# Patient Record
Sex: MALE | Race: WHITE | NOT HISPANIC OR LATINO | Employment: OTHER | URBAN - METROPOLITAN AREA
[De-identification: names, ages, dates, MRNs, and addresses within clinical notes are randomized per-mention and may not be internally consistent; named-entity substitution may affect disease eponyms.]

---

## 2017-01-17 ENCOUNTER — OFFICE VISIT (OUTPATIENT)
Dept: FAMILY MEDICINE | Facility: CLINIC | Age: 82
End: 2017-01-17
Payer: MEDICARE

## 2017-01-17 VITALS
HEART RATE: 59 BPM | SYSTOLIC BLOOD PRESSURE: 122 MMHG | BODY MASS INDEX: 27.38 KG/M2 | WEIGHT: 148.81 LBS | HEIGHT: 62 IN | DIASTOLIC BLOOD PRESSURE: 54 MMHG

## 2017-01-17 DIAGNOSIS — F03.90 DEMENTIA WITHOUT BEHAVIORAL DISTURBANCE, UNSPECIFIED DEMENTIA TYPE: Chronic | ICD-10-CM

## 2017-01-17 DIAGNOSIS — E78.5 HYPERLIPIDEMIA, UNSPECIFIED HYPERLIPIDEMIA TYPE: Chronic | ICD-10-CM

## 2017-01-17 DIAGNOSIS — R32 URINARY INCONTINENCE, UNSPECIFIED TYPE: ICD-10-CM

## 2017-01-17 DIAGNOSIS — H52.203 MYOPIA WITH ASTIGMATISM AND PRESBYOPIA, BILATERAL: ICD-10-CM

## 2017-01-17 DIAGNOSIS — I10 ESSENTIAL HYPERTENSION: Chronic | ICD-10-CM

## 2017-01-17 DIAGNOSIS — H52.13 MYOPIA WITH ASTIGMATISM AND PRESBYOPIA, BILATERAL: ICD-10-CM

## 2017-01-17 DIAGNOSIS — H52.4 MYOPIA WITH ASTIGMATISM AND PRESBYOPIA, BILATERAL: ICD-10-CM

## 2017-01-17 DIAGNOSIS — Z00.00 ENCOUNTER FOR PREVENTIVE HEALTH EXAMINATION: Primary | ICD-10-CM

## 2017-01-17 DIAGNOSIS — I10 DIABETES MELLITUS WITH COINCIDENT HYPERTENSION: Chronic | ICD-10-CM

## 2017-01-17 DIAGNOSIS — Z86.010 HISTORY OF COLON POLYPS: ICD-10-CM

## 2017-01-17 DIAGNOSIS — E11.9 DIABETES MELLITUS WITH COINCIDENT HYPERTENSION: Chronic | ICD-10-CM

## 2017-01-17 DIAGNOSIS — Z96.1 PSEUDOPHAKIA: ICD-10-CM

## 2017-01-17 DIAGNOSIS — Z85.46 HISTORY OF PROSTATE CANCER: Chronic | ICD-10-CM

## 2017-01-17 PROCEDURE — 3074F SYST BP LT 130 MM HG: CPT | Mod: S$GLB,,, | Performed by: NURSE PRACTITIONER

## 2017-01-17 PROCEDURE — G0439 PPPS, SUBSEQ VISIT: HCPCS | Mod: S$GLB,,, | Performed by: NURSE PRACTITIONER

## 2017-01-17 PROCEDURE — 99999 PR PBB SHADOW E&M-EST. PATIENT-LVL IV: CPT | Mod: PBBFAC,,, | Performed by: NURSE PRACTITIONER

## 2017-01-17 PROCEDURE — 99499 UNLISTED E&M SERVICE: CPT | Mod: S$GLB,,, | Performed by: NURSE PRACTITIONER

## 2017-01-17 PROCEDURE — 3078F DIAST BP <80 MM HG: CPT | Mod: S$GLB,,, | Performed by: NURSE PRACTITIONER

## 2017-01-17 NOTE — PROGRESS NOTES
"Darnell Knott presented for a  Medicare AWV and comprehensive Health Risk Assessment today. The following components were reviewed and updated:    · Medical history  · Family History  · Social history  · Allergies and Current Medications  · Health Risk Assessment  · Health Maintenance  · Care Team     ** See Completed Assessments for Annual Wellness Visit within the encounter summary.**  Patient offers no complaints. He is accompanied by his wife. He is regularly seen by his primary care physician, Dr. Caldera. He also has regular optometry exams with Dr. Roper, and, sees his dentist regularly.  He lives at home with his wife who is his primary caregiver. He has dementia by is able to perform all ADL's. He is active around the house and claims to eat a healthy diet.  He declines a tetanus vaccine today.     The following assessments were completed:  · Living Situation  · CAGE  · Depression Screening  · Timed Get Up and Go  · Whisper Test  · Cognitive Function Screening  · Nutrition Screening  · ADL Screening  · PAQ Screening    Vitals:    01/17/17 1005 01/17/17 1020   BP: (!) 149/71 (!) 122/54   BP Location: Left arm Left arm   Patient Position: Sitting Sitting   BP Method: Automatic Manual   Pulse: (!) 59    Weight: 67.5 kg (148 lb 13 oz)    Height: 5' 2" (1.575 m)      Body mass index is 27.22 kg/(m^2).  Physical Exam   Constitutional: He is oriented to person, place, and time. He appears well-developed and well-nourished. No distress.   HENT:   Head: Normocephalic and atraumatic.   Right Ear: External ear normal.   Left Ear: External ear normal.   Nose: Nose normal.   Mouth/Throat: Oropharynx is clear and moist. No oropharyngeal exudate.   Eyes: Conjunctivae and EOM are normal. Pupils are equal, round, and reactive to light. No scleral icterus.   Neck: Normal range of motion. Neck supple. No thyromegaly present.   Cardiovascular: Normal rate, regular rhythm, normal heart sounds and intact distal pulses.  "   Pulmonary/Chest: Effort normal and breath sounds normal. No respiratory distress. He has no wheezes. He exhibits no tenderness.   Abdominal: Soft. Bowel sounds are normal. He exhibits no distension and no mass. There is no tenderness. There is no guarding.   Musculoskeletal: Normal range of motion. He exhibits no edema or deformity.   Lymphadenopathy:     He has no cervical adenopathy.   Neurological: He is alert and oriented to person, place, and time.   Skin: Skin is warm and dry. He is not diaphoretic. No erythema.   Psychiatric: He has a normal mood and affect. His behavior is normal. Judgment and thought content normal.   Nursing note and vitals reviewed.        Diagnoses and health risks identified today and associated recommendations/orders:    1. Encounter for preventive health examination    2. Urinary complication  Stable and controlled. Continue current treatment plan as previously prescribed with your PCP.     3. Urinary incontinence, unspecified type  Stable and controlled. Continue current treatment plan as previously prescribed with your PCP.     4. History of prostate cancer  Stable and controlled. Continue current treatment plan as previously prescribed with your PCP.     5. History of colon polyps  Stable and controlled. Continue current treatment plan as previously prescribed with your PCP.     6. Diabetes mellitus with coincident hypertension  This problem is currently not controlled. Please follow up with your PCP as planned to discuss adjustments to your treatment plan.    7. Essential hypertension  Stable and controlled. Continue current treatment plan as previously prescribed with your PCP.     8. Hyperlipidemia, unspecified hyperlipidemia type  Stable and controlled. Continue current treatment plan as previously prescribed with your PCP.     9. Dementia without behavioral disturbance, unspecified dementia type  Stable and controlled. Continue current treatment plan as previously prescribed  with your PCP.     10. Myopia with astigmatism and presbyopia, bilateral  Stable and controlled. Continue current treatment plan as previously prescribed with Dr. Roper.     11. Pseudophakia  Stable and controlled. Continue current treatment plan as previously prescribed with Dr. Roper.       Provided Relious with a 5-10 year written screening schedule and personal prevention plan. Recommendations were developed using the USPSTF age appropriate recommendations. Education, counseling, and referrals were provided as needed. After Visit Summary printed and given to patient which includes a list of additional screenings\tests needed.    Return for Regular visit with Dr. Caldera.    LOYD Nicholson

## 2017-01-17 NOTE — PATIENT INSTRUCTIONS
Counseling and Referral of Other Preventative  (Italic type indicates deductible and co-insurance are waived)    Patient Name: Darnell Knott  Today's Date: 1/17/2017      SERVICE LIMITATIONS RECOMMENDATION    Vaccines    · Pneumococcal (once after 65)    · Influenza (annually)    · Hepatitis B (if medium/high risk)    · Prevnar 13      Hepatitis B medium/high risk factors:       - End-stage renal disease       - Hemophiliacs who received Factor VII or         IX concentrates       - Clients of institutions for the mentally             retarded       - Persons who live in the same house as          a HepB carrier       - Homosexual men       - Illicit injectable drug abusers     Pneumococcal: Done     Influenza:Done   Hepatitis B:N/A     Prevnar 13:Done    Prostate cancer screening (annually to age 75)     Prostate specific antigen (PSA) Shared decision making with Provider. Sometimes a co-pay may be required if the patient decides to have this test. The USPSTF no longer recommends prostate cancer screening routinely in medicine    Colorectal cancer screening (to age 75)    · Fecal occult blood test (annual)  · Flexible sigmoidoscopy (5y)  · Screening colonoscopy (10y)  · Barium enema  N/A    Diabetes self-management training (no USPSTF recommendations)  Requires referral by treating physician for patient with diabetes or renal disease. 10 hours of initial DSMT sessions of no less than 30 minutes each in a continuous 12-month period. 2 hours of follow-up DSMT in subsequent years. Done    Glaucoma screening (no USPSTF recommendation)  Diabetes mellitus, family history   , age 50 or over    American, age 65 or over Done    Medical nutrition therapy for diabetes or renal disease (no recommended schedule)  Requires referral by treating physician for patient with diabetes or renal disease or kidney transplant within the past 3 years.  Can be provided in same year as diabetes self-management  training (DSMT), and CMS recommends medical nutrition therapy take place after DSMT. Up to 3 hours for initial year and 2 hours in subsequent years. Done    Cardiovascular screening blood tests (every 5 years)  · Fasting lipid panel  Order as a panel if possible Done    Diabetes screening tests (at least every 3 years, Medicare covers annually or at 6-month intervals for prediabetic patients)  · Fasting blood sugar (FBS) or glucose tolerance test (GTT)  Patient must be diagnosed with one of the following:       - Hypertension       - Dyslipidemia       - Obesity (BMI 30kg/m2)       - Previous elevated impaired FBS or GTT       ... or any two of the following:       - Overweight (BMI 25 but <30)       - Family history of diabetes       - Age 65 or older       - History of gestational diabetes or birth of baby weighing more than 9 pounds Done    Abdominal aortic aneurysm screening (once)  · Sonogram   Limited to patients who meet one of the following criteria:       - Men who are 65-75 years old and have smoked more than 100 cigarette in their lifetime       - Anyone with a family history of abdominal aortic aneurysm       - Anyone recommended for screening by the USPSTF N/A    HIV screening (annually for increased risk patients)  · HIV-1 and HIV-2 by EIA, or PRADIP, rapid antibody test or oral mucosa transudate  Patients must be at increased risk for HIV infection per USPSTF guidelines or pregnant. Tests covered annually for patient at increased risk or as requested by the patient. Pregnant patients may receive up to 3 tests during pregnancy.  Risks discussed, screening is not recommended    Smoking cessation counseling (up to 8 sessions per year)  Patients must be asymptomatic of tobacco-related conditions to receive as a preventative service. N/A    Subsequent annual wellness visit  At least 12 months since last AWV  Return in one year     The following information is provided to all patients.  This information is  to help you find resources for any of the problems found today that may be affecting your health:                Living healthy guide: www.[facility].[place].[facility]      Understanding Diabetes: www.diabetes.org      Eating healthy: www.cdc.gov/healthyweight      CDC home safety checklist: www.cdc.gov/steadi/patient.html      Agency on Aging: www.goea.[place].[facility]      Alcoholics anonymous (AA): www.aa.org      Physical Activity: www.aj.nih.gov/no9axmb      Tobacco use: www.quitwithusla.org

## 2017-01-26 DIAGNOSIS — E11.9 TYPE 2 DIABETES MELLITUS WITHOUT COMPLICATION, WITHOUT LONG-TERM CURRENT USE OF INSULIN: ICD-10-CM

## 2017-01-26 RX ORDER — METFORMIN HYDROCHLORIDE 500 MG/1
500 TABLET ORAL 2 TIMES DAILY WITH MEALS
Qty: 90 TABLET | Refills: 1 | Status: SHIPPED | OUTPATIENT
Start: 2017-01-26 | End: 2017-03-27 | Stop reason: SDUPTHER

## 2017-01-26 NOTE — TELEPHONE ENCOUNTER
----- Message from Brandie Vasquez sent at 1/26/2017 10:09 AM CST -----  Contact: wife, nicole   metformin (GLUCOPHAGE) 500 MG tablet  .  [facility] - [place], [place] - [address]  [address]  [facility] [address]  Phone: [phone] Fax: [phone]      Call back  [phone]

## 2017-01-30 NOTE — TELEPHONE ENCOUNTER
i spoke with his wife   He is taking metformin 500mg BID and when i mentioned increasing his medication. She is afraid if we increase it she will have more GI problems. I mentioned 1000 mg BID. She requested that i mention this to you. You might want to change the medication before you increase it. hgb a1c was 9.1 last time,

## 2017-01-31 NOTE — TELEPHONE ENCOUNTER
I spoke with Mrs. Knott.  She is reluctant to change medications.  While the metformin may be contributing to diarrhea, she would like to try a higher dose before he move onto other meds.  Therefore she'll use 1000 in the morning and 500 in the evening of metformin.  She will also add a probiotic.    Please check with her in 2-3 weeks to see what's happening to his morning sugars and to see if there's been any change in his diarrheal pattern.    MALATHI

## 2017-02-14 ENCOUNTER — TELEPHONE (OUTPATIENT)
Dept: FAMILY MEDICINE | Facility: CLINIC | Age: 82
End: 2017-02-14

## 2017-02-14 RX ORDER — FLUOCINONIDE 0.5 MG/G
OINTMENT TOPICAL 2 TIMES DAILY
Qty: 60 G | Refills: 1 | Status: SHIPPED | OUTPATIENT
Start: 2017-02-14 | End: 2019-05-21

## 2017-02-14 NOTE — TELEPHONE ENCOUNTER
This am blood sugar is 115, yesterday 105. Holding pretty good.   He is taking the probiotic. He had a flare up of diarrhea yesterday but not as bad since he is taking probiotic.     Pt also needs refill of lidex cream. For his foot. For the itching rash. [facility] to [facility].

## 2017-02-14 NOTE — TELEPHONE ENCOUNTER
----- Message from RT Dora sent at 2/14/2017 11:22 AM CST -----  Contact: Love (Wife) [phone]   Called Love chirinos (Wife) [phone], requesting a back since the line was some how disconnected earlier, thanks.

## 2017-03-27 ENCOUNTER — OFFICE VISIT (OUTPATIENT)
Dept: FAMILY MEDICINE | Facility: CLINIC | Age: 82
End: 2017-03-27
Payer: MEDICARE

## 2017-03-27 ENCOUNTER — LAB VISIT (OUTPATIENT)
Dept: LAB | Facility: HOSPITAL | Age: 82
End: 2017-03-27
Attending: EMERGENCY MEDICINE
Payer: MEDICARE

## 2017-03-27 VITALS
WEIGHT: 143.31 LBS | BODY MASS INDEX: 26.37 KG/M2 | OXYGEN SATURATION: 95 % | RESPIRATION RATE: 17 BRPM | HEART RATE: 56 BPM | SYSTOLIC BLOOD PRESSURE: 130 MMHG | DIASTOLIC BLOOD PRESSURE: 60 MMHG | HEIGHT: 62 IN

## 2017-03-27 DIAGNOSIS — E11.9 DIABETES MELLITUS WITH COINCIDENT HYPERTENSION: Chronic | ICD-10-CM

## 2017-03-27 DIAGNOSIS — I10 ESSENTIAL HYPERTENSION: Chronic | ICD-10-CM

## 2017-03-27 DIAGNOSIS — F03.90 DEMENTIA WITHOUT BEHAVIORAL DISTURBANCE, UNSPECIFIED DEMENTIA TYPE: Primary | Chronic | ICD-10-CM

## 2017-03-27 DIAGNOSIS — E11.9 TYPE 2 DIABETES MELLITUS WITHOUT COMPLICATION, WITHOUT LONG-TERM CURRENT USE OF INSULIN: ICD-10-CM

## 2017-03-27 DIAGNOSIS — R63.4 WEIGHT LOSS: ICD-10-CM

## 2017-03-27 DIAGNOSIS — Z85.46 HISTORY OF PROSTATE CANCER: Chronic | ICD-10-CM

## 2017-03-27 DIAGNOSIS — I10 DIABETES MELLITUS WITH COINCIDENT HYPERTENSION: Chronic | ICD-10-CM

## 2017-03-27 LAB
BASOPHILS # BLD AUTO: 0.03 K/UL
BASOPHILS NFR BLD: 0.2 %
DIFFERENTIAL METHOD: ABNORMAL
EOSINOPHIL # BLD AUTO: 0.1 K/UL
EOSINOPHIL NFR BLD: 0.7 %
ERYTHROCYTE [DISTWIDTH] IN BLOOD BY AUTOMATED COUNT: 14.1 %
HCT VFR BLD AUTO: 41 %
HGB BLD-MCNC: 13.4 G/DL
LYMPHOCYTES # BLD AUTO: 2 K/UL
LYMPHOCYTES NFR BLD: 16.2 %
MCH RBC QN AUTO: 26.1 PG
MCHC RBC AUTO-ENTMCNC: 32.7 %
MCV RBC AUTO: 80 FL
MONOCYTES # BLD AUTO: 0.8 K/UL
MONOCYTES NFR BLD: 6.5 %
NEUTROPHILS # BLD AUTO: 9.2 K/UL
NEUTROPHILS NFR BLD: 76.2 %
PLATELET # BLD AUTO: 357 K/UL
PMV BLD AUTO: 10.3 FL
RBC # BLD AUTO: 5.14 M/UL
WBC # BLD AUTO: 12.06 K/UL

## 2017-03-27 PROCEDURE — 3078F DIAST BP <80 MM HG: CPT | Mod: S$GLB,,, | Performed by: EMERGENCY MEDICINE

## 2017-03-27 PROCEDURE — 1126F AMNT PAIN NOTED NONE PRSNT: CPT | Mod: S$GLB,,, | Performed by: EMERGENCY MEDICINE

## 2017-03-27 PROCEDURE — 83036 HEMOGLOBIN GLYCOSYLATED A1C: CPT

## 2017-03-27 PROCEDURE — 1159F MED LIST DOCD IN RCRD: CPT | Mod: S$GLB,,, | Performed by: EMERGENCY MEDICINE

## 2017-03-27 PROCEDURE — 99214 OFFICE O/P EST MOD 30 MIN: CPT | Mod: S$GLB,,, | Performed by: EMERGENCY MEDICINE

## 2017-03-27 PROCEDURE — 99999 PR PBB SHADOW E&M-EST. PATIENT-LVL IV: CPT | Mod: PBBFAC,,, | Performed by: EMERGENCY MEDICINE

## 2017-03-27 PROCEDURE — 3075F SYST BP GE 130 - 139MM HG: CPT | Mod: S$GLB,,, | Performed by: EMERGENCY MEDICINE

## 2017-03-27 PROCEDURE — 1160F RVW MEDS BY RX/DR IN RCRD: CPT | Mod: S$GLB,,, | Performed by: EMERGENCY MEDICINE

## 2017-03-27 PROCEDURE — 1157F ADVNC CARE PLAN IN RCRD: CPT | Mod: S$GLB,,, | Performed by: EMERGENCY MEDICINE

## 2017-03-27 PROCEDURE — 85025 COMPLETE CBC W/AUTO DIFF WBC: CPT

## 2017-03-27 PROCEDURE — 36415 COLL VENOUS BLD VENIPUNCTURE: CPT | Mod: PO

## 2017-03-27 RX ORDER — METFORMIN HYDROCHLORIDE 500 MG/1
500 TABLET ORAL 2 TIMES DAILY WITH MEALS
Qty: 180 TABLET | Refills: 3 | Status: SHIPPED | OUTPATIENT
Start: 2017-03-27 | End: 2017-04-19 | Stop reason: SINTOL

## 2017-03-27 RX ORDER — PENICILLIN V POTASSIUM 500 MG/1
500 TABLET, FILM COATED ORAL 3 TIMES DAILY
COMMUNITY
Start: 2017-03-23 | End: 2018-04-05

## 2017-03-27 RX ORDER — METFORMIN HYDROCHLORIDE 500 MG/1
500 TABLET ORAL 2 TIMES DAILY WITH MEALS
Qty: 180 TABLET | Refills: 1 | Status: SHIPPED | OUTPATIENT
Start: 2017-03-27 | End: 2017-03-27 | Stop reason: SDUPTHER

## 2017-03-27 NOTE — MR AVS SNAPSHOT
[facility]  [address] [facility] [address]  [place] [address]  Phone: [phone]  Fax: [phone]                  Darnell Knott   3/27/2017 10:40 AM   Office Visit    Description:  Male : 1932   Provider:  Nixon Caldera Jr., MD   Department:  [facility]           Reason for Visit     type 2 diabetes mellitus without complications           Diagnoses this Visit        Comments    Dementia without behavioral disturbance, unspecified dementia type    -  Primary     Diabetes mellitus with coincident hypertension         Essential hypertension         History of prostate cancer         Weight loss         Type 2 diabetes mellitus without complication, without long-term current use of insulin                To Do List           Future Appointments        Provider Department Dept Phone    3/27/2017 12:30 PM LAB, [place] [facility] [phone]    2017 8:05 AM LAB, [place] [facility] [phone]      Goals (5 Years of Data)              16    HEMOGLOBIN A1C < 7.0   9.1  8.1      LDL CHOLESTEROL < 100       92.2       These Medications        Disp Refills Start End    metformin (GLUCOPHAGE) 500 MG tablet 180 tablet 3 3/27/2017 3/27/2018    Take 1 tablet (500 mg total) by mouth 2 (two) times daily with meals. - Oral    Pharmacy: [facility] - [place] [id number] [facility] Ph #: [phone]         [facility] On Call     [facility] Nurse Care Line -  Assistance  Registered nurses in the [facility] provide clinical advisement, health education, appointment booking, and other advisory services.  Call for this free service at [phone].             Medications           Message regarding Medications     Verify the changes and/or additions to your medication regime listed below are the same as discussed with your clinician today.  If any of these  "changes or additions are incorrect, please notify your healthcare provider.             Verify that the below list of medications is an accurate representation of the medications you are currently taking.  If none reported, the list may be blank. If incorrect, please contact your healthcare provider. Carry this list with you in case of emergency.           Current Medications     acetaminophen (TYLENOL) 325 MG tablet Take 325 mg by mouth every 6 (six) hours as needed for Pain.    amlodipine (NORVASC) 10 MG tablet Take 1 tablet (10 mg total) by mouth once daily.    aspirin 81 mg Tab 1 Tablet(s) Oral PRN Every day.      atorvastatin (LIPITOR) 40 MG tablet Take 1 tablet (40 mg total) by mouth once daily.    donepezil (ARICEPT) 5 MG tablet Take 1 tablet (5 mg total) by mouth every evening.    fluocinonide (LIDEX) 0.05 % ointment Apply topically 2 (two) times daily.    fluticasone (FLONASE) 50 mcg/actuation nasal spray 2 sprays by Each Nare route once daily.    lisinopril (PRINIVIL,ZESTRIL) 5 MG tablet Take 1 tablet (5 mg total) by mouth once daily.    loperamide (IMODIUM) 2 mg capsule Take 2 mg by mouth 4 (four) times daily as needed for Diarrhea.    metformin (GLUCOPHAGE) 500 MG tablet Take 1 tablet (500 mg total) by mouth 2 (two) times daily with meals.    MV, MIN #36/IRON,CARBONYL/FA (GERITOL COMPLETE ORAL) Take by mouth.    omeprazole (PRILOSEC) 20 MG capsule Take 1 capsule (20 mg total) by mouth once daily.    penicillin v potassium (VEETID) 500 MG tablet Take 500 tablets by mouth 3 (three) times daily.           Clinical Reference Information           Your Vitals Were     BP Pulse Resp Height Weight SpO2    130/60 (BP Location: Right arm, Patient Position: Sitting, BP Method: Manual) 56 17 5' 2" (1.575 m) 65 kg (143 lb 4.8 oz) 95%    BMI                26.21 kg/m2          Blood Pressure          Most Recent Value    BP  130/60      Allergies as of 3/27/2017     Morphine      Immunizations Administered on Date of " Encounter - 3/27/2017     None      Orders Placed During Today's Visit     Future Labs/Procedures Expected by Expires    CBC auto differential  3/27/2017 5/26/2018    Hemoglobin A1c  3/27/2017 5/26/2018      [facility] Sign-Up     Activating your [facility] account is as easy as 1-2-3!     1) Visit [email or web address], select Sign Up Now, enter this activation code and your date of birth, then select Next.  Activation code not generated  Current Patient Portal Status: Account disabled      2) Create a username and password to use when you visit [facility] in the future and select a security question in case you lose your password and select Next.    3) Enter your e-mail address and click Sign Up!    Additional Information  If you have questions, please e-mail [email or web address] or call [phone] to talk to our [facility] staff. Remember, [facility] is NOT to be used for urgent needs. For medical emergencies, dial 911.         Instructions    Neris,    I am concerned about Relious' weight loss.  I am doing a series of blood tests today to check your diabetes, kidneys, liver, blood count.    I would not increase your metformin beyond the 500 mg twice a day.    We will call you with these results.    [facility]       Language Assistance Services     ATTENTION: Language assistance services are available, free of charge. Please call [phone].      ATENCIÓN: Si habla español, tiene a chao disposición servicios gratuitos de asistencia lingüística. Llame al [phone].     CHÚ Ý: N?u b?n nói Ti?ng Vi?t, có các d?ch v? h? tr? ngôn ng? mi?n phí dành cho b?n. G?i s? [phone].         [facility] complies with applicable Federal civil rights laws and does not discriminate on the basis of race, color, national origin, age, disability, or sex.

## 2017-03-27 NOTE — PROGRESS NOTES
Subjective:   THIS NOTE IS DONE WITH VOICE RECOGNITION        Patient ID: Darnell Knott is a 84 y.o. male.    Chief Complaint: type 2 diabetes mellitus without complications (follow up )      HPI     His diabetes is stable.  He has been eating less, voluntarily.  He's lost 5 pounds.  His wife notes that he has significant diaphoresis while eating.  She has not noticed this in the other time.  His blood sugars have not been excessively low.    His diarrhea is better with the lower dose metformin.  He's currently using 500 mg twice daily.  It's is a reduction from 1500 mg per day.  Impact on hemoglobin A1c is unknown at this time.    Lab Results   Component Value Date    HGBA1C 9.1 (H) 11/22/2016       His dementia continues to gradually advance.  He's becoming less active.  He continues to drive with supervision, without problems.  He does have issues not knowing where to go, with actual mechanics of operating a car.  Be safe.    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  He doesn't appear to be particularly interested.    No new issues with his history of prostate cancer.      Current Outpatient Prescriptions   Medication Sig Dispense Refill    acetaminophen (TYLENOL) 325 MG tablet Take 325 mg by mouth every 6 (six) hours as needed for Pain.      amlodipine (NORVASC) 10 MG tablet Take 1 tablet (10 mg total) by mouth once daily. 90 tablet 3    aspirin 81 mg Tab 1 Tablet(s) Oral PRN Every day.        atorvastatin (LIPITOR) 40 MG tablet Take 1 tablet (40 mg total) by mouth once daily. 90 tablet 3    donepezil (ARICEPT) 5 MG tablet Take 1 tablet (5 mg total) by mouth every evening. 90 tablet 3    fluocinonide (LIDEX) 0.05 % ointment Apply topically 2 (two) times daily. 60 g 1    fluticasone (FLONASE) 50 mcg/actuation nasal spray 2 sprays by Each Nare route once daily. 16 g 11    lisinopril (PRINIVIL,ZESTRIL) 5 MG tablet Take 1 tablet (5 mg total) by mouth once daily. 90 tablet 3     loperamide (IMODIUM) 2 mg capsule Take 2 mg by mouth 4 (four) times daily as needed for Diarrhea.      metformin (GLUCOPHAGE) 500 MG tablet Take 1 tablet (500 mg total) by mouth 2 (two) times daily with meals. 90 tablet 1    MV, MIN #36/IRON,CARBONYL/FA (GERITOL COMPLETE ORAL) Take by mouth.      omeprazole (PRILOSEC) 20 MG capsule Take 1 capsule (20 mg total) by mouth once daily. 90 capsule 3    penicillin v potassium (VEETID) 500 MG tablet Take 500 tablets by mouth 3 (three) times daily.       No current facility-administered medications for this visit.          Review of Systems   Constitutional: Positive for unexpected weight change.   HENT: Negative.    Respiratory: Negative for cough and shortness of breath.    Cardiovascular: Negative for chest pain and leg swelling.   Gastrointestinal: Positive for diarrhea (improved with reduction of metformin dose.).   Genitourinary:        Some urinary incontinence.  No hematuria.  No frequency.   Neurological:        Memory issues continue.  No new focal signs.   Hematological: Does not bruise/bleed easily.   Psychiatric/Behavioral: Negative for agitation.       Objective:      Physical Exam   Constitutional: He appears well-nourished.   HENT:   Head: Normocephalic and atraumatic.   Mouth/Throat: Oropharynx is clear and moist.   Eyes: Conjunctivae are normal. Pupils are equal, round, and reactive to light.   Neck: No JVD present.   Cardiovascular: Normal rate, regular rhythm and normal heart sounds.    Pulmonary/Chest: Breath sounds normal.   Abdominal: Bowel sounds are normal. There is no tenderness.   Musculoskeletal: Normal range of motion. He exhibits no deformity.   Neurological: He is alert.   He is oriented to person, less so to place and time.  His wife does most of the talking in regards to symptoms and concerns.  He will answer straightforward questions with a yes or no.   Skin: No rash noted.   Vitals reviewed.      Assessment:       1. Dementia without  behavioral disturbance, unspecified dementia type    2. Diabetes mellitus with coincident hypertension    3. Essential hypertension    4. History of prostate cancer    5. Weight loss        Plan:       1. Dementia without behavioral disturbance, unspecified dementia type  Gradually increasing signs of dementia      2. Diabetes mellitus with coincident hypertension  Likely not controlled  Balance between complexity of treatment regimen, cost, age and comorbidities    - Hemoglobin A1c; Future    3. Essential hypertension  Controlled  No change in medications    4. History of prostate cancer  Thought to be stable  Update diagnostic PSA as it's been greater than 1 year    5. Weight loss  Check basic labs  When asked specifically about what Mrs. Kntot would do if there was a major problem, she is not inclined to move forward with any type of significant intervention if there is a major problem.    - CBC auto differential; Future    6. Type 2 diabetes mellitus without complication, without long-term current use of insulin  Not controlled as noted above  We'll decrease metformin to 1000 mg a day.  We may need to add a second medication.      - metformin (GLUCOPHAGE) 500 MG tablet; Take 1 tablet (500 mg total) by mouth 2 (two) times daily with meals.  Dispense: 180 tablet; Refill: 1

## 2017-03-27 NOTE — PATIENT INSTRUCTIONS
Neris,    I am concerned about Relious' weight loss.  I am doing a series of blood tests today to check your diabetes, kidneys, liver, blood count.    I would not increase your metformin beyond the 500 mg twice a day.    We will call you with these results.    MALATHI

## 2017-03-28 LAB
ESTIMATED AVG GLUCOSE: 151 MG/DL
HBA1C MFR BLD HPLC: 6.9 %

## 2017-04-18 ENCOUNTER — LAB VISIT (OUTPATIENT)
Dept: LAB | Facility: HOSPITAL | Age: 82
End: 2017-04-18
Attending: EMERGENCY MEDICINE
Payer: MEDICARE

## 2017-04-18 DIAGNOSIS — E11.9 TYPE 2 DIABETES MELLITUS WITHOUT COMPLICATION, WITHOUT LONG-TERM CURRENT USE OF INSULIN: Chronic | ICD-10-CM

## 2017-04-18 DIAGNOSIS — Z85.46 HISTORY OF PROSTATE CANCER: Chronic | ICD-10-CM

## 2017-04-18 DIAGNOSIS — I10 DIABETES MELLITUS WITH COINCIDENT HYPERTENSION: Chronic | ICD-10-CM

## 2017-04-18 DIAGNOSIS — E11.9 DIABETES MELLITUS WITH COINCIDENT HYPERTENSION: Chronic | ICD-10-CM

## 2017-04-18 LAB
ALBUMIN SERPL BCP-MCNC: 4.3 G/DL
ALP SERPL-CCNC: 66 U/L
ALT SERPL W/O P-5'-P-CCNC: 55 U/L
ANION GAP SERPL CALC-SCNC: 9 MMOL/L
AST SERPL-CCNC: 45 U/L
BILIRUB SERPL-MCNC: 0.8 MG/DL
BUN SERPL-MCNC: 14 MG/DL
CALCIUM SERPL-MCNC: 9.7 MG/DL
CHLORIDE SERPL-SCNC: 101 MMOL/L
CHOLEST/HDLC SERPL: 4.3 {RATIO}
CO2 SERPL-SCNC: 26 MMOL/L
COMPLEXED PSA SERPL-MCNC: <0.01 NG/ML
CREAT SERPL-MCNC: 0.7 MG/DL
EST. GFR  (AFRICAN AMERICAN): >60 ML/MIN/1.73 M^2
EST. GFR  (NON AFRICAN AMERICAN): >60 ML/MIN/1.73 M^2
GLUCOSE SERPL-MCNC: 119 MG/DL
HDL/CHOLESTEROL RATIO: 23.5 %
HDLC SERPL-MCNC: 183 MG/DL
HDLC SERPL-MCNC: 43 MG/DL
LDLC SERPL CALC-MCNC: 108 MG/DL
NONHDLC SERPL-MCNC: 140 MG/DL
POTASSIUM SERPL-SCNC: 4.2 MMOL/L
PROT SERPL-MCNC: 7.6 G/DL
SODIUM SERPL-SCNC: 136 MMOL/L
TRIGL SERPL-MCNC: 160 MG/DL

## 2017-04-18 PROCEDURE — 80061 LIPID PANEL: CPT

## 2017-04-18 PROCEDURE — 84153 ASSAY OF PSA TOTAL: CPT

## 2017-04-18 PROCEDURE — 36415 COLL VENOUS BLD VENIPUNCTURE: CPT | Mod: PO

## 2017-04-18 PROCEDURE — 80053 COMPREHEN METABOLIC PANEL: CPT

## 2017-04-19 ENCOUNTER — TELEPHONE (OUTPATIENT)
Dept: FAMILY MEDICINE | Facility: CLINIC | Age: 82
End: 2017-04-19

## 2017-04-19 DIAGNOSIS — E11.9 TYPE 2 DIABETES MELLITUS WITHOUT COMPLICATION, WITHOUT LONG-TERM CURRENT USE OF INSULIN: Primary | ICD-10-CM

## 2017-04-19 RX ORDER — GLIMEPIRIDE 2 MG/1
2 TABLET ORAL
Qty: 90 TABLET | Refills: 1 | Status: SHIPPED | OUTPATIENT
Start: 2017-04-19 | End: 2017-08-17 | Stop reason: SDUPTHER

## 2017-04-19 NOTE — TELEPHONE ENCOUNTER
Reviewed with his wife, and remind her to stop the metformin as we discussed during her visit.    We will replace the metformin with glimepiride.  A new prescription has been sent to her pharmacy.    We will update his hemoglobin A1c in 90 days.  Order in.    MALATHI

## 2017-04-19 NOTE — PROGRESS NOTES
All his diabetes is reasonably controlled, he's had significant issues with diarrhea.  This was better after we decreased the metformin, but has not totally resolved.  It's had a point that I do feel that stopping it entirely and using alternative medications would be reasonable.  We will start with low-dose glimepiride.    This was discussed with his wife during her visit on 18 April.    MALATHI

## 2017-07-19 ENCOUNTER — TELEPHONE (OUTPATIENT)
Dept: FAMILY MEDICINE | Facility: CLINIC | Age: 82
End: 2017-07-19

## 2017-07-19 NOTE — TELEPHONE ENCOUNTER
----- Message from Ingrid Baltazar sent at 7/19/2017 10:14 AM CDT -----  Contact: Wife, Love  Patient cannot control his kidney and the urine is awful strong smelling. Please call Love at [phone]. Love wants to know if Dr. Caldera thinks patient needs to see Dr. Ordoñez.

## 2017-07-19 NOTE — TELEPHONE ENCOUNTER
Pt has a hx of prostate cancer. He is having incontinence of urine this has been going on for a while. He will get an appointment with Dr. delgado he has been following him to see if there is something they can give him for this. The wife is fine with this recommendation.

## 2017-08-14 ENCOUNTER — TELEPHONE (OUTPATIENT)
Dept: FAMILY MEDICINE | Facility: CLINIC | Age: 82
End: 2017-08-14

## 2017-08-14 NOTE — TELEPHONE ENCOUNTER
Spoke with wife, she had to take Mr Knott to the emergency room, due to blood in urine. X 2 that am. It was fresh blood, went to er at [facility].   They checked for him Kidney stones. The blood cleared up while pt was at the emergency room. Diagnosed with acute cystitis. He was given sulfamthoxacole/TMP DS two times a day.   Wife is very concerned because the medication bottle states that it could cause diarrhea for up to a month. She said he just got over the diarrhea from the metformin and he is able to go places without fear of diarrhea.   Please let wife know if he needs to take this? I asked her to please keep taking medication until you hear from us.

## 2017-08-14 NOTE — TELEPHONE ENCOUNTER
----- Message from Ros Cline sent at 8/14/2017 10:00 AM CDT -----  Contact: Wife,Love  Placed call to pod, Love want to speak with a nurse regarding giving patient antibiotic from ER over the weekend please call back at [phone]

## 2017-08-15 NOTE — TELEPHONE ENCOUNTER
Records from [facility] reviewed.    CAT scan revealed bladder wall thickening.  No other acute findings on the CT of the abdomen and pelvis.    A urine culture was not available.    He will definitely need follow-up on this hematuria.    It is fine for him to use a probiotic.    I have attempted to call a couple of times.  I finally left a message for Mrs. Knott.  It's probably worthwhile following up with her tomorrow to see if she hurt her voicemail.    MALATHI

## 2017-08-17 ENCOUNTER — OFFICE VISIT (OUTPATIENT)
Dept: FAMILY MEDICINE | Facility: CLINIC | Age: 82
End: 2017-08-17
Payer: MEDICARE

## 2017-08-17 ENCOUNTER — LAB VISIT (OUTPATIENT)
Dept: LAB | Facility: HOSPITAL | Age: 82
End: 2017-08-17
Attending: EMERGENCY MEDICINE
Payer: MEDICARE

## 2017-08-17 VITALS
SYSTOLIC BLOOD PRESSURE: 120 MMHG | WEIGHT: 151.69 LBS | DIASTOLIC BLOOD PRESSURE: 60 MMHG | RESPIRATION RATE: 16 BRPM | OXYGEN SATURATION: 96 % | BODY MASS INDEX: 27.92 KG/M2 | HEART RATE: 54 BPM | HEIGHT: 62 IN

## 2017-08-17 DIAGNOSIS — F03.90 DEMENTIA WITHOUT BEHAVIORAL DISTURBANCE, UNSPECIFIED DEMENTIA TYPE: Primary | Chronic | ICD-10-CM

## 2017-08-17 DIAGNOSIS — E11.9 TYPE 2 DIABETES MELLITUS WITHOUT COMPLICATION, WITHOUT LONG-TERM CURRENT USE OF INSULIN: ICD-10-CM

## 2017-08-17 PROCEDURE — 3074F SYST BP LT 130 MM HG: CPT | Mod: S$GLB,,, | Performed by: EMERGENCY MEDICINE

## 2017-08-17 PROCEDURE — 83036 HEMOGLOBIN GLYCOSYLATED A1C: CPT

## 2017-08-17 PROCEDURE — 3078F DIAST BP <80 MM HG: CPT | Mod: S$GLB,,, | Performed by: EMERGENCY MEDICINE

## 2017-08-17 PROCEDURE — 1159F MED LIST DOCD IN RCRD: CPT | Mod: S$GLB,,, | Performed by: EMERGENCY MEDICINE

## 2017-08-17 PROCEDURE — 1126F AMNT PAIN NOTED NONE PRSNT: CPT | Mod: S$GLB,,, | Performed by: EMERGENCY MEDICINE

## 2017-08-17 PROCEDURE — 3008F BODY MASS INDEX DOCD: CPT | Mod: S$GLB,,, | Performed by: EMERGENCY MEDICINE

## 2017-08-17 PROCEDURE — 99999 PR PBB SHADOW E&M-EST. PATIENT-LVL III: CPT | Mod: PBBFAC,,, | Performed by: EMERGENCY MEDICINE

## 2017-08-17 PROCEDURE — 36415 COLL VENOUS BLD VENIPUNCTURE: CPT | Mod: PO

## 2017-08-17 PROCEDURE — 99499 UNLISTED E&M SERVICE: CPT | Mod: S$GLB,,, | Performed by: EMERGENCY MEDICINE

## 2017-08-17 PROCEDURE — 99213 OFFICE O/P EST LOW 20 MIN: CPT | Mod: S$GLB,,, | Performed by: EMERGENCY MEDICINE

## 2017-08-17 RX ORDER — GLIMEPIRIDE 1 MG/1
1 TABLET ORAL
Qty: 90 TABLET | Refills: 3 | Status: SHIPPED | OUTPATIENT
Start: 2017-08-17 | End: 2017-12-10 | Stop reason: SDUPTHER

## 2017-08-17 RX ORDER — SULFAMETHOXAZOLE AND TRIMETHOPRIM 800; 160 MG/1; MG/1
TABLET ORAL
COMMUNITY
Start: 2017-08-13 | End: 2018-04-05

## 2017-08-17 NOTE — PROGRESS NOTES
Subjective:   THIS NOTE IS DONE WITH VOICE RECOGNITION        Patient ID: Mayra Knott is a 84 y.o. male.    Chief Complaint: type 2 diabetes mellitus without complications      HPI   Mr. Knott is in to review his diabetes care.  He has type 2 diabetes, and currently has excellent hemoglobin A1c scores.  Unfortunately he is experiencing episodes of some sweating and shaking.  His blood sugars have been in the high 90s.  He is not had syncope.  He has no new numbness or tingling.  He has no chest pain of any type.  His activity levels remain stable.    Results for MAYRA KNOTT (MRN [id number]) as of 8/19/2017 09:02   Ref. Range 2/23/2016 10:18 8/23/2016 12:29 11/22/2016 10:51 3/27/2017 12:42 8/17/2017 11:43   Hemoglobin A1C Latest Ref Range: 4.0 - 5.6 % 7.1 (H) 8.1 (H) 9.1 (H) 6.9 (H) 6.8 (H)   Estimated Avg Glucose Latest Ref Range: 68 - 131 mg/dL 157 (H) 186 (H) 214 (H) 151 (H) 148 (H)     His diarrhea has improved substantially, and is essentially nonexistent since stopping his metformin.    His dementia continues to gradually progress.  There are no new issues.  I do sense that his caregiver is beginning to fatigue.    Immunization History   Administered Date(s) Administered    Influenza - High Dose 12/14/2012, 09/24/2013, 10/03/2014, 10/27/2015, 11/22/2016    Influenza A (H1N1) 2009 Monovalent - IM 02/02/2010    Influenza Split 11/19/2009, 10/26/2010, 09/17/2011    Pneumococcal Conjugate 06/24/2004    Pneumococcal Conjugate - 13 Valent 06/24/2004, 02/24/2015    Pneumococcal Polysaccharide - 23 Valent 04/27/2016         No recent hospitalizations, ER visits, or urgent care visits.    Current Outpatient Prescriptions   Medication Sig Dispense Refill    acetaminophen (TYLENOL) 325 MG tablet Take 325 mg by mouth every 6 (six) hours as needed for Pain.      amlodipine (NORVASC) 10 MG tablet Take 1 tablet (10 mg total) by mouth once daily. 90 tablet 3    aspirin 81 mg Tab 1 Tablet(s) Oral PRN Every day.         atorvastatin (LIPITOR) 40 MG tablet Take 1 tablet (40 mg total) by mouth once daily. 90 tablet 3    donepezil (ARICEPT) 5 MG tablet Take 1 tablet (5 mg total) by mouth every evening. 90 tablet 3    fluocinonide (LIDEX) 0.05 % ointment Apply topically 2 (two) times daily. 60 g 1    fluticasone (FLONASE) 50 mcg/actuation nasal spray 2 sprays by Each Nare route once daily. 16 g 11    glimepiride (AMARYL) 2 MG tablet Take 1 tablet (2 mg total) by mouth before breakfast. 90 tablet 1    lisinopril (PRINIVIL,ZESTRIL) 5 MG tablet Take 1 tablet (5 mg total) by mouth once daily. 90 tablet 3    loperamide (IMODIUM) 2 mg capsule Take 2 mg by mouth 4 (four) times daily as needed for Diarrhea.      MV, MIN #36/IRON,CARBONYL/FA (GERITOL COMPLETE ORAL) Take by mouth.      omeprazole (PRILOSEC) 20 MG capsule Take 1 capsule (20 mg total) by mouth once daily. 90 capsule 3    penicillin v potassium (VEETID) 500 MG tablet Take 500 tablets by mouth 3 (three) times daily.      sulfamethoxazole-trimethoprim 800-160mg (BACTRIM DS) 800-160 mg Tab        No current facility-administered medications for this visit.          Review of Systems   Unable to perform ROS: Dementia   Gastrointestinal: Negative for constipation and diarrhea.   Endocrine:        See above comments.       Objective:      Physical Exam   Constitutional: He is oriented to person, place, and time. He appears well-developed and well-nourished. No distress.   HENT:   Head: Normocephalic and atraumatic.   Right Ear: External ear normal.   Left Ear: External ear normal.   Nose: Nose normal.   Mouth/Throat: Oropharynx is clear and moist. No oropharyngeal exudate.   Eyes: Conjunctivae and EOM are normal. Pupils are equal, round, and reactive to light. No scleral icterus.   Neck: Normal range of motion. Neck supple. No thyromegaly present.   Cardiovascular: Normal rate, regular rhythm and normal heart sounds.    Pulses:       Dorsalis pedis pulses are 2+ on the  right side, and 2+ on the left side.        Posterior tibial pulses are 2+ on the right side, and 2+ on the left side.   Pulmonary/Chest: Effort normal and breath sounds normal. He has no wheezes. He has no rales.   Abdominal: Soft. Bowel sounds are normal. He exhibits no distension. There is no tenderness.   Musculoskeletal: Normal range of motion. He exhibits no edema or tenderness.        Right foot: There is no deformity.        Left foot: There is no deformity.   Feet:   Right Foot:   Protective Sensation: 5 sites tested. 5 sites sensed.   Skin Integrity: Negative for skin breakdown.   Left Foot:   Protective Sensation: 5 sites tested. 5 sites sensed.   Skin Integrity: Negative for skin breakdown.   Lymphadenopathy:     He has no cervical adenopathy.   Neurological: He is alert and oriented to person, place, and time. He has normal reflexes. He exhibits normal muscle tone. Coordination normal.   Skin: Skin is warm and dry. Capillary refill takes less than 2 seconds. No rash noted.   Psychiatric:   Oriented to person, less soda place, not time.    Most talking to him by his wife.    Definite short-term cognitive impairment.  Mostly manifested with memory.   Vitals reviewed.      Assessment:       1. Type 2 diabetes mellitus without complication, without long-term current use of insulin        Plan:       1. Type 2 diabetes mellitus without complication, without long-term current use of insulin  Controlled  Perhaps too much  Decreased glimepiride 1 mg a day  Repeat hemoglobin A1c in 3 months.

## 2017-08-17 NOTE — TELEPHONE ENCOUNTER
Spoke with pt wife he is doing okay. He has an appointment at 10 today. No diarrhea he finishes his meds on Sunday.

## 2017-08-18 LAB
ESTIMATED AVG GLUCOSE: 148 MG/DL
HBA1C MFR BLD HPLC: 6.8 %

## 2017-10-02 DIAGNOSIS — I10 ESSENTIAL HYPERTENSION: ICD-10-CM

## 2017-10-02 DIAGNOSIS — E78.5 HYPERLIPIDEMIA, UNSPECIFIED HYPERLIPIDEMIA TYPE: ICD-10-CM

## 2017-10-02 RX ORDER — LISINOPRIL 5 MG/1
5 TABLET ORAL DAILY
Qty: 90 TABLET | Refills: 3 | Status: SHIPPED | OUTPATIENT
Start: 2017-10-02 | End: 2018-07-31 | Stop reason: SDUPTHER

## 2017-10-02 NOTE — TELEPHONE ENCOUNTER
----- Message from Kenan Rosenberg sent at 10/2/2017  9:42 AM CDT -----  Contact: Wife/Love Aleman called in regarding the attached patient () and requested a refill on his Rx for Lisinopril 5 mg.  A new Rx has to be sent over.    [facility] - [place], [place] - [address]  [address] [place] [address] [address]  Phone: [phone] Fax: [phone]    Patient call back number is [phone]

## 2017-10-02 NOTE — TELEPHONE ENCOUNTER
----- Message from Mary Ellen Mcclellan sent at 10/2/2017 10:25 AM CDT -----  Contact: wife Love  Pt wife Love returning Ms Raman phone call..[phone] (home)

## 2017-10-12 ENCOUNTER — TELEPHONE (OUTPATIENT)
Dept: FAMILY MEDICINE | Facility: CLINIC | Age: 82
End: 2017-10-12

## 2017-10-12 NOTE — TELEPHONE ENCOUNTER
I spoke with pt wife. She said he is blood sugar is running up and down. His blood sugar is pretty much okay in am but at night goes up to 180-highest 292. She said when it is high she gives him an extra glipizide. She check his blood sugar twice a day sometime three times a day. In the am it is 134-141,   142. She said she has appointment on Thursday and she will ask you if this is okay to do.   She also needs contour test strips

## 2017-10-12 NOTE — TELEPHONE ENCOUNTER
----- Message from Sophia Wood sent at 10/12/2017  8:48 AM CDT -----  Contact: wife, Marilin Knott  Patient's wife, Marilin Knott called asking for advice about patient's blood sugar has been running high since medication change.  Please call patient's wife at [phone]. Thanks!

## 2017-10-12 NOTE — TELEPHONE ENCOUNTER
----- Message from Sophia Wood sent at 10/12/2017  2:42 PM CDT -----  Contact: wife, Love Knott  Patient's wife, Love Knott is returning call regarding blood sugar.  Please call patient's wife at [phone].  Thanks!

## 2017-10-13 NOTE — TELEPHONE ENCOUNTER
I would not encourage her to add extra glipizide.  We can deal with his blood sugars in a different fashion.  Too much glipizide may result in a significant low blood sugar.  Please ask her not to add glipizide, particularly in light of his good hemoglobin A1c in August.  We can discuss this in detail during the visit.

## 2017-10-13 NOTE — TELEPHONE ENCOUNTER
Spoke with pt wife she understands that she is not to give mr leonardo an extra glipizide. This may bring his blood sugar down too low. She verbalizes understanding and will not do this.

## 2017-12-05 ENCOUNTER — CLINICAL SUPPORT (OUTPATIENT)
Dept: FAMILY MEDICINE | Facility: CLINIC | Age: 82
End: 2017-12-05
Attending: EMERGENCY MEDICINE
Payer: MEDICARE

## 2017-12-05 ENCOUNTER — OFFICE VISIT (OUTPATIENT)
Dept: FAMILY MEDICINE | Facility: CLINIC | Age: 82
End: 2017-12-05
Payer: MEDICARE

## 2017-12-05 ENCOUNTER — LAB VISIT (OUTPATIENT)
Dept: LAB | Facility: HOSPITAL | Age: 82
End: 2017-12-05
Attending: EMERGENCY MEDICINE
Payer: MEDICARE

## 2017-12-05 VITALS
BODY MASS INDEX: 28.76 KG/M2 | HEART RATE: 56 BPM | HEIGHT: 62 IN | WEIGHT: 156.31 LBS | DIASTOLIC BLOOD PRESSURE: 60 MMHG | SYSTOLIC BLOOD PRESSURE: 122 MMHG | OXYGEN SATURATION: 95 % | RESPIRATION RATE: 17 BRPM

## 2017-12-05 DIAGNOSIS — R41.3 MEMORY CHANGES: ICD-10-CM

## 2017-12-05 DIAGNOSIS — I10 DIABETES MELLITUS WITH COINCIDENT HYPERTENSION: Chronic | ICD-10-CM

## 2017-12-05 DIAGNOSIS — E11.9 DIABETIC EYE EXAM: Primary | ICD-10-CM

## 2017-12-05 DIAGNOSIS — Z01.00 DIABETIC EYE EXAM: Primary | ICD-10-CM

## 2017-12-05 DIAGNOSIS — Z01.00 DIABETIC EYE EXAM: ICD-10-CM

## 2017-12-05 DIAGNOSIS — I10 ESSENTIAL HYPERTENSION: Chronic | ICD-10-CM

## 2017-12-05 DIAGNOSIS — E11.9 DIABETES MELLITUS WITH COINCIDENT HYPERTENSION: Chronic | ICD-10-CM

## 2017-12-05 DIAGNOSIS — E11.9 TYPE 2 DIABETES MELLITUS WITHOUT COMPLICATION, WITHOUT LONG-TERM CURRENT USE OF INSULIN: ICD-10-CM

## 2017-12-05 DIAGNOSIS — K21.9 GASTROESOPHAGEAL REFLUX DISEASE WITHOUT ESOPHAGITIS: ICD-10-CM

## 2017-12-05 DIAGNOSIS — E11.9 TYPE 2 DIABETES MELLITUS WITHOUT COMPLICATION, WITHOUT LONG-TERM CURRENT USE OF INSULIN: Chronic | ICD-10-CM

## 2017-12-05 DIAGNOSIS — E11.9 DIABETIC EYE EXAM: ICD-10-CM

## 2017-12-05 DIAGNOSIS — F03.90 DEMENTIA WITHOUT BEHAVIORAL DISTURBANCE, UNSPECIFIED DEMENTIA TYPE: Primary | Chronic | ICD-10-CM

## 2017-12-05 DIAGNOSIS — E78.5 HYPERLIPIDEMIA, UNSPECIFIED HYPERLIPIDEMIA TYPE: ICD-10-CM

## 2017-12-05 LAB
CREAT UR-MCNC: 259 MG/DL
MICROALBUMIN UR DL<=1MG/L-MCNC: 266 UG/ML
MICROALBUMIN/CREATININE RATIO: 102.7 UG/MG

## 2017-12-05 PROCEDURE — 82570 ASSAY OF URINE CREATININE: CPT

## 2017-12-05 PROCEDURE — 99999 PR PBB SHADOW E&M-EST. PATIENT-LVL I: CPT | Mod: PBBFAC,,,

## 2017-12-05 PROCEDURE — 99213 OFFICE O/P EST LOW 20 MIN: CPT | Mod: S$GLB,,, | Performed by: EMERGENCY MEDICINE

## 2017-12-05 PROCEDURE — 99999 PR PBB SHADOW E&M-EST. PATIENT-LVL IV: CPT | Mod: PBBFAC,,, | Performed by: EMERGENCY MEDICINE

## 2017-12-05 PROCEDURE — 99499 UNLISTED E&M SERVICE: CPT | Mod: S$GLB,,, | Performed by: EMERGENCY MEDICINE

## 2017-12-05 RX ORDER — AMLODIPINE BESYLATE 10 MG/1
10 TABLET ORAL DAILY
Qty: 90 TABLET | Refills: 3 | Status: SHIPPED | OUTPATIENT
Start: 2017-12-05 | End: 2018-09-14 | Stop reason: SDUPTHER

## 2017-12-05 RX ORDER — ATORVASTATIN CALCIUM 40 MG/1
40 TABLET, FILM COATED ORAL DAILY
Qty: 90 TABLET | Refills: 3 | Status: SHIPPED | OUTPATIENT
Start: 2017-12-05 | End: 2018-09-14 | Stop reason: SDUPTHER

## 2017-12-05 RX ORDER — OMEPRAZOLE 20 MG/1
20 CAPSULE, DELAYED RELEASE ORAL DAILY
Qty: 90 CAPSULE | Refills: 3 | Status: SHIPPED | OUTPATIENT
Start: 2017-12-05 | End: 2018-09-14 | Stop reason: SDUPTHER

## 2017-12-05 RX ORDER — DONEPEZIL HYDROCHLORIDE 5 MG/1
5 TABLET, FILM COATED ORAL NIGHTLY
Qty: 90 TABLET | Refills: 3 | Status: SHIPPED | OUTPATIENT
Start: 2017-12-05 | End: 2018-09-14 | Stop reason: SDUPTHER

## 2017-12-05 NOTE — PROGRESS NOTES
Subjective:   THIS NOTE IS DONE WITH VOICE RECOGNITION        Patient ID: Mayra Knott is a 85 y.o. male.    Chief Complaint: type 2 diabetes mellitus without complication, without long-      HPI     He does watch rinse today to discuss his diabetes and other medical issues.    Of more concern to his wife is his increasing dementia.  He remains very pleasant.  He has very little self-motivation.  He does not go outside anymore.  He occasionally will help around the house.  He remains capable of operating a vehicle, by his wife's account.  He does get lost, so he never goes without her.  There have been no accidents.    He does provide self care for himself.  No recognized hallucinations either visual or auditory.    The increasing concerns about loss of memory are evident.    His wife mentions that he sleeping more than 12 hours a day.  This is not his typical pattern, but over the last year to it's gradually increased to this length of sleeping.    His diabetes is been stable.  He has not had any hypoglycemia.  He does have frequent urination sometimes up to 4 times a night.  Occasional incontinence.    Results for MAYRA KNOTT (MRN [id number]) as of 12/10/2017 14:14   Ref. Range 8/23/2016 12:29 11/22/2016 10:51 3/27/2017 12:42 8/17/2017 11:43 12/5/2017 12:17   Hemoglobin A1C Latest Ref Range: 4.0 - 5.6 % 8.1 (H) 9.1 (H) 6.9 (H) 6.8 (H) 7.9 (H)   Estimated Avg Glucose Latest Ref Range: 68 - 131 mg/dL 186 (H) 214 (H) 151 (H) 148 (H) 180 (H)       Fasting sugars in the 130-150 range.    Results for MAYRA KNOTT (MRN [id number]) as of 12/5/2017 11:16   Ref. Range 4/18/2017 09:37   Cholesterol Latest Ref Range: 120 - 199 mg/dL 183   HDL Latest Ref Range: 40 - 75 mg/dL 43   LDL Cholesterol Latest Ref Range: 63.0 - 159.0 mg/dL 108.0   Total Cholesterol/HDL Ratio Latest Ref Range: 2.0 - 5.0  4.3   Triglycerides Latest Ref Range: 30 - 150 mg/dL 160 (H)       Hypertension appears controlled.  He is not taking his blood  pressure at home.  No mention of any angina symptoms, the only cardiovascular system is possibly some shortness of breath.  This is compounded by the fact that he does very little, and any type of exercise seems to cause him to come short of breath.  He does not complain of chest pain with this.    BP Readings from Last 3 Encounters:   12/05/17 122/60   08/17/17 120/60   03/27/17 130/60       Current Outpatient Prescriptions   Medication Sig Dispense Refill    acetaminophen (TYLENOL) 325 MG tablet Take 325 mg by mouth every 6 (six) hours as needed for Pain.      amlodipine (NORVASC) 10 MG tablet Take 1 tablet (10 mg total) by mouth once daily. 90 tablet 3    aspirin 81 mg Tab 1 Tablet(s) Oral PRN Every day.        atorvastatin (LIPITOR) 40 MG tablet Take 1 tablet (40 mg total) by mouth once daily. 90 tablet 3    blood sugar diagnostic Strp use contour test strips two times a day. DX: E11.9 200 strip 3    donepezil (ARICEPT) 5 MG tablet Take 1 tablet (5 mg total) by mouth every evening. 90 tablet 3    fluocinonide (LIDEX) 0.05 % ointment Apply topically 2 (two) times daily. 60 g 1    fluticasone (FLONASE) 50 mcg/actuation nasal spray 2 sprays by Each Nare route once daily. 16 g 11    glimepiride (AMARYL) 1 MG tablet Take 1 tablet (1 mg total) by mouth daily with breakfast. 90 tablet 3    lisinopril (PRINIVIL,ZESTRIL) 5 MG tablet Take 1 tablet (5 mg total) by mouth once daily. 90 tablet 3    loperamide (IMODIUM) 2 mg capsule Take 2 mg by mouth 4 (four) times daily as needed for Diarrhea.      MV, MIN #36/IRON,CARBONYL/FA (GERITOL COMPLETE ORAL) Take by mouth.      omeprazole (PRILOSEC) 20 MG capsule Take 1 capsule (20 mg total) by mouth once daily. 90 capsule 3    penicillin v potassium (VEETID) 500 MG tablet Take 500 tablets by mouth 3 (three) times daily.      sulfamethoxazole-trimethoprim 800-160mg (BACTRIM DS) 800-160 mg Tab        No current facility-administered medications for this visit.         Review of Systems   Constitutional: Negative for activity change, chills, fever and unexpected weight change.   HENT: Negative for hearing loss, sinus pressure and trouble swallowing.    Eyes: Negative for discharge and itching.   Respiratory: Negative for cough, choking, chest tightness, shortness of breath and wheezing.    Cardiovascular: Negative for chest pain, palpitations and leg swelling.   Gastrointestinal: Negative for abdominal distention and diarrhea.   Endocrine: Positive for polyuria.   Genitourinary: Positive for frequency. Negative for difficulty urinating, dysuria, flank pain, hematuria and urgency.   Musculoskeletal: Negative for arthralgias, back pain and joint swelling.   Skin: Negative for wound.   Neurological: Negative for dizziness, tremors, seizures, numbness and headaches.        Continued issues with dementia, gradually losing ground.   Psychiatric/Behavioral: Positive for confusion. Negative for dysphoric mood and hallucinations.       Objective:      Physical Exam   Constitutional: He is oriented to person, place, and time. He appears well-developed and well-nourished. No distress.   HENT:   Head: Normocephalic and atraumatic.   Right Ear: External ear normal.   Left Ear: External ear normal.   Nose: Nose normal.   Mouth/Throat: Oropharynx is clear and moist. No oropharyngeal exudate.   Eyes: Conjunctivae and EOM are normal. Pupils are equal, round, and reactive to light. No scleral icterus.   Neck: Normal range of motion. Neck supple. No thyromegaly present.   Cardiovascular: Normal rate, regular rhythm and normal heart sounds.    Pulmonary/Chest: Effort normal and breath sounds normal. He has no wheezes. He has no rales.   Abdominal: Soft. Bowel sounds are normal. He exhibits no distension. There is no tenderness.   Musculoskeletal: Normal range of motion. He exhibits no edema or tenderness.   Lymphadenopathy:     He has no cervical adenopathy.   Neurological: He is alert and  oriented to person, place, and time. He has normal strength. He displays no tremor. No cranial nerve deficit or sensory deficit. He exhibits normal muscle tone. Coordination and gait normal.   Reflex Scores:       Brachioradialis reflexes are 1+ on the right side and 1+ on the left side.       Patellar reflexes are 2+ on the right side and 2+ on the left side.       Achilles reflexes are 1+ on the right side and 1+ on the left side.  Significant compromise of short-term memory.    Motor skills appear to be intact.       Skin: Skin is warm and dry. No rash noted.   Psychiatric: He has a normal mood and affect. His behavior is normal.   Vitals reviewed.      Assessment:       1. Dementia without behavioral disturbance, unspecified dementia type    2. Diabetes mellitus with coincident hypertension    3. Essential hypertension    4. Gastroesophageal reflux disease without esophagitis    5. Hyperlipidemia, unspecified hyperlipidemia type    6. Memory changes        Plan:       1. Dementia without behavioral disturbance, unspecified dementia type  Slow progression with increasing sleeping  No hallucinations or behavior issues  He should not leave home without his wife.    - donepezil (ARICEPT) 5 MG tablet; Take 1 tablet (5 mg total) by mouth every evening.  Dispense: 90 tablet; Refill: 3    2. Diabetes mellitus with coincident hypertension  He has lost some control   We'll increase Amaryl to 2 mg per day  Recheck hemoglobin A1c in three months.    3. Essential hypertension  Controlled.  Continue current medications  Continue to avoid excessive sodium  Continue home monitoring  Target blood pressure is 139/89 or less    - amLODIPine (NORVASC) 10 MG tablet; Take 1 tablet (10 mg total) by mouth once daily.  Dispense: 90 tablet; Refill: 3    4. Gastroesophageal reflux disease without esophagitis  Controlled  Continue current medications.    - omeprazole (PRILOSEC) 20 MG capsule; Take 1 capsule (20 mg total) by mouth once  daily.  Dispense: 90 capsule; Refill: 3    5. Hyperlipidemia, unspecified hyperlipidemia type  Acceptable LDL control  No changes  Recheck lipids annually    - atorvastatin (LIPITOR) 40 MG tablet; Take 1 tablet (40 mg total) by mouth once daily.  Dispense: 90 tablet; Refill: 3    6. Memory changes  See above.

## 2017-12-05 NOTE — PROGRESS NOTES
Darnell Knott is a 85 y.o. male here for a diabetic eye screening with non-dilated fundus photos per Dr Caldera.    Patient cooperative?: Yes  Small pupils?: Yes  Last eye exam: 9/14/16    For exam results, see Encounter Report.

## 2017-12-10 RX ORDER — GLIMEPIRIDE 1 MG/1
2 TABLET ORAL
Qty: 180 TABLET | Refills: 1 | Status: SHIPPED | OUTPATIENT
Start: 2017-12-10 | End: 2018-07-31 | Stop reason: SDUPTHER

## 2018-01-03 NOTE — TELEPHONE ENCOUNTER
Glimperide was sent in 12/17/17   Please send in to [facility] for diagnostic test strips/ she does not use [facility].   william has glimperide rx.

## 2018-01-03 NOTE — TELEPHONE ENCOUNTER
----- Message from Pam Herron sent at 1/3/2018 10:00 AM CST -----  Refill on Rx Glimepiride 2 mg once daily.  Please send into [facility] pharmacy.  Please call wife Love Knott at [phone].  Also fax to [facility], diabetic test strips Contour.

## 2018-01-05 DIAGNOSIS — E11.9 TYPE 2 DIABETES MELLITUS WITHOUT COMPLICATION, WITHOUT LONG-TERM CURRENT USE OF INSULIN: Primary | ICD-10-CM

## 2018-01-05 NOTE — TELEPHONE ENCOUNTER
Needs to get new machine that is covered on [employer] managed [employer]are. I have spent 30 min on phone and still could not get name of machine that is covered by [employer].

## 2018-02-08 ENCOUNTER — OFFICE VISIT (OUTPATIENT)
Dept: FAMILY MEDICINE | Facility: CLINIC | Age: 83
End: 2018-02-08
Payer: MEDICARE

## 2018-02-08 VITALS
WEIGHT: 151 LBS | DIASTOLIC BLOOD PRESSURE: 62 MMHG | BODY MASS INDEX: 27.79 KG/M2 | SYSTOLIC BLOOD PRESSURE: 130 MMHG | HEIGHT: 62 IN | HEART RATE: 55 BPM

## 2018-02-08 DIAGNOSIS — I10 DIABETES MELLITUS WITH COINCIDENT HYPERTENSION: Chronic | ICD-10-CM

## 2018-02-08 DIAGNOSIS — E78.5 HYPERLIPIDEMIA, UNSPECIFIED HYPERLIPIDEMIA TYPE: Chronic | ICD-10-CM

## 2018-02-08 DIAGNOSIS — H52.203 MYOPIA OF BOTH EYES WITH ASTIGMATISM AND PRESBYOPIA: ICD-10-CM

## 2018-02-08 DIAGNOSIS — Z96.1 PSEUDOPHAKIA: ICD-10-CM

## 2018-02-08 DIAGNOSIS — H52.4 MYOPIA OF BOTH EYES WITH ASTIGMATISM AND PRESBYOPIA: ICD-10-CM

## 2018-02-08 DIAGNOSIS — F03.90 DEMENTIA WITHOUT BEHAVIORAL DISTURBANCE, UNSPECIFIED DEMENTIA TYPE: Chronic | ICD-10-CM

## 2018-02-08 DIAGNOSIS — E11.9 DIABETES MELLITUS WITH COINCIDENT HYPERTENSION: Chronic | ICD-10-CM

## 2018-02-08 DIAGNOSIS — H52.13 MYOPIA OF BOTH EYES WITH ASTIGMATISM AND PRESBYOPIA: ICD-10-CM

## 2018-02-08 DIAGNOSIS — I10 ESSENTIAL HYPERTENSION: Chronic | ICD-10-CM

## 2018-02-08 DIAGNOSIS — Z00.00 ENCOUNTER FOR PREVENTIVE HEALTH EXAMINATION: Primary | ICD-10-CM

## 2018-02-08 PROCEDURE — 99215 OFFICE O/P EST HI 40 MIN: CPT | Mod: PO | Performed by: NURSE PRACTITIONER

## 2018-02-08 PROCEDURE — 99999 PR PBB SHADOW E&M-EST. PATIENT-LVL V: CPT | Mod: PBBFAC,,, | Performed by: NURSE PRACTITIONER

## 2018-02-08 PROCEDURE — 99499 UNLISTED E&M SERVICE: CPT | Mod: S$GLB,,, | Performed by: NURSE PRACTITIONER

## 2018-02-08 PROCEDURE — G0439 PPPS, SUBSEQ VISIT: HCPCS | Mod: S$GLB,,, | Performed by: NURSE PRACTITIONER

## 2018-02-08 NOTE — PATIENT INSTRUCTIONS
Counseling and Referral of Other Preventative  (Italic type indicates deductible and co-insurance are waived)    Patient Name: Darnell Knott  Today's Date: 2/8/2018    Health Maintenance       Date Due Completion Date    TETANUS VACCINE 09/23/1950 ---    Eye Exam 09/14/2017 9/14/2016    Lipid Panel 04/18/2018 4/18/2017    Hemoglobin A1c 06/05/2018 12/5/2017    Foot Exam 08/17/2018 8/17/2017        No orders of the defined types were placed in this encounter.    The following information is provided to all patients.  This information is to help you find resources for any of the problems found today that may be affecting your health:                Living healthy guide: www.[facility].[place].gov      Understanding Diabetes: www.diabetes.org      Eating healthy: www.cdc.gov/healthyweight      CDC home safety checklist: www.cdc.gov/steadi/patient.html      Agency on Aging: www.goea.[place].[facility]      Alcoholics anonymous (AA): www.aa.org      Physical Activity: www.aj.nih.gov/xy2srxc      Tobacco use: www.quitwithusla.org

## 2018-02-08 NOTE — PROGRESS NOTES
"Darnell Knott presented for a  Medicare AWV and comprehensive Health Risk Assessment today. The following components were reviewed and updated:    · Medical history  · Family History  · Social history  · Allergies and Current Medications  · Health Risk Assessment  · Health Maintenance  · Care Team     Review of Systems   Constitutional: Negative for chills, fever, malaise/fatigue and weight loss.   Respiratory: Negative for cough, shortness of breath and wheezing.    Cardiovascular: Negative for chest pain.   Gastrointestinal: Negative for abdominal pain, blood in stool, constipation, diarrhea, nausea and vomiting.   Skin: Negative for rash.   Neurological: Negative for dizziness, weakness and headaches.   Psychiatric/Behavioral: Positive for memory loss.     ** See Completed Assessments for Annual Wellness Visit within the encounter summary.**     The following assessments were completed:  · Living Situation  · CAGE  · Depression Screening  · Timed Get Up and Go  · Whisper Test  · Cognitive Function Screening      · Nutrition Screening  · ADL Screening  · PAQ Screening    Vitals:    02/08/18 0938   BP: 130/62   BP Location: Left arm   Patient Position: Sitting   BP Method: Medium (Automatic)   Pulse: (!) 55   Weight: 68.5 kg (151 lb 0.2 oz)   Height: 5' 2" (1.575 m)     Body mass index is 27.62 kg/m².  Physical Exam   Constitutional: He is oriented to person, place, and time. He appears well-nourished.   Cardiovascular: Normal rate, regular rhythm, normal heart sounds and intact distal pulses.    Pulmonary/Chest: Effort normal and breath sounds normal. He has no wheezes. He has no rales.   Neurological: He is alert and oriented to person, place, and time.   Skin: Skin is warm and dry. No rash noted.   Vitals reviewed.        Diagnoses and health risks identified today and associated recommendations/orders:    1. Encounter for preventive health examination  Reviewed and discussed health maintenance.    Declined " tetanus vaccine today  - Ambulatory referral to Optometry    2. Hyperlipidemia, unspecified hyperlipidemia type  Stable- continue current treatment and follow up routinely with PCP     3. Essential hypertension  Stable- continue current treatment and follow up routinely with PCP     4. Diabetes mellitus with coincident hypertension  Stable- continue current treatment and follow up routinely with PCP   Last A!C=7.9  - Ambulatory referral to Optometry    5. Dementia without behavioral disturbance, unspecified dementia type  Stable- continue current treatment and follow up routinely with PCP     6. Pseudophakia  - Ambulatory referral to Optometry    7. Myopia of both eyes with astigmatism and presbyopia  - Ambulatory referral to Optometry    I offered to discuss end of life issues, including information on how to make advance directives that the patient could use to name someone who would make medical decisions on their behalf if they became too ill to make themselves.    ___Patient declined  _X_Patient is interested, I provided paper work and offered to discuss.    Provided Relious with a 5-10 year written screening schedule and personal prevention plan. Recommendations were developed using the USPSTF age appropriate recommendations. Education, counseling, and referrals were provided as needed. After Visit Summary printed and given to patient which includes a list of additional screenings\tests needed.    Patricia Claire NP

## 2018-03-15 ENCOUNTER — OFFICE VISIT (OUTPATIENT)
Dept: OPTOMETRY | Facility: CLINIC | Age: 83
End: 2018-03-15
Payer: MEDICARE

## 2018-03-15 DIAGNOSIS — Z13.5 GLAUCOMA SCREENING: ICD-10-CM

## 2018-03-15 DIAGNOSIS — H43.819 POSTERIOR VITREOUS DETACHMENT, UNSPECIFIED LATERALITY: ICD-10-CM

## 2018-03-15 DIAGNOSIS — Z96.1 BILATERAL PSEUDOPHAKIA: ICD-10-CM

## 2018-03-15 DIAGNOSIS — E11.9 DIABETES MELLITUS TYPE 2 WITHOUT RETINOPATHY: Primary | ICD-10-CM

## 2018-03-15 DIAGNOSIS — H52.203 MYOPIA WITH ASTIGMATISM, BILATERAL: ICD-10-CM

## 2018-03-15 DIAGNOSIS — H52.13 MYOPIA WITH ASTIGMATISM, BILATERAL: ICD-10-CM

## 2018-03-15 PROCEDURE — 92014 COMPRE OPH EXAM EST PT 1/>: CPT | Mod: S$GLB,,, | Performed by: OPTOMETRIST

## 2018-03-15 PROCEDURE — 99499 UNLISTED E&M SERVICE: CPT | Mod: S$GLB,,, | Performed by: OPTOMETRIST

## 2018-03-15 PROCEDURE — 92015 DETERMINE REFRACTIVE STATE: CPT | Mod: S$GLB,,, | Performed by: OPTOMETRIST

## 2018-03-15 PROCEDURE — 99999 PR PBB SHADOW E&M-EST. PATIENT-LVL III: CPT | Mod: PBBFAC,,, | Performed by: OPTOMETRIST

## 2018-03-15 NOTE — LETTER
March 15, 2018      Patricia Claire, ELVIE  [address] [facility] [address] [place] [address]           [facility]  [address] [facility] [address] [place] [address]  Phone: [phone]  Fax: [phone]          Patient: Darnell Knott   MR Number: [id number]   YOB: 1932   Date of Visit: 3/15/2018       Dear Patricia Claire:    Thank you for referring Darnell Knott to me for evaluation. Attached you will find relevant portions of my assessment and plan of care.    If you have questions, please do not hesitate to call me. I look forward to following Darnell Knott along with you.    Sincerely,    DILIA Roper, OD    Enclosure  CC:  No Recipients    If you would like to receive this communication electronically, please contact [email or web address] or [phone] to request more information on [employer] access.    For providers and/or their staff who would like to refer a patient to [facility], please contact us through our one-stop-shop provider referral line, [facility] , at [phone].    If you feel you have received this communication in error or would no longer like to receive these types of communications, please e-mail [email or web address]

## 2018-03-15 NOTE — PROGRESS NOTES
HPI     Annual Exam    Additional comments: DLE 9-16 (roddy)    ocular health exam            Diabetic Eye Exam    Additional comments: BSL controlled           Blurred Vision    Additional comments: at distance only -- near [facility] good // pt has bifocals   but does not wear           Comments   Hemoglobin A1C       Date                     Value               Ref Range             Status                12/05/2017               7.9 (H)             4.0 - 5.6 %           Final              Comment:    According to ADA guidelines, hemoglobin A1c <7.0% represents  optimal   control in non-pregnant diabetic patients. Different  metrics may apply to   specific patient populations.   Standards of Medical Care in   Diabetes-2016.  For the purpose of screening for the presence of   diabetes:  <5.7%     Consistent with the absence of diabetes  5.7-6.4%    Consistent with increasing risk for diabetes   (prediabetes)  >or=6.5%    Consistent with diabetes  Currently, no consensus exists for use of   hemoglobin A1c  for diagnosis of diabetes for children.  This Hemoglobin   A1c assay has significant interference with fetal   hemoglobin   (HbF).   The results are invalid for patients with abnormal amounts of   HbF,     including those with known Hereditary Persistence   of Fetal Hemoglobin.   Heterozygous hemoglobin variants (HbAS, HbAC,   HbAD, HbAE, HbA2) do not   significantly interfere with this assay;   however, presence of multiple   variants in a sample may impact the %   interference.         08/17/2017               6.8 (H)             4.0 - 5.6 %           Final              Comment:    According to ADA guidelines, hemoglobin A1c <7.0% represents  optimal   control in non-pregnant diabetic patients. Different  metrics may apply to   specific patient populations.   Standards of Medical Care in   Diabetes-2016.  For the purpose of screening for the presence of   diabetes:  <5.7%     Consistent with the absence of  diabetes  5.7-6.4%    Consistent with increasing risk for diabetes   (prediabetes)  >or=6.5%    Consistent with diabetes  Currently, no consensus exists for use of   hemoglobin A1c  for diagnosis of diabetes for children.  This Hemoglobin   A1c assay has significant interference with fetal   hemoglobin   (HbF).   The results are invalid for patients with abnormal amounts of   HbF,     including those with known Hereditary Persistence   of Fetal Hemoglobin.   Heterozygous hemoglobin variants (HbAS, HbAC,   HbAD, HbAE, HbA2) do not   significantly interfere with this assay;   however, presence of multiple   variants in a sample may impact the %   interference.         03/27/2017               6.9 (H)             4.5 - 6.2 %           Final              Comment:    According to ADA guidelines, hemoglobin A1C <7.0% represents  optimal   control in non-pregnant diabetic patients.  Different  metrics may apply   to specific populations.   Standards of Medical Care in Diabetes -   2016.  For the purpose of screening for the presence of diabetes:  <5.7%       Consistent with the absence of diabetes  5.7-6.4%  Consistent with   increasing risk for diabetes   (prediabetes)  >or=6.5%  Consistent with   diabetes  Currently no consensus exists for use of hemoglobin A1C  for   diagnosis of diabetes for children.    ----------    Moderate DM control         Last edited by DILIA Roper OD on 3/15/2018 10:49 AM. (History)        ROS     Positive for: Eyes    Negative for: Constitutional, Gastrointestinal, Neurological, Skin,   Genitourinary, Musculoskeletal, HENT, Endocrine, Cardiovascular,   Respiratory, Psychiatric, Allergic/Imm, Heme/Lymph    Last edited by DILIA Roper OD on 3/15/2018 10:49 AM. (History)        Assessment /Plan     For exam results, see Encounter Report.    Diabetes mellitus type 2 without retinopathy    Posterior vitreous detachment, unspecified laterality    Glaucoma screening    Bilateral  pseudophakia    Myopia with astigmatism, bilateral      1. No ret/ no csme, gave info, control glucose, annual DFE  2. RD precautions given  3. Not suspect  4. Stable OU  5. Updated specs rx, gave copy, fill prn    Discussed and educated patient on current findings /plan.  RTC 1 year, prn if any changes / issues

## 2018-04-05 ENCOUNTER — OFFICE VISIT (OUTPATIENT)
Dept: FAMILY MEDICINE | Facility: CLINIC | Age: 83
End: 2018-04-05
Payer: MEDICARE

## 2018-04-05 ENCOUNTER — LAB VISIT (OUTPATIENT)
Dept: LAB | Facility: HOSPITAL | Age: 83
End: 2018-04-05
Attending: EMERGENCY MEDICINE
Payer: MEDICARE

## 2018-04-05 VITALS
HEIGHT: 62 IN | WEIGHT: 148.13 LBS | SYSTOLIC BLOOD PRESSURE: 102 MMHG | RESPIRATION RATE: 17 BRPM | BODY MASS INDEX: 27.26 KG/M2 | HEART RATE: 60 BPM | DIASTOLIC BLOOD PRESSURE: 60 MMHG | OXYGEN SATURATION: 97 %

## 2018-04-05 DIAGNOSIS — R53.82 CHRONIC FATIGUE: ICD-10-CM

## 2018-04-05 DIAGNOSIS — E78.5 HYPERLIPIDEMIA, UNSPECIFIED HYPERLIPIDEMIA TYPE: Chronic | ICD-10-CM

## 2018-04-05 DIAGNOSIS — R32 URINARY INCONTINENCE, UNSPECIFIED TYPE: ICD-10-CM

## 2018-04-05 DIAGNOSIS — I10 DIABETES MELLITUS WITH COINCIDENT HYPERTENSION: Chronic | ICD-10-CM

## 2018-04-05 DIAGNOSIS — Z85.46 HISTORY OF PROSTATE CANCER: Chronic | ICD-10-CM

## 2018-04-05 DIAGNOSIS — I10 ESSENTIAL HYPERTENSION: Chronic | ICD-10-CM

## 2018-04-05 DIAGNOSIS — E11.9 DIABETES MELLITUS WITH COINCIDENT HYPERTENSION: Chronic | ICD-10-CM

## 2018-04-05 DIAGNOSIS — F03.90 DEMENTIA WITHOUT BEHAVIORAL DISTURBANCE, UNSPECIFIED DEMENTIA TYPE: Primary | Chronic | ICD-10-CM

## 2018-04-05 LAB
ALBUMIN SERPL BCP-MCNC: 4.1 G/DL
ALP SERPL-CCNC: 93 U/L
ALT SERPL W/O P-5'-P-CCNC: 52 U/L
ANION GAP SERPL CALC-SCNC: 12 MMOL/L
AST SERPL-CCNC: 54 U/L
BASOPHILS # BLD AUTO: 0.07 K/UL
BASOPHILS NFR BLD: 0.8 %
BILIRUB SERPL-MCNC: 0.9 MG/DL
BUN SERPL-MCNC: 14 MG/DL
CALCIUM SERPL-MCNC: 9.7 MG/DL
CHLORIDE SERPL-SCNC: 102 MMOL/L
CHOLEST SERPL-MCNC: 168 MG/DL
CHOLEST/HDLC SERPL: 4.8 {RATIO}
CO2 SERPL-SCNC: 26 MMOL/L
COMPLEXED PSA SERPL-MCNC: <0.01 NG/ML
CREAT SERPL-MCNC: 0.9 MG/DL
DIFFERENTIAL METHOD: ABNORMAL
EOSINOPHIL # BLD AUTO: 0.3 K/UL
EOSINOPHIL NFR BLD: 3.8 %
ERYTHROCYTE [DISTWIDTH] IN BLOOD BY AUTOMATED COUNT: 14.9 %
EST. GFR  (AFRICAN AMERICAN): >60 ML/MIN/1.73 M^2
EST. GFR  (NON AFRICAN AMERICAN): >60 ML/MIN/1.73 M^2
ESTIMATED AVG GLUCOSE: 189 MG/DL
GLUCOSE SERPL-MCNC: 158 MG/DL
HBA1C MFR BLD HPLC: 8.2 %
HCT VFR BLD AUTO: 44 %
HDLC SERPL-MCNC: 35 MG/DL
HDLC SERPL: 20.8 %
HGB BLD-MCNC: 13.2 G/DL
IMM GRANULOCYTES # BLD AUTO: 0.03 K/UL
IMM GRANULOCYTES NFR BLD AUTO: 0.3 %
LDLC SERPL CALC-MCNC: 102.8 MG/DL
LYMPHOCYTES # BLD AUTO: 1.8 K/UL
LYMPHOCYTES NFR BLD: 20.9 %
MCH RBC QN AUTO: 23.7 PG
MCHC RBC AUTO-ENTMCNC: 30 G/DL
MCV RBC AUTO: 79 FL
MONOCYTES # BLD AUTO: 0.7 K/UL
MONOCYTES NFR BLD: 7.9 %
NEUTROPHILS # BLD AUTO: 5.8 K/UL
NEUTROPHILS NFR BLD: 66.3 %
NONHDLC SERPL-MCNC: 133 MG/DL
NRBC BLD-RTO: 0 /100 WBC
PLATELET # BLD AUTO: 409 K/UL
PMV BLD AUTO: 10.4 FL
POTASSIUM SERPL-SCNC: 3.6 MMOL/L
PROT SERPL-MCNC: 8 G/DL
RBC # BLD AUTO: 5.57 M/UL
SODIUM SERPL-SCNC: 140 MMOL/L
TRIGL SERPL-MCNC: 151 MG/DL
TSH SERPL DL<=0.005 MIU/L-ACNC: 2.13 UIU/ML
WBC # BLD AUTO: 8.71 K/UL

## 2018-04-05 PROCEDURE — 99499 UNLISTED E&M SERVICE: CPT | Mod: S$GLB,,, | Performed by: EMERGENCY MEDICINE

## 2018-04-05 PROCEDURE — 83036 HEMOGLOBIN GLYCOSYLATED A1C: CPT

## 2018-04-05 PROCEDURE — 3074F SYST BP LT 130 MM HG: CPT | Mod: CPTII,S$GLB,, | Performed by: EMERGENCY MEDICINE

## 2018-04-05 PROCEDURE — 85025 COMPLETE CBC W/AUTO DIFF WBC: CPT

## 2018-04-05 PROCEDURE — 84153 ASSAY OF PSA TOTAL: CPT

## 2018-04-05 PROCEDURE — 80061 LIPID PANEL: CPT

## 2018-04-05 PROCEDURE — 84443 ASSAY THYROID STIM HORMONE: CPT

## 2018-04-05 PROCEDURE — 3078F DIAST BP <80 MM HG: CPT | Mod: CPTII,S$GLB,, | Performed by: EMERGENCY MEDICINE

## 2018-04-05 PROCEDURE — 99999 PR PBB SHADOW E&M-EST. PATIENT-LVL IV: CPT | Mod: PBBFAC,,, | Performed by: EMERGENCY MEDICINE

## 2018-04-05 PROCEDURE — 80053 COMPREHEN METABOLIC PANEL: CPT

## 2018-04-05 PROCEDURE — 36415 COLL VENOUS BLD VENIPUNCTURE: CPT | Mod: PO

## 2018-04-05 PROCEDURE — 99214 OFFICE O/P EST MOD 30 MIN: CPT | Mod: S$GLB,,, | Performed by: EMERGENCY MEDICINE

## 2018-04-05 NOTE — PROGRESS NOTES
Subjective:   THIS NOTE IS DONE WITH VOICE RECOGNITION        Patient ID: Darnell Knott is a 85 y.o. male.    Chief Complaint: Dementia      HPI     There's been very little change in his behaviors since I last saw him.  He is sleeping more, but not significantly so.  He sleeps mostly in his chair.  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  His children continue to support him at a very high level and very effectively.    His diarrhea has cleared with reduction of his metformin.    He is very quiet, and has not expressed any new symptomatology to his wife.    He is continuing to use donepezil without problems.    His blood pressure has been well controlled.    BP Readings from Last 3 Encounters:   04/05/18 102/60   02/08/18 130/62   12/05/17 122/60        Lab Results   Component Value Date    HGBA1C 8.2 (H) 04/05/2018     Immunization History   Administered Date(s) Administered    Influenza - High Dose 12/14/2012, 09/24/2013, 10/03/2014, 10/27/2015, 11/22/2016, 10/19/2017    Influenza A (H1N1) 2009 Monovalent - IM 02/02/2010    Influenza Split 11/19/2009, 10/26/2010, 09/17/2011    Pneumococcal Conjugate 06/24/2004    Pneumococcal Conjugate - 13 Valent 06/24/2004, 02/24/2015    Pneumococcal Polysaccharide - 23 Valent 04/27/2016         Current Outpatient Prescriptions   Medication Sig Dispense Refill    acetaminophen (TYLENOL) 325 MG tablet Take 325 mg by mouth every 6 (six) hours as needed for Pain.      amLODIPine (NORVASC) 10 MG tablet Take 1 tablet (10 mg total) by mouth once daily. 90 tablet 3    aspirin 81 mg Tab 1 Tablet(s) Oral PRN Every day.        atorvastatin (LIPITOR) 40 MG tablet Take 1 tablet (40 mg total) by mouth once daily. 90 tablet 3    blood sugar diagnostic Strp use  test strips and lancets covered by insurance  two times a day. DX: E11.9 200 strip 3    diabetic supplies, SkyDoxcellan. Kit 1 Device by Misc.(Non-Drug; Combo Route) route once daily. Dx  E11.9  Use diabetic test kit covered by insurance. Use two times a day. 1 kit 1    donepezil (ARICEPT) 5 MG tablet Take 1 tablet (5 mg total) by mouth every evening. 90 tablet 3    fluocinonide (LIDEX) 0.05 % ointment Apply topically 2 (two) times daily. 60 g 1    fluticasone (FLONASE) 50 mcg/actuation nasal spray 2 sprays by Each Nare route once daily. 16 g 11    glimepiride (AMARYL) 1 MG tablet Take 2 tablets (2 mg total) by mouth daily with breakfast. 180 tablet 1    lisinopril (PRINIVIL,ZESTRIL) 5 MG tablet Take 1 tablet (5 mg total) by mouth once daily. 90 tablet 3    loperamide (IMODIUM) 2 mg capsule Take 2 mg by mouth 4 (four) times daily as needed for Diarrhea.      MV, MIN #36/IRON,CARBONYL/FA (GERITOL COMPLETE ORAL) Take by mouth.      omeprazole (PRILOSEC) 20 MG capsule Take 1 capsule (20 mg total) by mouth once daily. 90 capsule 3     No current facility-administered medications for this visit.          Review of Systems   Unable to perform ROS: Dementia       Objective:      Physical Exam   Constitutional: He is oriented to person, place, and time. He appears well-developed and well-nourished. No distress.   HENT:   Head: Normocephalic and atraumatic.   Right Ear: External ear normal.   Left Ear: External ear normal.   Nose: Nose normal.   Mouth/Throat: Oropharynx is clear and moist. No oropharyngeal exudate.   Eyes: Conjunctivae and EOM are normal. Pupils are equal, round, and reactive to light. No scleral icterus.   Neck: Normal range of motion. Neck supple. No thyromegaly present.   Cardiovascular: Normal rate, regular rhythm and normal heart sounds.    Pulmonary/Chest: Effort normal and breath sounds normal. He has no wheezes. He has no rales.   Abdominal: Soft. Bowel sounds are normal. He exhibits no distension. There is no tenderness.   Musculoskeletal: Normal range of motion. He exhibits no edema or tenderness.   Lymphadenopathy:     He has no cervical adenopathy.   Neurological: He is  alert and oriented to person, place, and time. He has normal reflexes. He exhibits normal muscle tone. Coordination normal.   During the exam, he is minimally interactive.  If asked directly he'll respond.  He is very pleasant.  He is easy to examine.  His exam is consistent with somebody with mild to moderate dementia.   Skin: Skin is warm and dry. No rash noted.   Psychiatric: He has a normal mood and affect. His behavior is normal.   Vitals reviewed.      Assessment:       1. Dementia without behavioral disturbance, unspecified dementia type    2. Diabetes mellitus with coincident hypertension    3. Essential hypertension    4. Hyperlipidemia, unspecified hyperlipidemia type    5. History of prostate cancer    6. Urinary incontinence, unspecified type    7. Chronic fatigue        Plan:       1. Dementia without behavioral disturbance, unspecified dementia type  Gradually progressing  Currently without behavioral issues  Continue current medications    2. Diabetes mellitus with coincident hypertension  Stable  Diarrhea has resolved  Update labs    - Comprehensive metabolic panel; Future  - Lipid panel; Future  - Hemoglobin A1c; Future    3. Essential hypertension  Controlled  Update labs  No change in medications  - Comprehensive metabolic panel; Future    4. Hyperlipidemia, unspecified hyperlipidemia type  Stable  No new interventions  We'll check annually.    - Comprehensive metabolic panel; Future  - Lipid panel; Future    5. History of prostate cancer  PSAs have been consistently at 0.01  Annual PSA, diagnostic recommended.    - PROSTATE SPECIFIC ANTIGEN, DIAGNOSTIC; Future    6. Urinary incontinence, unspecified type  Manageable with barrier methods  Do not anticipate additional interventions    7. Chronic fatigue  Secondary to his wife's concerns about increasing fatigue will document baseline labs    - TSH; Future  - CBC auto differential; Future      Results for MAYRA VINCENT (MRN [id number]) as of 4/8/2018  12:58   Ref. Range 1/2/2014 07:59 2/6/2015 10:03 2/23/2016 10:18 4/18/2017 09:37 4/5/2018 12:37   PSA DIAGNOSTIC Latest Ref Range: 0.00 - 4.00 ng/mL   <0.01 <0.01 <0.01

## 2018-04-05 NOTE — PATIENT INSTRUCTIONS
Relious,    You are doing very well.  We will check your blood work today to check your diabetes, kidney function, liver function, and your PSA.    Using a general vitamin may help with with your energy.    I would like to see you in six months.    MALATHI

## 2018-04-18 ENCOUNTER — TELEPHONE (OUTPATIENT)
Dept: FAMILY MEDICINE | Facility: CLINIC | Age: 83
End: 2018-04-18

## 2018-04-18 NOTE — TELEPHONE ENCOUNTER
She can dose once a day.  If she prefers to split the dose, two in the morning and one in the evening would be appropriate.    MALATHI

## 2018-04-18 NOTE — TELEPHONE ENCOUNTER
Mrs leonardo wants to make sure it is okay for pt to take amaryl 1 mg three tablets all at the same time. She has been giving him two in am and one at night.   Please advise if you want all three in the am.

## 2018-07-31 DIAGNOSIS — E78.5 HYPERLIPIDEMIA, UNSPECIFIED HYPERLIPIDEMIA TYPE: ICD-10-CM

## 2018-07-31 DIAGNOSIS — E11.9 TYPE 2 DIABETES MELLITUS WITHOUT COMPLICATION, WITHOUT LONG-TERM CURRENT USE OF INSULIN: ICD-10-CM

## 2018-07-31 DIAGNOSIS — I10 ESSENTIAL HYPERTENSION: ICD-10-CM

## 2018-07-31 RX ORDER — GLIMEPIRIDE 1 MG/1
TABLET ORAL
Qty: 270 TABLET | Refills: 1 | Status: SHIPPED | OUTPATIENT
Start: 2018-07-31 | End: 2018-10-14 | Stop reason: DRUGHIGH

## 2018-07-31 RX ORDER — LISINOPRIL 5 MG/1
5 TABLET ORAL DAILY
Qty: 90 TABLET | Refills: 3 | Status: SHIPPED | OUTPATIENT
Start: 2018-07-31 | End: 2019-01-21

## 2018-08-01 NOTE — TELEPHONE ENCOUNTER
Spoke with pt wife and encouraged meds has been filled and sent to [facility] pharmacy

## 2018-08-06 ENCOUNTER — TELEPHONE (OUTPATIENT)
Dept: FAMILY MEDICINE | Facility: CLINIC | Age: 83
End: 2018-08-06

## 2018-08-06 ENCOUNTER — OFFICE VISIT (OUTPATIENT)
Dept: PRIMARY CARE CLINIC | Facility: CLINIC | Age: 83
End: 2018-08-06
Payer: MEDICARE

## 2018-08-06 ENCOUNTER — LAB VISIT (OUTPATIENT)
Dept: LAB | Facility: HOSPITAL | Age: 83
End: 2018-08-06
Attending: NURSE PRACTITIONER
Payer: MEDICARE

## 2018-08-06 VITALS
DIASTOLIC BLOOD PRESSURE: 62 MMHG | BODY MASS INDEX: 27.7 KG/M2 | SYSTOLIC BLOOD PRESSURE: 140 MMHG | WEIGHT: 150.56 LBS | HEIGHT: 62 IN | HEART RATE: 60 BPM

## 2018-08-06 DIAGNOSIS — R31.9 PAINLESS HEMATURIA: Primary | ICD-10-CM

## 2018-08-06 DIAGNOSIS — R31.9 HEMATURIA, UNSPECIFIED TYPE: ICD-10-CM

## 2018-08-06 DIAGNOSIS — R31.9 HEMATURIA, UNSPECIFIED TYPE: Primary | ICD-10-CM

## 2018-08-06 LAB
BACTERIA #/AREA URNS HPF: ABNORMAL /HPF
BILIRUB UR QL STRIP: NEGATIVE
CLARITY UR: CLEAR
COLOR UR: YELLOW
GLUCOSE UR QL STRIP: ABNORMAL
HGB UR QL STRIP: ABNORMAL
KETONES UR QL STRIP: ABNORMAL
LEUKOCYTE ESTERASE UR QL STRIP: NEGATIVE
MICROSCOPIC COMMENT: ABNORMAL
NITRITE UR QL STRIP: NEGATIVE
PH UR STRIP: 6 [PH] (ref 5–8)
PROT UR QL STRIP: NEGATIVE
RBC #/AREA URNS HPF: 20 /HPF (ref 0–4)
SP GR UR STRIP: >=1.03 (ref 1–1.03)
URN SPEC COLLECT METH UR: ABNORMAL
WBC #/AREA URNS HPF: 3 /HPF (ref 0–5)
YEAST URNS QL MICRO: ABNORMAL

## 2018-08-06 PROCEDURE — 99999 PR PBB SHADOW E&M-EST. PATIENT-LVL V: CPT | Mod: PBBFAC,,, | Performed by: NURSE PRACTITIONER

## 2018-08-06 PROCEDURE — 99214 OFFICE O/P EST MOD 30 MIN: CPT | Mod: S$GLB,,, | Performed by: NURSE PRACTITIONER

## 2018-08-06 PROCEDURE — 87077 CULTURE AEROBIC IDENTIFY: CPT

## 2018-08-06 PROCEDURE — 87086 URINE CULTURE/COLONY COUNT: CPT

## 2018-08-06 PROCEDURE — 3078F DIAST BP <80 MM HG: CPT | Mod: CPTII,S$GLB,, | Performed by: NURSE PRACTITIONER

## 2018-08-06 PROCEDURE — 3077F SYST BP >= 140 MM HG: CPT | Mod: CPTII,S$GLB,, | Performed by: NURSE PRACTITIONER

## 2018-08-06 PROCEDURE — 81000 URINALYSIS NONAUTO W/SCOPE: CPT | Mod: PO

## 2018-08-06 PROCEDURE — 87088 URINE BACTERIA CULTURE: CPT

## 2018-08-06 PROCEDURE — 87186 SC STD MICRODIL/AGAR DIL: CPT

## 2018-08-06 NOTE — TELEPHONE ENCOUNTER
I spoke with pt and wife, he has this off and on 3-4 times in the last two weeks. He has no pain no fever. Just intermittent blood in urine.   appt made for today with Katharine Sharpe.

## 2018-08-06 NOTE — PATIENT INSTRUCTIONS
We'll do a culture to determine if this is an infection.  If so, we'll start on an antibiotic.  If not, I'll consult with Dr. Caldera.    If there is a lot of bleeding right away, call right away or go to the ER.    Evenings and weekends [facility] is available if symptoms worsen or fail to improve.  When you call the number, a registered nurse answers and takes your information.  The nurse can look at your medical record and make recommendations or call the on call physician, if warranted.      [facility] Address: [address] [place] [address] [address], [place] [address] Phone: [phone]    [facility] Address:  Address: [address] [place] [address] [place] [address] Phone: [phone]      If you have any questions, please call.  You can reach us at [phone] Monday through Thursday (except holidays) 10 a.m. to 6 p.m. or call Dr. Caldera/ELVIE Schultz    Thank you for using the [facility]!    HYACINTH Mascorro, CNP, FNP-BC  [facility]    To rate your experience with LOYD Mascorro, click on the link below:        [email or web address]

## 2018-08-06 NOTE — PROGRESS NOTES
Darnell Knott is a 85 y.o. male patient of Nixon Caldera Jr, MD who presents to the clinic today for   Chief Complaint   Patient presents with    Hematuria   .    HPI    Pt, who is not known to me, reports a new problem to me:  Blood in the urine intermittently over the past 2 weeks w/o pain.  After the surgery, the PSA elevated so he was put on shots.  That helped the PSA.  No longer gets an exam; it's too painful.  Dr. Caldera says the check is by blood work  Had had this in the past.  Checked for kidney stones.  Found to have a kidney infection.  .    These symptoms began 2 weels ago and status is intermittent.     Pt denies the following symptoms:  Fever, pain    Aggravating factors include none noted .    Relieving factors include none known .    OTC Medications tried are none.    Prescription medications taken for symptoms are medication.    Pertinent history:  Prostate cancer.  Hematuria in 9/2017.      ROS    Constitutional: No fever.    GI:  No stomach ache, no nausea, no vomiting.    Urinary:  No dysuria, no frequency, no urgency, no flank pain.     HEENT/Heart/Lung/MS/Skin:  No symptoms or no changes.      PAST MEDICAL HISTORY:  Past Medical History:   Diagnosis Date    Cancer     prostate    Colon polyps     Diabetes mellitus     HTN (hypertension) 4/30/2012    Hyperlipidemia     Hypertension     Kidney stone     Nephrolithiasis     Prostate cancer     s/p radical prostatectomy and radiotherapy    Trouble in sleeping     Type II or unspecified type diabetes mellitus without mention of complication, not stated as uncontrolled     Urinary incontinence        PAST SURGICAL HISTORY:  Past Surgical History:   Procedure Laterality Date    APPENDECTOMY      CATARACT EXTRACTION      od 8/0422.5 sa60at  os-8/04 22.5 sa60at    COLONOSCOPY W/ POLYPECTOMY  5/16/2008  Kemp    Cecum with 8-10 mm polyp.    ADENOMATOUS POLYP.    Hepatic flex with 8-10 mm polyp.    ADENOMATOUS POLYP.    Transverse colon  with two 8-10 mm polyps.  ADENOMATOUS POLYP.   Sigmoid diverticulosis.       COLONOSCOPY W/ POLYPECTOMY  11/30/2010  Eb    One 2 to 3 mm polyp in the hepatic flexure.  TUBULAR ADENOMA.   One 1 mm polyp in the distal ascending colon.  TUBULAR ADENOMA.   One 2 mm polyp in the cecum.  TUBULAR ADENOMA.   Diverticulosis sigmoid colon.    Tortuous sigmoid colon.       HERNIA REPAIR      HERNIA REPAIR      bilateral    PROSTATE SURGERY  2002    dr flores    prostatecomy      ROTATOR CUFF REPAIR      bilateral    UPPER GASTROINTESTINAL ENDOSCOPY  5/16/2008  Kemp    Reflux esophagitis.  No intestinal metaplasia.   Benign stricture, dilated 54 Fr.  Hiatal hernia.  Normal stomach and duodenum.       SOCIAL HISTORY:  Social History     Social History    Marital status:      Spouse name: N/A    Number of children: 3    Years of education: N/A     Occupational History    , farmer      retired     Social History Main Topics    Smoking status: Never Smoker    Smokeless tobacco: Current User    Alcohol use No      Comment: none    Drug use: No    Sexual activity: Yes     Partners: Female     Other Topics Concern    Not on file     Social History Narrative    No narrative on file       FAMILY HISTORY:  Family History   Problem Relation Age of Onset    Hypertension Father     Alcohol abuse Brother     Diabetes Sister     Amblyopia Neg Hx     Blindness Neg Hx     Cancer Neg Hx     Cataracts Neg Hx     Glaucoma Neg Hx     Retinal detachment Neg Hx     Strabismus Neg Hx     Stroke Neg Hx     Thyroid disease Neg Hx     Macular degeneration Neg Hx     Melanoma Neg Hx     Psoriasis Neg Hx     Lupus Neg Hx     Eczema Neg Hx        ALLERGIES AND MEDICATIONS: updated and reviewed.  Review of patient's allergies indicates:   Allergen Reactions    Morphine      Other reaction(s): Hallucinations     Current Outpatient Prescriptions   Medication Sig Dispense Refill     acetaminophen (TYLENOL) 325 MG tablet Take 325 mg by mouth every 6 (six) hours as needed for Pain.      amLODIPine (NORVASC) 10 MG tablet Take 1 tablet (10 mg total) by mouth once daily. 90 tablet 3    aspirin 81 mg Tab 1 Tablet(s) Oral PRN Every day.        atorvastatin (LIPITOR) 40 MG tablet Take 1 tablet (40 mg total) by mouth once daily. 90 tablet 3    blood sugar diagnostic Strp use  test strips and lancets covered by insurance  two times a day. DX: E11.9 200 strip 3    diabetic supplies, miscellan. Kit 1 Device by Misc.(Non-Drug; Combo Route) route once daily. Dx E11.9  Use diabetic test kit covered by insurance. Use two times a day. 1 kit 1    donepezil (ARICEPT) 5 MG tablet Take 1 tablet (5 mg total) by mouth every evening. 90 tablet 3    glimepiride (AMARYL) 1 MG tablet Two PO in am, one PO in the evening. (Patient taking differently: Take 3 mg by mouth daily with breakfast. Three in the AM) 270 tablet 1    lisinopril (PRINIVIL,ZESTRIL) 5 MG tablet Take 1 tablet (5 mg total) by mouth once daily. 90 tablet 3    loperamide (IMODIUM) 2 mg capsule Take 2 mg by mouth 4 (four) times daily as needed for Diarrhea.      MV, MIN #36/IRON,CARBONYL/FA (GERITOL COMPLETE ORAL) Take by mouth.      omeprazole (PRILOSEC) 20 MG capsule Take 1 capsule (20 mg total) by mouth once daily. 90 capsule 3    fluocinonide (LIDEX) 0.05 % ointment Apply topically 2 (two) times daily. 60 g 1    fluticasone (FLONASE) 50 mcg/actuation nasal spray 2 sprays by Each Nare route once daily. 16 g 11     No current facility-administered medications for this visit.          PHYSICAL EXAM    Alert, coop 85 y.o. male patient in no acute distress, is not ill-appearing.    Vitals:    08/06/18 1626   BP: (!) 140/62   Pulse: 60     VS reviewed.  VS stable.  CC, nursing note, medications & PMH all reviewed today.    Head:  Normocephalic, atraumatic.    EENT:  Ext nose/ears normal.               Eye lids normal, no discharge present.                        Resp:  Respirations even, unlabored             Lungs CTA bilat.    Heart:  Heart rate normal.  RRR, no MRG on ausculation.    ABD:  Soft, round, NT to palp.  Normal BS in all 4 quadrants.  No rebound or organomegaly.              No peritoneal signs.  No CVAT    MS:  Ambulates normally.      NEURO:  Alert and oriented x 4.  Responds appropriately during interaction.    Skin:  Warm, dry, color good..    Psych:  Responds appropriately throughout the visit.               Relaxed.  Well-groomed.    Painless hematuria  -     Urine culture      Pt today presents with report of intermittent painless hematuria for the past 2 weeks.      This is a new problem to me and the following work up is planned.    Lab & Radiological Tests Ordered: urinalysis--+ RBC, no WBC or bacteria.  Urine culture  The results are pending.    I have reviewed the following additional tests today: past urinalysis from Sept 2017 [facility] ER visit, past PSA values2      Pt advised to perform comfort measures recommended on patient instruction sheet .    If worse or concerns, the patient/wife is advised to call us.  Explained exam findings, diagnosis and treatment plan to patient and wife, who gives the history and is the caregiver.  Questions answered and patient states understanding.

## 2018-08-06 NOTE — TELEPHONE ENCOUNTER
----- Message from Marycruz Reynoso sent at 8/6/2018 11:06 AM CDT -----  Contact: Wife Love [phone]  She said he is passing blood in his urine several times over the last 2 weeks.  She would like advice as to who he should see. Please call her.  Thank you!

## 2018-08-06 NOTE — Clinical Note
Nixon Caldera Jr, MD,  I saw your patient today in the [facility].  If you have any questions, please do not hesitate to contact me.  Thank you!  LOYD Mascorro

## 2018-08-09 DIAGNOSIS — N39.0 ACUTE UTI: Primary | ICD-10-CM

## 2018-08-09 DIAGNOSIS — R31.9 PAINLESS HEMATURIA: ICD-10-CM

## 2018-08-09 LAB — BACTERIA UR CULT: NORMAL

## 2018-08-09 RX ORDER — SULFAMETHOXAZOLE AND TRIMETHOPRIM 800; 160 MG/1; MG/1
1 TABLET ORAL 2 TIMES DAILY
Qty: 14 TABLET | Refills: 0 | Status: SHIPPED | OUTPATIENT
Start: 2018-08-09 | End: 2018-08-16

## 2018-08-13 ENCOUNTER — TELEPHONE (OUTPATIENT)
Dept: PRIMARY CARE CLINIC | Facility: CLINIC | Age: 83
End: 2018-08-13

## 2018-08-13 NOTE — TELEPHONE ENCOUNTER
----- Message from Kanu Chandler sent at 8/13/2018  9:05 AM CDT -----  Type: Needs Medical Advice    Who Called:  Wife- Love Knott   Symptoms (please be specific):  NA   How long has patient had these symptoms:  NA  Pharmacy name and phone #:  NA  Best Call Back Number: [phone]  Additional Information: Patient's wife was advised to call the nurse with an update regarding the patient.

## 2018-08-13 NOTE — TELEPHONE ENCOUNTER
Called pt wife Love, she stated she got his med Thursday. He took scheduled dose. Friday night passed more blood in urine. Then cleared up.  Has been clear sense. And has been clear since then. She stated robbi wanted her to call if he passed anymore. She thinks he didn't have enough medication in his system yet. Please advise.

## 2018-08-14 NOTE — TELEPHONE ENCOUNTER
Called pt wife, notified of message per Jessica Mackenzie NP and she verbally understood. Advised Jessica is out sick today and can call pcp if occurs again and she verbally understood.

## 2018-09-14 DIAGNOSIS — I10 ESSENTIAL HYPERTENSION: Chronic | ICD-10-CM

## 2018-09-14 DIAGNOSIS — F03.90 DEMENTIA WITHOUT BEHAVIORAL DISTURBANCE, UNSPECIFIED DEMENTIA TYPE: Chronic | ICD-10-CM

## 2018-09-14 DIAGNOSIS — E78.5 HYPERLIPIDEMIA, UNSPECIFIED HYPERLIPIDEMIA TYPE: ICD-10-CM

## 2018-09-14 DIAGNOSIS — K21.9 GASTROESOPHAGEAL REFLUX DISEASE WITHOUT ESOPHAGITIS: ICD-10-CM

## 2018-09-14 RX ORDER — ATORVASTATIN CALCIUM 40 MG/1
40 TABLET, FILM COATED ORAL DAILY
Qty: 90 TABLET | Refills: 3 | Status: SHIPPED | OUTPATIENT
Start: 2018-09-14 | End: 2019-07-08 | Stop reason: SDUPTHER

## 2018-09-14 RX ORDER — AMLODIPINE BESYLATE 10 MG/1
10 TABLET ORAL DAILY
Qty: 90 TABLET | Refills: 3 | Status: SHIPPED | OUTPATIENT
Start: 2018-09-14 | End: 2019-07-08 | Stop reason: SDUPTHER

## 2018-09-14 RX ORDER — OMEPRAZOLE 20 MG/1
20 CAPSULE, DELAYED RELEASE ORAL DAILY
Qty: 90 CAPSULE | Refills: 3 | Status: SHIPPED | OUTPATIENT
Start: 2018-09-14

## 2018-09-14 RX ORDER — DONEPEZIL HYDROCHLORIDE 5 MG/1
5 TABLET, FILM COATED ORAL NIGHTLY
Qty: 90 TABLET | Refills: 3 | Status: SHIPPED | OUTPATIENT
Start: 2018-09-14 | End: 2019-07-08 | Stop reason: SDUPTHER

## 2018-10-09 ENCOUNTER — LAB VISIT (OUTPATIENT)
Dept: LAB | Facility: HOSPITAL | Age: 83
End: 2018-10-09
Attending: EMERGENCY MEDICINE
Payer: MEDICARE

## 2018-10-09 ENCOUNTER — OFFICE VISIT (OUTPATIENT)
Dept: FAMILY MEDICINE | Facility: CLINIC | Age: 83
End: 2018-10-09
Payer: MEDICARE

## 2018-10-09 VITALS
WEIGHT: 150.13 LBS | OXYGEN SATURATION: 95 % | BODY MASS INDEX: 27.63 KG/M2 | HEART RATE: 72 BPM | SYSTOLIC BLOOD PRESSURE: 110 MMHG | HEIGHT: 62 IN | RESPIRATION RATE: 16 BRPM | DIASTOLIC BLOOD PRESSURE: 60 MMHG

## 2018-10-09 DIAGNOSIS — E11.9 TYPE 2 DIABETES MELLITUS WITHOUT COMPLICATION, WITHOUT LONG-TERM CURRENT USE OF INSULIN: ICD-10-CM

## 2018-10-09 DIAGNOSIS — I10 ESSENTIAL HYPERTENSION: Chronic | ICD-10-CM

## 2018-10-09 DIAGNOSIS — R32 URINARY INCONTINENCE, UNSPECIFIED TYPE: ICD-10-CM

## 2018-10-09 DIAGNOSIS — E11.9 DIABETES MELLITUS WITH COINCIDENT HYPERTENSION: Chronic | ICD-10-CM

## 2018-10-09 DIAGNOSIS — I10 DIABETES MELLITUS WITH COINCIDENT HYPERTENSION: Chronic | ICD-10-CM

## 2018-10-09 DIAGNOSIS — F03.90 DEMENTIA WITHOUT BEHAVIORAL DISTURBANCE, UNSPECIFIED DEMENTIA TYPE: Primary | Chronic | ICD-10-CM

## 2018-10-09 DIAGNOSIS — Z85.46 HISTORY OF PROSTATE CANCER: Chronic | ICD-10-CM

## 2018-10-09 DIAGNOSIS — N30.01 HEMATURIA DUE TO ACUTE CYSTITIS: ICD-10-CM

## 2018-10-09 LAB
ESTIMATED AVG GLUCOSE: 189 MG/DL
HBA1C MFR BLD HPLC: 8.2 %

## 2018-10-09 PROCEDURE — 90662 IIV NO PRSV INCREASED AG IM: CPT | Mod: PBBFAC,PO

## 2018-10-09 PROCEDURE — 36415 COLL VENOUS BLD VENIPUNCTURE: CPT | Mod: PO

## 2018-10-09 PROCEDURE — 1101F PT FALLS ASSESS-DOCD LE1/YR: CPT | Mod: CPTII,,, | Performed by: EMERGENCY MEDICINE

## 2018-10-09 PROCEDURE — 99499 UNLISTED E&M SERVICE: CPT | Mod: HCNC,S$GLB,, | Performed by: EMERGENCY MEDICINE

## 2018-10-09 PROCEDURE — 83036 HEMOGLOBIN GLYCOSYLATED A1C: CPT

## 2018-10-09 PROCEDURE — 99999 PR PBB SHADOW E&M-EST. PATIENT-LVL V: CPT | Mod: PBBFAC,,, | Performed by: EMERGENCY MEDICINE

## 2018-10-09 PROCEDURE — 99214 OFFICE O/P EST MOD 30 MIN: CPT | Mod: S$PBB,,, | Performed by: EMERGENCY MEDICINE

## 2018-10-09 PROCEDURE — 99215 OFFICE O/P EST HI 40 MIN: CPT | Mod: PBBFAC,PO,25 | Performed by: EMERGENCY MEDICINE

## 2018-10-09 NOTE — PROGRESS NOTES
Subjective:   THIS NOTE IS DONE WITH VOICE RECOGNITION        Patient ID: Mayra Knott is a 86 y.o. male.    Chief Complaint: dementia without behavioral disturbance, un specified sandi      HPI     He is in today accompanied by his wife to review the medical conditions.    The primary area of concern continue to be around evolving dementia.  Fortunately, he is not having significant behavioral disturbance with this.  He is sleeping more.  He is able to do most of his adult daily living activities, particularly around bowel and bladder control.  His major issues nighttime incontinence.  He is using barrier methods.  He is not complaining of dysuria.  There has been no noticed hematuria.  At least not in last few weeks.  There has been hematuria in the past.  He does have a history of prostate cancer.    Results for MAYRA KNOTT (MRN [id number]) as of 10/14/2018 10:27   Ref. Range 1/2/2014 07:59 2/6/2015 10:03 2/23/2016 10:18 4/18/2017 09:37 4/5/2018 12:37   PSA DIAGNOSTIC Latest Ref Range: 0.00 - 4.00 ng/mL   <0.01 <0.01 <0.01     Here to follow up on blood pressure.  Taking current medications.  With home monitoring, he will occasionally have a low blood pressure, nothing significant.  No significant hypertension.  His wife is being very careful with the amount of salt that she makes available.    BP Readings from Last 3 Encounters:   10/09/18 110/60   08/06/18 (!) 140/62   04/05/18 102/60       He is not experiencing any major issues with diabetes.  He is currently using a glimeiride 3 milligrams a day and split dosage.  He did have significant diarrhea with metformin.     Results for MAYRA KNOTT (MRN [id number]) as of 10/14/2018 10:27   Ref. Range 3/27/2017 12:42 8/17/2017 11:43 12/5/2017 12:17 4/5/2018 12:36 10/9/2018 15:40   Hemoglobin A1C Latest Ref Range: 4.0 - 5.6 % 6.9 (H) 6.8 (H) 7.9 (H) 8.2 (H) 8.2 (H)   Estimated Avg Glucose Latest Ref Range: 68 - 131 mg/dL 151 (H) 148 (H) 180 (H) 189 (H) 189 (H)      Hyperlipidemia monitoring.    Lab Results   Component Value Date    CHOL 168 04/05/2018    CHOL 183 04/18/2017    CHOL 161 05/24/2016     Lab Results   Component Value Date    HDL 35 (L) 04/05/2018    HDL 43 04/18/2017    HDL 37 (L) 05/24/2016     Lab Results   Component Value Date    LDLCALC 102.8 04/05/2018    LDLCALC 108.0 04/18/2017    LDLCALC 92.2 05/24/2016     Lab Results   Component Value Date    TRIG 151 (H) 04/05/2018    TRIG 160 (H) 04/18/2017    TRIG 159 (H) 05/24/2016     Lab Results   Component Value Date    CHOLHDL 20.8 04/05/2018    CHOLHDL 23.5 04/18/2017    CHOLHDL 23.0 05/24/2016       Current Outpatient Medications   Medication Sig Dispense Refill    acetaminophen (TYLENOL) 325 MG tablet Take 325 mg by mouth every 6 (six) hours as needed for Pain.      amLODIPine (NORVASC) 10 MG tablet Take 1 tablet (10 mg total) by mouth once daily. 90 tablet 3    aspirin 81 mg Tab 1 Tablet(s) Oral PRN Every day.        atorvastatin (LIPITOR) 40 MG tablet Take 1 tablet (40 mg total) by mouth once daily. 90 tablet 3    blood sugar diagnostic Strp use  test strips and lancets covered by insurance  two times a day. DX: E11.9 200 strip 3    diabetic supplies, miscellan. Kit 1 Device by Misc.(Non-Drug; Combo Route) route once daily. Dx E11.9  Use diabetic test kit covered by insurance. Use two times a day. 1 kit 1    donepezil (ARICEPT) 5 MG tablet Take 1 tablet (5 mg total) by mouth every evening. 90 tablet 3    fluocinonide (LIDEX) 0.05 % ointment Apply topically 2 (two) times daily. 60 g 1    fluticasone (FLONASE) 50 mcg/actuation nasal spray 2 sprays by Each Nare route once daily. 16 g 11    glimepiride (AMARYL) 1 MG tablet Two PO in am, one PO in the evening. (Patient taking differently: Take 3 mg by mouth daily with breakfast. Three in the AM) 270 tablet 1    lisinopril (PRINIVIL,ZESTRIL) 5 MG tablet Take 1 tablet (5 mg total) by mouth once daily. 90 tablet 3    loperamide (IMODIUM) 2 mg capsule  Take 2 mg by mouth 4 (four) times daily as needed for Diarrhea.      MV, MIN #36/IRON,CARBONYL/FA (GERITOL COMPLETE ORAL) Take by mouth.      omeprazole (PRILOSEC) 20 MG capsule Take 1 capsule (20 mg total) by mouth once daily. 90 capsule 3     No current facility-administered medications for this visit.          Review of Systems   Unable to perform ROS: Dementia       Objective:      Physical Exam   Constitutional: He appears well-developed and well-nourished. No distress.   HENT:   Head: Normocephalic and atraumatic.   Right Ear: External ear normal.   Left Ear: External ear normal.   Nose: Nose normal.   Mouth/Throat: Oropharynx is clear and moist. No oropharyngeal exudate.   Eyes: Conjunctivae and EOM are normal. Pupils are equal, round, and reactive to light. No scleral icterus.   Neck: Normal range of motion. Neck supple. No thyromegaly present.   Cardiovascular: Normal rate, regular rhythm and normal heart sounds.   Pulses:       Dorsalis pedis pulses are 2+ on the right side, and 2+ on the left side.        Posterior tibial pulses are 2+ on the right side, and 2+ on the left side.   Pulmonary/Chest: Effort normal and breath sounds normal. He has no wheezes. He has no rales.   Abdominal: Soft. Bowel sounds are normal. He exhibits no distension. There is no tenderness.   Musculoskeletal: Normal range of motion. He exhibits no edema or tenderness.        Right foot: There is no deformity.        Left foot: There is no deformity.   Feet:   Right Foot:   Protective Sensation: 5 sites tested. 5 sites sensed.   Skin Integrity: Negative for skin breakdown.   Left Foot:   Protective Sensation: 5 sites tested. 5 sites sensed.   Skin Integrity: Negative for skin breakdown.   Lymphadenopathy:     He has no cervical adenopathy.   Neurological: He is alert. He has normal strength and normal reflexes. No cranial nerve deficit or sensory deficit. He exhibits normal muscle tone. He displays no seizure activity.  Coordination and gait normal.   Reflex Scores:       Tricep reflexes are 2+ on the right side and 2+ on the left side.       Bicep reflexes are 2+ on the right side and 2+ on the left side.       Brachioradialis reflexes are 2+ on the right side and 2+ on the left side.       Patellar reflexes are 2+ on the right side and 2+ on the left side.       Achilles reflexes are 2+ on the right side and 2+ on the left side.  He is oriented to person.  He is not oriented to time or date.  He is aware ease at the doctor's office, but not able to tell me exactly where this is.    His short-term memory continues to be compromised.  His wife does all of the talking for him.  He is no longer driving.   Skin: Skin is warm and dry. No rash noted.   Psychiatric: He has a normal mood and affect. His behavior is normal.   Vitals reviewed.      Assessment:       1. Dementia without behavioral disturbance, unspecified dementia type    2. Diabetes mellitus with coincident hypertension    3. Essential hypertension    4. History of prostate cancer    5. Hematuria due to acute cystitis    6. Urinary incontinence, unspecified type        Plan:       1. Dementia without behavioral disturbance, unspecified dementia type  No changes  Agree with no driving  Long term planning discussed.  Advanced directive encouraged.    2. Diabetes mellitus with coincident hypertension  Not at target  Will increase glimepiride to 4 milligrams a day.  Repeat hemoglobin A1c in 3 months.  Cautioned about hypoglycemia.    - Hemoglobin A1c; Future    3. Essential hypertension  Controlled.  Continue current medications  Continue to avoid excessive sodium  Continue home monitoring  Target blood pressure is 139/89 or less    4. History of prostate cancer  Stable  Annual diagnostic PSA recommended    5. Hematuria due to acute cystitis  Check urinalysis  The hematuria that he had was several weeks ago.    - Urinalysis; Future    6. Urinary incontinence, unspecified  type  Moderately problematic  Able to control issues with barrier methods in the evenings.  No anticipation of adding medications at this time.

## 2018-10-09 NOTE — Clinical Note
After reviewing his hemoglobin A1c, I have increased his glimepiride to 4 milligrams a day.  He can take all 4 tablets (1 milligram) at once.  His new prescription will be for 4 milligrams and will be 1 tablet a day.  This has been sent to [facility] pharmacy.Please communicate this change to his wife.Nixon Caldera MD

## 2018-10-14 RX ORDER — GLIMEPIRIDE 4 MG/1
4 TABLET ORAL
Qty: 90 TABLET | Refills: 1 | Status: SHIPPED | OUTPATIENT
Start: 2018-10-14 | End: 2019-01-26

## 2019-01-21 ENCOUNTER — OFFICE VISIT (OUTPATIENT)
Dept: FAMILY MEDICINE | Facility: CLINIC | Age: 84
End: 2019-01-21
Payer: MEDICARE

## 2019-01-21 ENCOUNTER — LAB VISIT (OUTPATIENT)
Dept: LAB | Facility: HOSPITAL | Age: 84
End: 2019-01-21
Attending: EMERGENCY MEDICINE
Payer: MEDICARE

## 2019-01-21 DIAGNOSIS — I10 DIABETES MELLITUS WITH COINCIDENT HYPERTENSION: Chronic | ICD-10-CM

## 2019-01-21 DIAGNOSIS — E11.9 DIABETES MELLITUS WITH COINCIDENT HYPERTENSION: Chronic | ICD-10-CM

## 2019-01-21 DIAGNOSIS — E78.5 HYPERLIPIDEMIA, UNSPECIFIED HYPERLIPIDEMIA TYPE: Chronic | ICD-10-CM

## 2019-01-21 DIAGNOSIS — Z85.46 HISTORY OF PROSTATE CANCER: Chronic | ICD-10-CM

## 2019-01-21 DIAGNOSIS — L60.2 NAIL HYPERTROPHY: ICD-10-CM

## 2019-01-21 DIAGNOSIS — I10 ESSENTIAL HYPERTENSION: Chronic | ICD-10-CM

## 2019-01-21 DIAGNOSIS — F03.90 DEMENTIA WITHOUT BEHAVIORAL DISTURBANCE, UNSPECIFIED DEMENTIA TYPE: Primary | Chronic | ICD-10-CM

## 2019-01-21 DIAGNOSIS — R32 URINARY INCONTINENCE, UNSPECIFIED TYPE: ICD-10-CM

## 2019-01-21 DIAGNOSIS — E11.9 TYPE 2 DIABETES MELLITUS WITHOUT COMPLICATION, WITHOUT LONG-TERM CURRENT USE OF INSULIN: ICD-10-CM

## 2019-01-21 LAB
ALBUMIN SERPL BCP-MCNC: 4.1 G/DL
ALP SERPL-CCNC: 72 U/L
ALT SERPL W/O P-5'-P-CCNC: 58 U/L
ANION GAP SERPL CALC-SCNC: 11 MMOL/L
AST SERPL-CCNC: 42 U/L
BILIRUB SERPL-MCNC: 0.9 MG/DL
BUN SERPL-MCNC: 18 MG/DL
CALCIUM SERPL-MCNC: 9.6 MG/DL
CHLORIDE SERPL-SCNC: 104 MMOL/L
CHOLEST SERPL-MCNC: 156 MG/DL
CHOLEST/HDLC SERPL: 4.2 {RATIO}
CO2 SERPL-SCNC: 25 MMOL/L
CREAT SERPL-MCNC: 0.8 MG/DL
EST. GFR  (AFRICAN AMERICAN): >60 ML/MIN/1.73 M^2
EST. GFR  (NON AFRICAN AMERICAN): >60 ML/MIN/1.73 M^2
ESTIMATED AVG GLUCOSE: 203 MG/DL
GLUCOSE SERPL-MCNC: 143 MG/DL
HBA1C MFR BLD HPLC: 8.7 %
HDLC SERPL-MCNC: 37 MG/DL
HDLC SERPL: 23.7 %
LDLC SERPL CALC-MCNC: 92.8 MG/DL
NONHDLC SERPL-MCNC: 119 MG/DL
POTASSIUM SERPL-SCNC: 3.8 MMOL/L
PROT SERPL-MCNC: 7.6 G/DL
SODIUM SERPL-SCNC: 140 MMOL/L
TRIGL SERPL-MCNC: 131 MG/DL

## 2019-01-21 PROCEDURE — 99999 PR PBB SHADOW E&M-EST. PATIENT-LVL V: ICD-10-PCS | Mod: PBBFAC,HCNC,, | Performed by: EMERGENCY MEDICINE

## 2019-01-21 PROCEDURE — 99214 OFFICE O/P EST MOD 30 MIN: CPT | Mod: HCNC,S$GLB,, | Performed by: EMERGENCY MEDICINE

## 2019-01-21 PROCEDURE — 80053 COMPREHEN METABOLIC PANEL: CPT

## 2019-01-21 PROCEDURE — 36415 COLL VENOUS BLD VENIPUNCTURE: CPT | Mod: PO

## 2019-01-21 PROCEDURE — 99999 PR PBB SHADOW E&M-EST. PATIENT-LVL V: CPT | Mod: PBBFAC,HCNC,, | Performed by: EMERGENCY MEDICINE

## 2019-01-21 PROCEDURE — 80061 LIPID PANEL: CPT

## 2019-01-21 PROCEDURE — 1101F PT FALLS ASSESS-DOCD LE1/YR: CPT | Mod: HCNC,CPTII,S$GLB, | Performed by: EMERGENCY MEDICINE

## 2019-01-21 PROCEDURE — 83036 HEMOGLOBIN GLYCOSYLATED A1C: CPT

## 2019-01-21 PROCEDURE — 1101F PR PT FALLS ASSESS DOC 0-1 FALLS W/OUT INJ PAST YR: ICD-10-PCS | Mod: HCNC,CPTII,S$GLB, | Performed by: EMERGENCY MEDICINE

## 2019-01-21 PROCEDURE — 99214 PR OFFICE/OUTPT VISIT, EST, LEVL IV, 30-39 MIN: ICD-10-PCS | Mod: HCNC,S$GLB,, | Performed by: EMERGENCY MEDICINE

## 2019-01-21 NOTE — PROGRESS NOTES
Subjective:   THIS NOTE IS DONE WITH VOICE RECOGNITION        Patient ID: Mayra Knott is a 86 y.o. male.    Chief Complaint: Hyperlipidemia      HPI     Here to follow up on blood pressure.  Taking current medications.    BP Readings from Last 3 Encounters:   01/21/19 139/64   10/09/18 110/60   08/06/18 (!) 140/62     Hyperlipidemia monitoring.  Atorvastatin 40 milligrams daily.    Lab Results   Component Value Date    CHOL 156 01/21/2019    CHOL 168 04/05/2018    CHOL 183 04/18/2017     Lab Results   Component Value Date    HDL 35 (L) 04/05/2018    HDL 43 04/18/2017    HDL 37 (L) 05/24/2016     Lab Results   Component Value Date    LDLCALC 102.8 04/05/2018    LDLCALC 108.0 04/18/2017    LDLCALC 92.2 05/24/2016     Lab Results   Component Value Date    TRIG 151 (H) 04/05/2018    TRIG 160 (H) 04/18/2017    TRIG 159 (H) 05/24/2016     Lab Results   Component Value Date    CHOLHDL 20.8 04/05/2018    CHOLHDL 23.5 04/18/2017    CHOLHDL 23.0 05/24/2016     His dementia is gradually increasing.  He is no longer able to drive.  He is doing very little around the house.  His activity levels her limited to watching television occasionally having on the kitchen with direction.  He is able to dress himself.  He is having some issues with incontinence, for which he is using barrier underwear.  This has made things much easier.  Less issue with fecal incontinence.    There have been no changes with his diabetes management.  His diet consists predominantly of carbohydrates.  His weight has been relatively stable in is currently 148.8 pounds.    Results for MAYRA KNOTT (MRN [id number]) as of 1/20/2019 21:27   Ref. Range 3/27/2017 12:42 8/17/2017 11:43 12/5/2017 12:17 4/5/2018 12:36 10/9/2018 15:40   Hemoglobin A1C External Latest Ref Range: 4.0 - 5.6 % 6.9 (H) 6.8 (H) 7.9 (H) 8.2 (H) 8.2 (H)   Estimated Avg Glucose Latest Ref Range: 68 - 131 mg/dL 151 (H) 148 (H) 180 (H) 189 (H) 189 (H)     Lab Results   Component Value Date     HGBA1C 8.7 (H) 01/21/2019     Creatinine   Date Value Ref Range Status   01/21/2019 0.8 0.5 - 1.4 mg/dL Final   04/05/2018 0.9 0.5 - 1.4 mg/dL Final   04/18/2017 0.7 0.5 - 1.4 mg/dL Final       History of prostate cancer.    Results for MAYRA VINCENT (MRN [id number]) as of 1/20/2019 21:27   Ref. Range 1/2/2014 07:59 2/6/2015 10:03 2/23/2016 10:18 4/18/2017 09:37 4/5/2018 12:37   PSA DIAGNOSTIC Latest Ref Range: 0.00 - 4.00 ng/mL   <0.01 <0.01 <0.01     No change in social history, surgical history, medical history, or family history.    Immunization History   Administered Date(s) Administered    Influenza - High Dose 12/14/2012, 09/24/2013, 10/03/2014, 10/27/2015, 11/22/2016, 10/19/2017, 10/09/2018    Influenza A (H1N1) 2009 Monovalent - IM 02/02/2010    Influenza Split 11/19/2009, 10/26/2010, 09/17/2011    Pneumococcal Conjugate 06/24/2004    Pneumococcal Conjugate - 13 Valent 06/24/2004, 02/24/2015    Pneumococcal Polysaccharide - 23 Valent 04/27/2016     Shingrix (recombinant shingles vaccine) has been discussed and recommended.    Current Outpatient Medications   Medication Sig Dispense Refill    acetaminophen (TYLENOL) 325 MG tablet Take 325 mg by mouth every 6 (six) hours as needed for Pain.      amLODIPine (NORVASC) 10 MG tablet Take 1 tablet (10 mg total) by mouth once daily. 90 tablet 3    aspirin 81 mg Tab 1 Tablet(s) Oral PRN Every day.        atorvastatin (LIPITOR) 40 MG tablet Take 1 tablet (40 mg total) by mouth once daily. 90 tablet 3    blood sugar diagnostic Strp use  test strips and lancets covered by insurance  two times a day. DX: E11.9 200 strip 3    diabetic supplies, miscellan. Kit 1 Device by Misc.(Non-Drug; Combo Route) route once daily. Dx E11.9  Use diabetic test kit covered by insurance. Use two times a day. 1 kit 1    donepezil (ARICEPT) 5 MG tablet Take 1 tablet (5 mg total) by mouth every evening. 90 tablet 3    fluocinonide (LIDEX) 0.05 % ointment Apply topically 2 (two)  times daily. 60 g 1    fluticasone (FLONASE) 50 mcg/actuation nasal spray 2 sprays by Each Nare route once daily. 16 g 11    glimepiride (AMARYL) 4 MG tablet Take 1 tablet (4 mg total) by mouth daily with breakfast. 90 tablet 1    lisinopril (PRINIVIL,ZESTRIL) 5 MG tablet Take 1 tablet (5 mg total) by mouth once daily. 90 tablet 3    loperamide (IMODIUM) 2 mg capsule Take 2 mg by mouth 4 (four) times daily as needed for Diarrhea.      MV, MIN #36/IRON,CARBONYL/FA (GERITOL COMPLETE ORAL) Take by mouth.      omeprazole (PRILOSEC) 20 MG capsule Take 1 capsule (20 mg total) by mouth once daily. 90 capsule 3     No current facility-administered medications for this visit.          Review of Systems   Unable to perform ROS: Dementia (wife provides ROS)   Constitutional: Positive for fatigue. Negative for activity change, chills, fever and unexpected weight change.   HENT: Negative for hearing loss, nosebleeds and tinnitus.    Eyes: Negative for discharge and itching.   Respiratory: Negative for cough, choking, chest tightness, shortness of breath and wheezing.    Cardiovascular: Negative for chest pain, palpitations and leg swelling.   Gastrointestinal: Negative for abdominal distention.   Genitourinary: Negative for difficulty urinating, dysuria, flank pain, hematuria and urgency.        Urinary incontinence     Musculoskeletal: Negative for arthralgias, back pain and joint swelling.   Neurological: Negative for dizziness, tremors, seizures, numbness and headaches.   Psychiatric/Behavioral: Positive for confusion and decreased concentration. Negative for dysphoric mood and hallucinations.        Increase sleep.       Objective:      Physical Exam   Constitutional: He is oriented to person, place, and time. He appears well-developed and well-nourished. No distress.   HENT:   Head: Normocephalic and atraumatic.   Right Ear: External ear normal.   Left Ear: External ear normal.   Nose: Nose normal.   Mouth/Throat:  Oropharynx is clear and moist. No oropharyngeal exudate.   Eyes: Conjunctivae and EOM are normal. Pupils are equal, round, and reactive to light. No scleral icterus.   Neck: Normal range of motion. Neck supple. No thyromegaly present.   Cardiovascular: Normal rate, regular rhythm and normal heart sounds.   Pulmonary/Chest: Effort normal and breath sounds normal. He has no wheezes. He has no rales.   Abdominal: Soft. Bowel sounds are normal. He exhibits no distension. There is no tenderness.   Musculoskeletal: Normal range of motion. He exhibits no edema or tenderness.   Lymphadenopathy:     He has no cervical adenopathy.   Neurological: He is alert and oriented to person, place, and time. He has normal reflexes. He exhibits normal muscle tone. Coordination normal.   Skin: Skin is warm and dry. No rash noted.   Nails long.  He is not able to cut his own nails.  His wife is not able to cut his nails.   Psychiatric: He has a normal mood and affect. His behavior is normal.   Vitals reviewed.      Assessment:       1. Dementia without behavioral disturbance, unspecified dementia type    2. Diabetes mellitus with coincident hypertension    3. Essential hypertension    4. Hyperlipidemia, unspecified hyperlipidemia type    5. History of prostate cancer    6. Urinary incontinence, unspecified type    7. Nail hypertrophy    8. Type 2 diabetes mellitus without complication, without long-term current use of insulin        Plan:       1. Dementia without behavioral disturbance, unspecified dementia type  Acceptable control from his wife's perspective  Anticipate continued decline  No behavioral issues.    2. Diabetes mellitus with coincident hypertension  Hypertension at target  Diabetes not as well controlled as ideally  He is not able to tolerate metformin secondary to diarrhea  His wife would like to avoid all injectable medications.  Will see if they are able to give him 6 milligrams of glimepiride by breaking the 4  milligram tablet in half and taking 1.5 tablets.  This would be the 4 milligram tablet.    3. Essential hypertension  Controlled.  Continue current medications  Continue to avoid excessive sodium  Continue home monitoring  Target blood pressure is 139/89 or less    - lisinopril 10 MG Tab; Take 1 tablet (10 mg total) by mouth once daily.  Dispense: 90 tablet; Refill: 3    4. Hyperlipidemia, unspecified hyperlipidemia type  Lipids controlled  Continue current medications  Annual lipid profile and comprehensive metabolic panel  If complications developed such as myalgia or arthralgia, contact me.    5. History of prostate cancer  Continue to monitor diagnostic PSAs annually  - PROSTATE SPECIFIC ANTIGEN, DIAGNOSTIC; Future    6. Urinary incontinence, unspecified type  Secondary to his prostate issues and age/dementia  Have recommended barrier methods continue.    7. Nail hypertrophy  Podiatric referral  The patient is unable to reach his feet and is unsafe to do so.  His wife does not have hand strength to do his nail care.    8. Type 2 diabetes mellitus without complication, without long-term current use of insulin  See above comments.    - lisinopril 10 MG Tab; Take 1 tablet (10 mg total) by mouth once daily.  Dispense: 90 tablet; Refill: 3    LETTER requested.

## 2019-01-21 NOTE — PATIENT INSTRUCTIONS
Love,    I have increased his blood pressure medication (LISINOPRIL) to 10 mg a day    We will check the blood work today.    No driving for Relious.    Nixon Caldera MD

## 2019-01-26 VITALS
WEIGHT: 148.81 LBS | HEIGHT: 62 IN | OXYGEN SATURATION: 94 % | RESPIRATION RATE: 17 BRPM | DIASTOLIC BLOOD PRESSURE: 64 MMHG | BODY MASS INDEX: 27.38 KG/M2 | HEART RATE: 52 BPM | SYSTOLIC BLOOD PRESSURE: 139 MMHG

## 2019-01-26 DIAGNOSIS — E11.9 TYPE 2 DIABETES MELLITUS WITHOUT COMPLICATION, WITHOUT LONG-TERM CURRENT USE OF INSULIN: ICD-10-CM

## 2019-01-26 RX ORDER — GLIMEPIRIDE 4 MG/1
6 TABLET ORAL
Qty: 135 TABLET | Refills: 1
Start: 2019-01-26 | End: 2019-02-13 | Stop reason: SDUPTHER

## 2019-02-13 ENCOUNTER — OFFICE VISIT (OUTPATIENT)
Dept: FAMILY MEDICINE | Facility: CLINIC | Age: 84
End: 2019-02-13
Payer: MEDICARE

## 2019-02-13 VITALS
BODY MASS INDEX: 27.22 KG/M2 | HEART RATE: 64 BPM | WEIGHT: 147.94 LBS | HEIGHT: 62 IN | DIASTOLIC BLOOD PRESSURE: 71 MMHG | SYSTOLIC BLOOD PRESSURE: 159 MMHG

## 2019-02-13 DIAGNOSIS — I10 DIABETES MELLITUS WITH COINCIDENT HYPERTENSION: Chronic | ICD-10-CM

## 2019-02-13 DIAGNOSIS — Z00.00 ENCOUNTER FOR PREVENTIVE HEALTH EXAMINATION: Primary | ICD-10-CM

## 2019-02-13 DIAGNOSIS — E11.9 TYPE 2 DIABETES MELLITUS WITHOUT COMPLICATION, WITHOUT LONG-TERM CURRENT USE OF INSULIN: ICD-10-CM

## 2019-02-13 DIAGNOSIS — E78.5 HYPERLIPIDEMIA, UNSPECIFIED HYPERLIPIDEMIA TYPE: Chronic | ICD-10-CM

## 2019-02-13 DIAGNOSIS — E11.9 DIABETES MELLITUS WITH COINCIDENT HYPERTENSION: Chronic | ICD-10-CM

## 2019-02-13 DIAGNOSIS — I10 ESSENTIAL HYPERTENSION: Chronic | ICD-10-CM

## 2019-02-13 DIAGNOSIS — F03.90 DEMENTIA WITHOUT BEHAVIORAL DISTURBANCE, UNSPECIFIED DEMENTIA TYPE: Chronic | ICD-10-CM

## 2019-02-13 PROCEDURE — 99999 PR PBB SHADOW E&M-EST. PATIENT-LVL IV: CPT | Mod: PBBFAC,HCNC,, | Performed by: NURSE PRACTITIONER

## 2019-02-13 PROCEDURE — G0439 PPPS, SUBSEQ VISIT: HCPCS | Mod: HCNC,S$GLB,, | Performed by: NURSE PRACTITIONER

## 2019-02-13 PROCEDURE — 99999 PR PBB SHADOW E&M-EST. PATIENT-LVL IV: ICD-10-PCS | Mod: PBBFAC,HCNC,, | Performed by: NURSE PRACTITIONER

## 2019-02-13 PROCEDURE — G0439 PR MEDICARE ANNUAL WELLNESS SUBSEQUENT VISIT: ICD-10-PCS | Mod: HCNC,S$GLB,, | Performed by: NURSE PRACTITIONER

## 2019-02-13 RX ORDER — GLIMEPIRIDE 4 MG/1
6 TABLET ORAL
Qty: 135 TABLET | Refills: 1 | Status: SHIPPED | OUTPATIENT
Start: 2019-02-13 | End: 2019-07-08 | Stop reason: SDUPTHER

## 2019-02-13 NOTE — PATIENT INSTRUCTIONS
Counseling and Referral of Other Preventative  (Italic type indicates deductible and co-insurance are waived)    Patient Name: Darnell Knott  Today's Date: 2/13/2019    Health Maintenance       Date Due Completion Date    TETANUS VACCINE 09/23/1950 ---    Eye Exam 03/15/2019 3/15/2018    Hemoglobin A1c 07/21/2019 1/21/2019    Foot Exam 10/09/2019 10/9/2018    Lipid Panel 01/21/2020 1/21/2019        No orders of the defined types were placed in this encounter.    The following information is provided to all patients.  This information is to help you find resources for any of the problems found today that may be affecting your health:                Living healthy guide: www.[facility].[place].gov      Understanding Diabetes: www.diabetes.org      Eating healthy: www.cdc.gov/healthyweight      CDC home safety checklist: www.cdc.gov/steadi/patient.html      Agency on Aging: www.goea.[place].[facility]      Alcoholics anonymous (AA): www.aa.org      Physical Activity: www.aj.nih.gov/ss8vmkt      Tobacco use: www.quitwithusla.org

## 2019-02-13 NOTE — PROGRESS NOTES
"Darnell Knott presented for a  Medicare AWV and comprehensive Health Risk Assessment today. The following components were reviewed and updated:    · Medical history  · Family History  · Social history  · Allergies and Current Medications  · Health Risk Assessment  · Health Maintenance  · Care Team     ** See Completed Assessments for Annual Wellness Visit within the encounter summary.**     The following assessments were completed:  · Living Situation  · CAGE  · Depression Screening  · Timed Get Up and Go  · Whisper Test  · Cognitive Function Screening  DEMENTIA- currently on Aricept  · Nutrition Screening  · ADL Screening  · PAQ Screening    Vitals:    02/13/19 1000   BP: (!) 159/71   BP Location: Left arm   Patient Position: Sitting   BP Method: Medium (Automatic)   Pulse: 64   Weight: 67.1 kg (147 lb 14.9 oz)   Height: 5' 2" (1.575 m)     Body mass index is 27.06 kg/m².  Physical Exam   Constitutional: He is oriented to person, place, and time. He appears well-nourished.   Cardiovascular: Normal rate, regular rhythm, normal heart sounds and intact distal pulses.   Pulmonary/Chest: Effort normal and breath sounds normal.   Neurological: He is alert and oriented to person, place, and time.   Skin: Skin is warm and dry.   Vitals reviewed.        Diagnoses and health risks identified today and associated recommendations/orders:    1. Encounter for preventive health examination  Reviewed and discussed health maintenance.      2. Dementia without behavioral disturbance, unspecified dementia type  Stable- continue current treatment and follow up routinely with PCP     3. Diabetes mellitus with coincident hypertension  Uncontrolled. Discussed diabetic diet. Seems to need extra education on diet. Offered diabetic educator appt. But wife declined.   Continue with increased dose of Glimepiride and Follow up with PCP as planned    4. Hyperlipidemia, unspecified hyperlipidemia type  Stable- continue current treatment and " follow up routinely with PCP     5. Essential hypertension   Elevated but patients wife states its better at home. Encouraged her to continue to monitor home readings and if blood pressure is consistently over 140/90, notify clinic. Wife verbalized an understanding  Follow up with PCP as planned    6. Type 2 diabetes mellitus without complication, without long-term current use of insulin  Uncontrolled. Discussed diabetic diet. Seems to need extra education on diet. Offered diabetic educator appt. But wife declined.   Continue with increased dose of Glimepiride and Follow up with PCP as planned  - glimepiride (AMARYL) 4 MG tablet; Take 1.5 tablets (6 mg total) by mouth daily with breakfast.  Dispense: 135 tablet; Refill: 1    Provided Relious with a 5-10 year written screening schedule and personal prevention plan. Recommendations were developed using the USPSTF age appropriate recommendations. Education, counseling, and referrals were provided as needed. After Visit Summary printed and given to patient which includes a list of additional screenings\tests needed.    Patricia Claire NP

## 2019-05-21 ENCOUNTER — HOSPITAL ENCOUNTER (OUTPATIENT)
Dept: RADIOLOGY | Facility: HOSPITAL | Age: 84
Discharge: HOME | End: 2019-05-21
Attending: EMERGENCY MEDICINE
Payer: MEDICARE

## 2019-05-21 ENCOUNTER — OFFICE VISIT (OUTPATIENT)
Dept: FAMILY MEDICINE | Facility: CLINIC | Age: 84
End: 2019-05-21
Payer: MEDICARE

## 2019-05-21 ENCOUNTER — OFFICE VISIT (OUTPATIENT)
Dept: ORTHOPEDICS | Facility: CLINIC | Age: 84
End: 2019-05-21
Payer: MEDICARE

## 2019-05-21 VITALS — WEIGHT: 144.38 LBS | BODY MASS INDEX: 26.57 KG/M2 | HEIGHT: 62 IN

## 2019-05-21 VITALS
SYSTOLIC BLOOD PRESSURE: 120 MMHG | BODY MASS INDEX: 26.41 KG/M2 | WEIGHT: 144.38 LBS | RESPIRATION RATE: 16 BRPM | DIASTOLIC BLOOD PRESSURE: 70 MMHG | HEART RATE: 78 BPM

## 2019-05-21 DIAGNOSIS — S52.131A CLOSED DISPLACED FRACTURE OF NECK OF RIGHT RADIUS, INITIAL ENCOUNTER: Primary | ICD-10-CM

## 2019-05-21 DIAGNOSIS — M25.511 ACUTE PAIN OF RIGHT SHOULDER: Primary | ICD-10-CM

## 2019-05-21 DIAGNOSIS — S52.121A CLOSED DISPLACED FRACTURE OF HEAD OF RIGHT RADIUS, INITIAL ENCOUNTER: ICD-10-CM

## 2019-05-21 DIAGNOSIS — E11.9 DIABETES MELLITUS WITH COINCIDENT HYPERTENSION: Chronic | ICD-10-CM

## 2019-05-21 DIAGNOSIS — I10 DIABETES MELLITUS WITH COINCIDENT HYPERTENSION: Chronic | ICD-10-CM

## 2019-05-21 PROCEDURE — 73080 XR ELBOW COMPLETE 3 VIEW RIGHT: ICD-10-PCS | Mod: 26,HCNC,RT, | Performed by: RADIOLOGY

## 2019-05-21 PROCEDURE — 99999 PR PBB SHADOW E&M-EST. PATIENT-LVL IV: CPT | Mod: PBBFAC,HCNC,, | Performed by: EMERGENCY MEDICINE

## 2019-05-21 PROCEDURE — 99499 RISK ADDL DX/OHS AUDIT: ICD-10-PCS | Mod: HCNC,S$GLB,, | Performed by: EMERGENCY MEDICINE

## 2019-05-21 PROCEDURE — 99204 PR OFFICE/OUTPT VISIT, NEW, LEVL IV, 45-59 MIN: ICD-10-PCS | Mod: HCNC,57,S$GLB, | Performed by: ORTHOPAEDIC SURGERY

## 2019-05-21 PROCEDURE — 99214 OFFICE O/P EST MOD 30 MIN: CPT | Mod: HCNC,S$GLB,, | Performed by: EMERGENCY MEDICINE

## 2019-05-21 PROCEDURE — 99204 OFFICE O/P NEW MOD 45 MIN: CPT | Mod: HCNC,57,S$GLB, | Performed by: ORTHOPAEDIC SURGERY

## 2019-05-21 PROCEDURE — 99499 UNLISTED E&M SERVICE: CPT | Mod: HCNC,S$GLB,, | Performed by: EMERGENCY MEDICINE

## 2019-05-21 PROCEDURE — 73030 X-RAY EXAM OF SHOULDER: CPT | Mod: 26,HCNC,RT, | Performed by: RADIOLOGY

## 2019-05-21 PROCEDURE — 73030 X-RAY EXAM OF SHOULDER: CPT | Mod: TC,HCNC,FY,PO,RT

## 2019-05-21 PROCEDURE — 73030 XR SHOULDER TRAUMA 3 VIEW RIGHT: ICD-10-PCS | Mod: 26,HCNC,RT, | Performed by: RADIOLOGY

## 2019-05-21 PROCEDURE — 24650 PR CLOSED RX RADIAL HEAD/NECK FX: ICD-10-PCS | Mod: HCNC,RT,S$GLB, | Performed by: ORTHOPAEDIC SURGERY

## 2019-05-21 PROCEDURE — 1101F PT FALLS ASSESS-DOCD LE1/YR: CPT | Mod: HCNC,CPTII,S$GLB, | Performed by: ORTHOPAEDIC SURGERY

## 2019-05-21 PROCEDURE — 73080 X-RAY EXAM OF ELBOW: CPT | Mod: 26,HCNC,RT, | Performed by: RADIOLOGY

## 2019-05-21 PROCEDURE — 1101F PR PT FALLS ASSESS DOC 0-1 FALLS W/OUT INJ PAST YR: ICD-10-PCS | Mod: HCNC,CPTII,S$GLB, | Performed by: ORTHOPAEDIC SURGERY

## 2019-05-21 PROCEDURE — 24650 CLTX RDL HEAD/NCK FX WO MNPJ: CPT | Mod: HCNC,RT,S$GLB, | Performed by: ORTHOPAEDIC SURGERY

## 2019-05-21 PROCEDURE — 99999 PR PBB SHADOW E&M-EST. PATIENT-LVL IV: ICD-10-PCS | Mod: PBBFAC,HCNC,, | Performed by: EMERGENCY MEDICINE

## 2019-05-21 PROCEDURE — 99214 PR OFFICE/OUTPT VISIT, EST, LEVL IV, 30-39 MIN: ICD-10-PCS | Mod: HCNC,S$GLB,, | Performed by: EMERGENCY MEDICINE

## 2019-05-21 PROCEDURE — 1101F PR PT FALLS ASSESS DOC 0-1 FALLS W/OUT INJ PAST YR: ICD-10-PCS | Mod: HCNC,CPTII,S$GLB, | Performed by: EMERGENCY MEDICINE

## 2019-05-21 PROCEDURE — 99999 PR PBB SHADOW E&M-EST. PATIENT-LVL II: CPT | Mod: PBBFAC,HCNC,, | Performed by: ORTHOPAEDIC SURGERY

## 2019-05-21 PROCEDURE — 1101F PT FALLS ASSESS-DOCD LE1/YR: CPT | Mod: HCNC,CPTII,S$GLB, | Performed by: EMERGENCY MEDICINE

## 2019-05-21 PROCEDURE — 99999 PR PBB SHADOW E&M-EST. PATIENT-LVL II: ICD-10-PCS | Mod: PBBFAC,HCNC,, | Performed by: ORTHOPAEDIC SURGERY

## 2019-05-21 PROCEDURE — 73080 X-RAY EXAM OF ELBOW: CPT | Mod: TC,HCNC,FY,PO,RT

## 2019-05-21 RX ORDER — HYDROCODONE BITARTRATE AND ACETAMINOPHEN 10; 325 MG/1; MG/1
1 TABLET ORAL EVERY 6 HOURS PRN
Qty: 44 TABLET | Refills: 0 | Status: SHIPPED | OUTPATIENT
Start: 2019-05-21 | End: 2019-05-21 | Stop reason: CLARIF

## 2019-05-21 RX ORDER — HYDROCODONE BITARTRATE AND ACETAMINOPHEN 10; 325 MG/1; MG/1
1 TABLET ORAL EVERY 6 HOURS PRN
Qty: 44 TABLET | Refills: 0 | Status: SHIPPED | OUTPATIENT
Start: 2019-05-21 | End: 2019-07-17

## 2019-05-21 NOTE — LETTER
Tiffany 3, 2019      Nixon Caldera Jr., MD  [address] [facility] [address] [place] [address]           [facility]  [address] [facility] [address] [place] [address]  Phone: [phone]          Patient: Darnell Knott   MR Number: [id number]   YOB: 1932   Date of Visit: 5/21/2019       Dear Dr. Nixon Caldera Jr.:    Thank you for referring Darnell Knott to me for evaluation. Attached you will find relevant portions of my assessment and plan of care.    If you have questions, please do not hesitate to call me. I look forward to following Darnell Knott along with you.    Sincerely,    Eddie Cline MD    Enclosure  CC:  No Recipients    If you would like to receive this communication electronically, please contact [email or web address] or [phone] to request more information on [facility] access.    For providers and/or their staff who would like to refer a patient to [facility], please contact us through our one-stop-shop provider referral line, Madan Sanderson, at [phone].    If you feel you have received this communication in error or would no longer like to receive these types of communications, please e-mail [email or web address]

## 2019-05-21 NOTE — PROGRESS NOTES
Subjective:   THIS NOTE IS DONE WITH VOICE RECOGNITION        Patient ID: Darnell Knott is a 86 y.o. male.    Chief Complaint: Fall (yesterday rt [place] ap[place], hurts to move )      HPI   Fell yesterday.  No LOC.  Right shoulder and elbow are painful.  Not able to move without difficulty.  No interventions to date (ice, splinting, ect.).History of rotator cuff tear on the right.    Dementia is gradually getting worse.    Here to follow up on blood pressure.  Taking current medications.    BP Readings from Last 3 Encounters:   05/21/19 120/70   02/13/19 (!) 159/71   01/21/19 139/64       Diabetes has not been controlled for some time.  No major changes.    Lab Results   Component Value Date    HGBA1C 8.7 (H) 01/21/2019    HGBA1C 8.2 (H) 10/09/2018    HGBA1C 8.2 (H) 04/05/2018       Lab Results   Component Value Date    LDLCALC 92.8 01/21/2019    LDLCALC 102.8 04/05/2018    LDLCALC 108.0 04/18/2017       Lab Results   Component Value Date    CREATININE 0.8 01/21/2019    CREATININE 0.9 04/05/2018    CREATININE 0.7 04/18/2017       Lab Results   Component Value Date    EGFRNONAA >60.0 01/21/2019    EGFRNONAA >60.0 04/05/2018    EGFRNONAA >60.0 04/18/2017     -    Current Outpatient Medications   Medication Sig Dispense Refill    acetaminophen (TYLENOL) 325 MG tablet Take 325 mg by mouth every 6 (six) hours as needed for Pain.      amLODIPine (NORVASC) 10 MG tablet Take 1 tablet (10 mg total) by mouth once daily. 90 tablet 3    aspirin 81 mg Tab 1 Tablet(s) Oral PRN Every day.        atorvastatin (LIPITOR) 40 MG tablet Take 1 tablet (40 mg total) by mouth once daily. 90 tablet 3    blood sugar diagnostic Strp use  test strips and lancets covered by insurance  two times a day. DX: E11.9 200 strip 3    donepezil (ARICEPT) 5 MG tablet Take 1 tablet (5 mg total) by mouth every evening. 90 tablet 3    glimepiride (AMARYL) 4 MG tablet Take 1.5 tablets (6 mg total) by mouth daily with breakfast. 135 tablet 1     lisinopril 10 MG Tab Take 1 tablet (10 mg total) by mouth once daily. 90 tablet 3    loperamide (IMODIUM) 2 mg capsule Take 2 mg by mouth 4 (four) times daily as needed for Diarrhea.      omeprazole (PRILOSEC) 20 MG capsule Take 1 capsule (20 mg total) by mouth once daily. 90 capsule 3    diabetic supplies, [employer]. Kit 1 Device by Misc.(Non-Drug; Combo Route) route once daily. Dx E11.9  Use diabetic test kit covered by insurance. Use two times a day. 1 kit 1     No current facility-administered medications for this visit.          Review of Systems   Constitutional: Negative for activity change, appetite change, fatigue and unexpected weight change.   HENT: Negative for postnasal drip, rhinorrhea, sinus pressure, sneezing, sore throat, trouble swallowing and voice change.    Eyes: Negative for discharge and visual disturbance.        No double vision. No unusual tearing.   Respiratory: Negative for cough, shortness of breath and wheezing.         No hemoptysis.   Cardiovascular:        No suggestion of angina. No PND or orthopnea. No unusual peripheral edema. No syncope. No suggestion of tachycardia.   Gastrointestinal: Negative for abdominal distention, abdominal pain, blood in stool and constipation.        No dysphasia or difficulty swallowing.   Genitourinary: Negative for dysuria, frequency, hematuria, scrotal swelling, testicular pain and urgency.        No nocturia.   Musculoskeletal: Positive for arthralgias and joint swelling. Negative for back pain, gait problem, neck pain and neck stiffness.   Skin: Negative for rash and wound.        No complaints of changing skin lesions. No new pigmented lesions. No erosions. No complaints of itching or burning.   Neurological: Positive for dizziness and weakness. Negative for tremors, seizures, light-headedness, numbness and headaches.   Hematological: Negative for adenopathy. Does not bruise/bleed easily.   Psychiatric/Behavioral: Positive for behavioral  problems, confusion and sleep disturbance. Negative for dysphoric mood, self-injury and suicidal ideas. The patient is not nervous/anxious.        Objective:      Physical Exam   Constitutional: He is oriented to person, place, and time. He appears well-developed and well-nourished. No distress.   HENT:   Head: Normocephalic and atraumatic.   Right Ear: External ear normal.   Left Ear: External ear normal.   Nose: Nose normal.   Mouth/Throat: Oropharynx is clear and moist. No oropharyngeal exudate.   Eyes: Pupils are equal, round, and reactive to light. Conjunctivae and EOM are normal. No scleral icterus.   Neck: Normal range of motion. Neck supple. No thyromegaly present.   Cardiovascular: Normal rate, regular rhythm and normal heart sounds.   Pulmonary/Chest: Effort normal and breath sounds normal. He has no wheezes. He has no rales.   Abdominal: Soft. Bowel sounds are normal. He exhibits no distension. There is no tenderness.   Musculoskeletal: He exhibits no edema.        Right shoulder: He exhibits decreased range of motion, tenderness and pain.        Right elbow: He exhibits decreased range of motion and swelling. Tenderness found. Radial head and lateral epicondyle tenderness noted.        Arms:  Lymphadenopathy:     He has no cervical adenopathy.   Neurological: He is alert and oriented to person, place, and time. He has normal reflexes. He exhibits normal muscle tone. Coordination normal.   Skin: Skin is warm and dry. Capillary refill takes less than 2 seconds. No rash noted.   Psychiatric:   Presents with typical pattern of dementia.  No significant change.   Vitals reviewed.      Assessment:       1. Acute pain of right shoulder    2. Closed displaced fracture of head of right radius, initial encounter    3. Diabetes mellitus with coincident hypertension        Plan:       1. Acute pain of right shoulder  Acute  Obtain x-rays    - X-Ray Shoulder Trauma 3 view Right  - X-Ray Elbow Complete Right    2.  Closed displaced fracture of head of right radius, initial encounter  Ambulatory referral to Orthopedics recommended  No pain medications given today.    - Ambulatory referral to Orthopedics    3. Diabetes mellitus with coincident hypertension  Anticipate hemoglobin A1c will be elevated  Continue to monitor renal functions  Do not anticipate more aggressive control of diabetes given his underlying behavior issues as well as his age.    - Hemoglobin A1c; Future  - Renal function panel; Future

## 2019-05-21 NOTE — PROGRESS NOTES
Chief Complaint   Patient presents with    Right Elbow - Injury, Pain       HPI:    This is a 86 y.o. who presents today complaining of right elbow pain for 1 days after fall. Pain is aching. No numbness or tingling. No associated signs or symptoms.      Past Medical History:   Diagnosis Date    Cancer     prostate    Colon polyps     Diabetes mellitus     HTN (hypertension) 4/30/2012    Hyperlipidemia     Hypertension     Kidney stone     Nephrolithiasis     Prostate cancer     s/p radical prostatectomy and radiotherapy    Trouble in sleeping     Type II or unspecified type diabetes mellitus without mention of complication, not stated as uncontrolled     Urinary incontinence       Past Surgical History:   Procedure Laterality Date    APPENDECTOMY      CATARACT EXTRACTION      od 8/0422.5 sa60at  os-8/04 22.5 sa60at    COLONOSCOPY N/A 11/13/2013    [facility]    COLONOSCOPY W/ POLYPECTOMY  5/16/2008  [facility]    Cecum with 8-10 mm polyp.    ADENOMATOUS POLYP.    Hepatic flex with 8-10 mm polyp.    ADENOMATOUS POLYP.    Transverse colon with two 8-10 mm polyps.  ADENOMATOUS POLYP.   Sigmoid diverticulosis.       COLONOSCOPY W/ POLYPECTOMY  11/30/2010  [facility]    One 2 to 3 mm polyp in the hepatic flexure.  TUBULAR ADENOMA.   One 1 mm polyp in the distal ascending colon.  TUBULAR ADENOMA.   One 2 mm polyp in the cecum.  TUBULAR ADENOMA.   Diverticulosis sigmoid colon.    Tortuous sigmoid colon.       HERNIA REPAIR      HERNIA REPAIR      bilateral    PROSTATE SURGERY  2002    dr flores    prostatecomy      ROTATOR CUFF REPAIR      bilateral    UPPER GASTROINTESTINAL ENDOSCOPY  5/16/2008  [facility]    Reflux esophagitis.  No intestinal metaplasia.   Benign stricture, dilated 54 Fr.  Hiatal hernia.  Normal stomach and duodenum.      Current Outpatient Medications on File Prior to Visit   Medication Sig Dispense Refill    diabetic supplies, miscellan.  Kit 1 Device by Misc.(Non-Drug; Combo Route) route once daily. Dx E11.9  Use diabetic test kit covered by insurance. Use two times a day. 1 kit 1    acetaminophen (TYLENOL) 325 MG tablet Take 325 mg by mouth every 6 (six) hours as needed for Pain.      amLODIPine (NORVASC) 10 MG tablet Take 1 tablet (10 mg total) by mouth once daily. 90 tablet 3    aspirin 81 mg Tab 1 Tablet(s) Oral PRN Every day.        atorvastatin (LIPITOR) 40 MG tablet Take 1 tablet (40 mg total) by mouth once daily. 90 tablet 3    blood sugar diagnostic Strp use  test strips and lancets covered by insurance  two times a day. DX: E11.9 200 strip 3    donepezil (ARICEPT) 5 MG tablet Take 1 tablet (5 mg total) by mouth every evening. 90 tablet 3    glimepiride (AMARYL) 4 MG tablet Take 1.5 tablets (6 mg total) by mouth daily with breakfast. 135 tablet 1    lisinopril 10 MG Tab Take 1 tablet (10 mg total) by mouth once daily. 90 tablet 3    loperamide (IMODIUM) 2 mg capsule Take 2 mg by mouth 4 (four) times daily as needed for Diarrhea.      omeprazole (PRILOSEC) 20 MG capsule Take 1 capsule (20 mg total) by mouth once daily. 90 capsule 3     No current facility-administered medications on file prior to visit.       Review of patient's allergies indicates:   Allergen Reactions    Metformin Diarrhea    Morphine      Other reaction(s): Hallucinations      Family History not pertinent   Social History     Socioeconomic History    Marital status:      Spouse name: Not on file    Number of children: 3    Years of education: Not on file    Highest education level: Not on file   Occupational History    Occupation: , farmer     Comment: retired   Social Needs    Financial resource strain: Not on file    Food insecurity:     Worry: Not on file     Inability: Not on file    Transportation needs:     Medical: Not on file     Non-medical: Not on file   Tobacco Use    Smoking status: Never Smoker    Smokeless tobacco: Current  User   Substance and Sexual Activity    Alcohol use: No     Comment: none    Drug use: No    Sexual activity: Yes     Partners: Female   Lifestyle    Physical activity:     Days per week: Not on file     Minutes per session: Not on file    Stress: Not on file   Relationships    Social connections:     Talks on phone: Not on file     Gets together: Not on file     Attends Church service: Not on file     Active member of club or organization: Not on file     Attends meetings of clubs or organizations: Not on file     Relationship status: Not on file   Other Topics Concern    Not on file   Social History Narrative    Not on file         Review of Systems:   Constitutional:  Denies fever or chills    Eyes:  Denies change in visual acuity    HENT:  Denies nasal congestion or sore throat    Respiratory:  Denies cough or shortness of breath    Cardiovascular:  Denies chest pain or edema    GI:  Denies abdominal pain, nausea, vomiting, bloody stools or diarrhea    :  Denies dysuria    Integument:  Denies rash    Neurologic:  Denies headache, focal weakness or sensory changes    Endocrine:  Denies polyuria or polydipsia    Lymphatic:  Denies swollen glands    Psychiatric:  Denies depression or anxiety       Physical Exam:    Constitutional:  Well developed, well nourished, no acute distress, non-toxic appearance    Integument:  Well hydrated, no rash    Lymphatic:  No lymphadenopathy noted    Neurologic:  Alert & oriented x 3  Psychiatric:  Speech and behavior appropriate     Left elbow  Exam performed same as contralateral side and is normal  Right elbow  Point TTP noted about the radial head. Pain with supination and extension. Skin intact. Compartments soft. NVI distally.      X-rays were performed today, personally reviewed by me and findings discussed with the patient.   3 views of the right elbow show displaced radial neck fracture     Closed displaced fracture of neck of right radius, initial  encounter    Other orders  -     Discontinue: HYDROcodone-acetaminophen (NORCO)  mg per tablet; Take 1 tablet by mouth every 6 (six) hours as needed for Pain.  Dispense: 44 tablet; Refill: 0  -     HYDROcodone-acetaminophen (NORCO)  mg per tablet; Take 1 tablet by mouth every 6 (six) hours as needed for Pain.  Dispense: 44 tablet; Refill: 0        Discussed treatment options, elects non operative treatment at this time. Strict NWB with gentle ROM out of sling beginning in 1 week. RTC 4 weeks with Dr. Sierra.

## 2019-05-23 ENCOUNTER — TELEPHONE (OUTPATIENT)
Dept: FAMILY MEDICINE | Facility: CLINIC | Age: 84
End: 2019-05-23

## 2019-05-23 NOTE — TELEPHONE ENCOUNTER
----- Message from Kanu Chandler sent at 5/23/2019 11:22 AM CDT -----  Type: Needs Medical Advice    Who Called:  Wife- Love   Symptoms (please be specific): How long has patient had these symptoms:    Pharmacy name and phone #:    Best Call Back Number: [phone] Additional Information: Patient fractured his elbow, patient was advised, he doesn't need surgery. Patient is dizzy when standing up. Patient's wife asking to discuss with the nurse.

## 2019-05-23 NOTE — TELEPHONE ENCOUNTER
Spoke to patient's wife and states that patient's balance is very concerning at this time. Wife states patient's balance has always been an issue, but has gotten worse since him hurting his elbow. Wife describes it as patient is wanting to fall backwards, instead of falling forward. Wife wanted to bring this to Dr. Caldera's attention and see what and if anything needs to be addressed.

## 2019-05-24 NOTE — PATIENT INSTRUCTIONS
Mr. Knott,    You do have a broken right elbow.  For 2 to sleeve this should heal without intervention surgically.  I would encourage you to continue as much range of motion in the elbow and shoulder as possible.    We will check your diabetes today, I am hoping that we do not have to change medications.    Nixon Caldera MD

## 2019-06-19 ENCOUNTER — HOSPITAL ENCOUNTER (OUTPATIENT)
Dept: RADIOLOGY | Facility: HOSPITAL | Age: 84
Discharge: HOME | End: 2019-06-19
Attending: ORTHOPAEDIC SURGERY
Payer: MEDICARE

## 2019-06-19 ENCOUNTER — OFFICE VISIT (OUTPATIENT)
Dept: ORTHOPEDICS | Facility: CLINIC | Age: 84
End: 2019-06-19
Payer: MEDICARE

## 2019-06-19 VITALS
HEART RATE: 57 BPM | DIASTOLIC BLOOD PRESSURE: 62 MMHG | BODY MASS INDEX: 26.5 KG/M2 | WEIGHT: 144 LBS | SYSTOLIC BLOOD PRESSURE: 170 MMHG | HEIGHT: 62 IN

## 2019-06-19 DIAGNOSIS — S52.131A CLOSED DISPLACED FRACTURE OF NECK OF RIGHT RADIUS, INITIAL ENCOUNTER: Primary | ICD-10-CM

## 2019-06-19 DIAGNOSIS — S52.131A CLOSED DISPLACED FRACTURE OF NECK OF RIGHT RADIUS, INITIAL ENCOUNTER: ICD-10-CM

## 2019-06-19 PROCEDURE — 73080 XR ELBOW COMPLETE 3 VIEW RIGHT: ICD-10-PCS | Mod: 26,HCNC,RT, | Performed by: RADIOLOGY

## 2019-06-19 PROCEDURE — 1100F PTFALLS ASSESS-DOCD GE2>/YR: CPT | Mod: HCNC,CPTII,S$GLB, | Performed by: ORTHOPAEDIC SURGERY

## 2019-06-19 PROCEDURE — 3288F FALL RISK ASSESSMENT DOCD: CPT | Mod: HCNC,CPTII,S$GLB, | Performed by: ORTHOPAEDIC SURGERY

## 2019-06-19 PROCEDURE — 99999 PR PBB SHADOW E&M-EST. PATIENT-LVL III: CPT | Mod: PBBFAC,HCNC,, | Performed by: ORTHOPAEDIC SURGERY

## 2019-06-19 PROCEDURE — 99024 PR POST-OP FOLLOW-UP VISIT: ICD-10-PCS | Mod: HCNC,S$GLB,, | Performed by: ORTHOPAEDIC SURGERY

## 2019-06-19 PROCEDURE — 99024 POSTOP FOLLOW-UP VISIT: CPT | Mod: HCNC,S$GLB,, | Performed by: ORTHOPAEDIC SURGERY

## 2019-06-19 PROCEDURE — 73080 X-RAY EXAM OF ELBOW: CPT | Mod: TC,HCNC,PO,RT

## 2019-06-19 PROCEDURE — 73080 X-RAY EXAM OF ELBOW: CPT | Mod: 26,HCNC,RT, | Performed by: RADIOLOGY

## 2019-06-19 PROCEDURE — 99999 PR PBB SHADOW E&M-EST. PATIENT-LVL III: ICD-10-PCS | Mod: PBBFAC,HCNC,, | Performed by: ORTHOPAEDIC SURGERY

## 2019-06-19 PROCEDURE — 3288F PR FALLS RISK ASSESSMENT DOCUMENTED: ICD-10-PCS | Mod: HCNC,CPTII,S$GLB, | Performed by: ORTHOPAEDIC SURGERY

## 2019-06-19 PROCEDURE — 1100F PR PT FALLS ASSESS DOC 2+ FALLS/FALL W/INJURY/YR: ICD-10-PCS | Mod: HCNC,CPTII,S$GLB, | Performed by: ORTHOPAEDIC SURGERY

## 2019-06-19 NOTE — H&P
6/19/2019    Chief Complaint:  Chief Complaint   Patient presents with    Right Elbow - Injury       HPI:  Darnell Knott is a 86 y.o. male, who presents to clinic today approximately 4 weeks ago he had a fall and injured his right elbow.  He was told that a fracture of his radial neck. He has not been to a physician for further treatment. He is here today for an initial evaluation with me.  He only complains of mild pain to the right elbow.  There are no other complaints.    PMHX:  Past Medical History:   Diagnosis Date    Cancer     prostate    Colon polyps     Diabetes mellitus     HTN (hypertension) 4/30/2012    Hyperlipidemia     Hypertension     Kidney stone     Nephrolithiasis     Prostate cancer     s/p radical prostatectomy and radiotherapy    Trouble in sleeping     Type II or unspecified type diabetes mellitus without mention of complication, not stated as uncontrolled     Urinary incontinence        PSHX:  Past Surgical History:   Procedure Laterality Date    APPENDECTOMY      CATARACT EXTRACTION      od 8/0422.5 sa60at  os-8/04 22.5 sa60at    COLONOSCOPY N/A 11/13/2013    [facility]    COLONOSCOPY W/ POLYPECTOMY  5/16/2008  [facility]    Cecum with 8-10 mm polyp.    ADENOMATOUS POLYP.    Hepatic flex with 8-10 mm polyp.    ADENOMATOUS POLYP.    Transverse colon with two 8-10 mm polyps.  ADENOMATOUS POLYP.   Sigmoid diverticulosis.       COLONOSCOPY W/ POLYPECTOMY  11/30/2010  [facility]    One 2 to 3 mm polyp in the hepatic flexure.  TUBULAR ADENOMA.   One 1 mm polyp in the distal ascending colon.  TUBULAR ADENOMA.   One 2 mm polyp in the cecum.  TUBULAR ADENOMA.   Diverticulosis sigmoid colon.    Tortuous sigmoid colon.       HERNIA REPAIR      HERNIA REPAIR      bilateral    PROSTATE SURGERY  2002    dr flores    prostatecomy      ROTATOR CUFF REPAIR      bilateral    UPPER GASTROINTESTINAL ENDOSCOPY  5/16/2008  [facility]    Reflux  esophagitis.  No intestinal metaplasia.   Benign stricture, dilated 54 Fr.  Hiatal hernia.  Normal stomach and duodenum.       FMHX:  Family History   Problem Relation Age of Onset    Hypertension Father     Alcohol abuse Brother     Diabetes Sister     Amblyopia Neg Hx     Blindness Neg Hx     Cancer Neg Hx     Cataracts Neg Hx     Glaucoma Neg Hx     Retinal detachment Neg Hx     Strabismus Neg Hx     Stroke Neg Hx     Thyroid disease Neg Hx     Macular degeneration Neg Hx     Melanoma Neg Hx     Psoriasis Neg Hx     Lupus Neg Hx     Eczema Neg Hx        SOCHX:  Social History     Tobacco Use    Smoking status: Never Smoker    Smokeless tobacco: Current User   Substance Use Topics    Alcohol use: No     Comment: none       ALLERGIES:  Metformin and Morphine    CURRENT MEDICATIONS:  Current Outpatient Medications on File Prior to Visit   Medication Sig Dispense Refill    acetaminophen (TYLENOL) 325 MG tablet Take 325 mg by mouth every 6 (six) hours as needed for Pain.      amLODIPine (NORVASC) 10 MG tablet Take 1 tablet (10 mg total) by mouth once daily. 90 tablet 3    aspirin 81 mg Tab 1 Tablet(s) Oral PRN Every day.        atorvastatin (LIPITOR) 40 MG tablet Take 1 tablet (40 mg total) by mouth once daily. 90 tablet 3    blood sugar diagnostic Strp use  test strips and lancets covered by insurance  two times a day. DX: E11.9 200 strip 3    donepezil (ARICEPT) 5 MG tablet Take 1 tablet (5 mg total) by mouth every evening. 90 tablet 3    glimepiride (AMARYL) 4 MG tablet Take 1.5 tablets (6 mg total) by mouth daily with breakfast. 135 tablet 1    HYDROcodone-acetaminophen (NORCO)  mg per tablet Take 1 tablet by mouth every 6 (six) hours as needed for Pain. 44 tablet 0    lisinopril 10 MG Tab Take 1 tablet (10 mg total) by mouth once daily. 90 tablet 3    loperamide (IMODIUM) 2 mg capsule Take 2 mg by mouth 4 (four) times daily as needed for Diarrhea.      omeprazole (PRILOSEC) 20  "MG capsule Take 1 capsule (20 mg total) by mouth once daily. 90 capsule 3    diabetic supplies, miscellan. Kit 1 Device by Misc.(Non-Drug; Combo Route) route once daily. Dx E11.9  Use diabetic test kit covered by insurance. Use two times a day. 1 kit 1     No current facility-administered medications on file prior to visit.        REVIEW OF SYSTEMS:  Review of Systems   Constitutional: Negative.    HENT: Negative for hearing loss, nosebleeds and sore throat.    Respiratory: Negative for shortness of breath and wheezing.    Cardiovascular: Negative for chest pain and palpitations.   Gastrointestinal: Negative for heartburn, nausea and vomiting.   Genitourinary: Positive for dysuria and urgency.   Skin: Negative.    Neurological: Positive for sensory change. Negative for seizures, loss of consciousness and weakness.   Psychiatric/Behavioral: Positive for memory loss.       GENERAL PHYSICAL EXAM:   BP (!) 170/62   Pulse (!) 57   Ht 5' 2" (1.575 m)   Wt 65.3 kg (144 lb)   BMI 26.34 kg/m²    GEN:  Alert and oriented, with mild confusion, no acute distress   HENT: Normocephalic, atraumatic   EYES: No discharge, conjunctiva normal   NECK: Supple, non-tender   PULM: No wheezing, no respiratory distress   CV: RRR   ABD: Soft, non-tender    ORTHO EXAM:   Examination the right arm and elbow reveals that there is minimal to no edema.  There is no gross deformity.  Palpation produces minimal tenderness over the region of the radial head and neck.  There is no medial-sided tenderness. Range of motion of the elbow is 40 to 90°.  He has approximately 10° of supination and 20° of pronation.  Has a 2+ radial pulse and sensation is intact in the median radial and ulnar distributions    RADIOLOGY:   X-rays of the right elbow were taken in clinic today.  The films have been reviewed.  He is noted to have a fracture of the radial neck which is mildly displaced.  It is not have significant signs of healing at this " point.    ASSESSMENT:   Right displaced radial neck fracture    PLAN:  1.  I have discussed treatment options with the patient and his family.  They do not want surgery at this time.  I think that it is reasonable to work on his range of motion to see if he can get to a functional level of motion without pain prior to considering any surgical procedure    2.  Will follow up with me in 4 weeks with repeat x-rays of the right elbow for further discussion of treatments

## 2019-07-08 DIAGNOSIS — F03.90 DEMENTIA WITHOUT BEHAVIORAL DISTURBANCE, UNSPECIFIED DEMENTIA TYPE: Chronic | ICD-10-CM

## 2019-07-08 DIAGNOSIS — E78.5 HYPERLIPIDEMIA, UNSPECIFIED HYPERLIPIDEMIA TYPE: ICD-10-CM

## 2019-07-08 DIAGNOSIS — I10 ESSENTIAL HYPERTENSION: Chronic | ICD-10-CM

## 2019-07-08 DIAGNOSIS — E11.9 TYPE 2 DIABETES MELLITUS WITHOUT COMPLICATION, WITHOUT LONG-TERM CURRENT USE OF INSULIN: ICD-10-CM

## 2019-07-08 RX ORDER — GLIMEPIRIDE 4 MG/1
6 TABLET ORAL
Qty: 135 TABLET | Refills: 3 | Status: SHIPPED | OUTPATIENT
Start: 2019-07-08 | End: 2020-05-20 | Stop reason: SDUPTHER

## 2019-07-08 RX ORDER — ATORVASTATIN CALCIUM 40 MG/1
40 TABLET, FILM COATED ORAL DAILY
Qty: 90 TABLET | Refills: 3 | Status: SHIPPED | OUTPATIENT
Start: 2019-07-08 | End: 2020-05-20 | Stop reason: SDUPTHER

## 2019-07-08 RX ORDER — AMLODIPINE BESYLATE 10 MG/1
10 TABLET ORAL DAILY
Qty: 90 TABLET | Refills: 3 | Status: SHIPPED | OUTPATIENT
Start: 2019-07-08 | End: 2020-05-20 | Stop reason: SDUPTHER

## 2019-07-08 RX ORDER — DONEPEZIL HYDROCHLORIDE 5 MG/1
5 TABLET, FILM COATED ORAL NIGHTLY
Qty: 90 TABLET | Refills: 3 | Status: SHIPPED | OUTPATIENT
Start: 2019-07-08 | End: 2020-05-20 | Stop reason: SDUPTHER

## 2019-07-16 DIAGNOSIS — S52.131D CLOSED DISPLACED FRACTURE OF NECK OF RIGHT RADIUS WITH ROUTINE HEALING, SUBSEQUENT ENCOUNTER: Primary | ICD-10-CM

## 2019-07-17 ENCOUNTER — OFFICE VISIT (OUTPATIENT)
Dept: ORTHOPEDICS | Facility: CLINIC | Age: 84
End: 2019-07-17
Payer: MEDICARE

## 2019-07-17 ENCOUNTER — HOSPITAL ENCOUNTER (OUTPATIENT)
Dept: RADIOLOGY | Facility: HOSPITAL | Age: 84
Discharge: HOME | End: 2019-07-17
Attending: ORTHOPAEDIC SURGERY
Payer: MEDICARE

## 2019-07-17 VITALS
BODY MASS INDEX: 26.5 KG/M2 | WEIGHT: 144 LBS | SYSTOLIC BLOOD PRESSURE: 153 MMHG | DIASTOLIC BLOOD PRESSURE: 68 MMHG | HEART RATE: 56 BPM | HEIGHT: 62 IN

## 2019-07-17 DIAGNOSIS — S52.131D CLOSED DISPLACED FRACTURE OF NECK OF RIGHT RADIUS WITH ROUTINE HEALING, SUBSEQUENT ENCOUNTER: ICD-10-CM

## 2019-07-17 DIAGNOSIS — S52.131K CLOSED DISPLACED FRACTURE OF NECK OF RIGHT RADIUS WITH NONUNION, SUBSEQUENT ENCOUNTER: Primary | ICD-10-CM

## 2019-07-17 PROCEDURE — 73080 XR ELBOW COMPLETE 3 VIEW RIGHT: ICD-10-PCS | Mod: 26,HCNC,RT, | Performed by: RADIOLOGY

## 2019-07-17 PROCEDURE — 73080 X-RAY EXAM OF ELBOW: CPT | Mod: 26,HCNC,RT, | Performed by: RADIOLOGY

## 2019-07-17 PROCEDURE — 73080 X-RAY EXAM OF ELBOW: CPT | Mod: TC,HCNC,PO,RT

## 2019-07-17 PROCEDURE — 99999 PR PBB SHADOW E&M-EST. PATIENT-LVL III: CPT | Mod: PBBFAC,HCNC,, | Performed by: ORTHOPAEDIC SURGERY

## 2019-07-17 PROCEDURE — 1101F PR PT FALLS ASSESS DOC 0-1 FALLS W/OUT INJ PAST YR: ICD-10-PCS | Mod: HCNC,CPTII,S$GLB, | Performed by: ORTHOPAEDIC SURGERY

## 2019-07-17 PROCEDURE — 1101F PT FALLS ASSESS-DOCD LE1/YR: CPT | Mod: HCNC,CPTII,S$GLB, | Performed by: ORTHOPAEDIC SURGERY

## 2019-07-17 PROCEDURE — 99999 PR PBB SHADOW E&M-EST. PATIENT-LVL III: ICD-10-PCS | Mod: PBBFAC,HCNC,, | Performed by: ORTHOPAEDIC SURGERY

## 2019-07-17 PROCEDURE — 99024 PR POST-OP FOLLOW-UP VISIT: ICD-10-PCS | Mod: HCNC,S$GLB,, | Performed by: ORTHOPAEDIC SURGERY

## 2019-07-17 PROCEDURE — 99024 POSTOP FOLLOW-UP VISIT: CPT | Mod: HCNC,S$GLB,, | Performed by: ORTHOPAEDIC SURGERY

## 2019-07-18 NOTE — PROGRESS NOTES
Mr Knott returns to clinic today.  Has a history of a right radial head fracture. He is here today for follow-up.  States that he is only having mild pain at the ends of motion. He states that he does have limitation of motion but that he feels as if he is functional enough.    Physical exam:  Examination the right elbow reveals that there is no edema.  There are no skin changes.  Palpation produces mild tenderness over the radial head. Range of motion is 40 to 90°.  Pronation and supination are also limited.  He has a 2+ radial pulse and sensation is intact.    Radiology:  X-rays of the right elbow were taken in clinic today.  He is noted have a fracture of the radial head and neck.  This is non united.    Assessment:  Right radial head neck fracture    Plan:    1.  I again discussed the possibility of surgical excision of the radial head fragments.  The patient states that he does not want undergo any surgery. He understands that he will have continued pain and limitation of motion.    2.  He will follow up with me on a p.r.n. basis

## 2019-10-10 ENCOUNTER — LAB VISIT (OUTPATIENT)
Dept: LAB | Facility: HOSPITAL | Age: 84
End: 2019-10-10
Attending: EMERGENCY MEDICINE
Payer: MEDICARE

## 2019-10-10 ENCOUNTER — OFFICE VISIT (OUTPATIENT)
Dept: FAMILY MEDICINE | Facility: CLINIC | Age: 84
End: 2019-10-10
Payer: MEDICARE

## 2019-10-10 DIAGNOSIS — E11.9 DIABETES MELLITUS WITH COINCIDENT HYPERTENSION: Primary | Chronic | ICD-10-CM

## 2019-10-10 DIAGNOSIS — Z85.46 HISTORY OF PROSTATE CANCER: Chronic | ICD-10-CM

## 2019-10-10 DIAGNOSIS — K21.9 GASTROESOPHAGEAL REFLUX DISEASE WITHOUT ESOPHAGITIS: ICD-10-CM

## 2019-10-10 DIAGNOSIS — E11.9 DIABETES MELLITUS WITH COINCIDENT HYPERTENSION: Chronic | ICD-10-CM

## 2019-10-10 DIAGNOSIS — I10 ESSENTIAL HYPERTENSION: Chronic | ICD-10-CM

## 2019-10-10 DIAGNOSIS — I10 DIABETES MELLITUS WITH COINCIDENT HYPERTENSION: Chronic | ICD-10-CM

## 2019-10-10 DIAGNOSIS — I10 DIABETES MELLITUS WITH COINCIDENT HYPERTENSION: Primary | Chronic | ICD-10-CM

## 2019-10-10 DIAGNOSIS — F03.90 DEMENTIA WITHOUT BEHAVIORAL DISTURBANCE, UNSPECIFIED DEMENTIA TYPE: Chronic | ICD-10-CM

## 2019-10-10 DIAGNOSIS — R32 URINARY INCONTINENCE, UNSPECIFIED TYPE: ICD-10-CM

## 2019-10-10 PROCEDURE — 90662 FLU VACCINE - HIGH DOSE (65+) PRESERVATIVE FREE IM: ICD-10-PCS | Mod: HCNC,S$GLB,, | Performed by: EMERGENCY MEDICINE

## 2019-10-10 PROCEDURE — G0008 ADMIN INFLUENZA VIRUS VAC: HCPCS | Mod: HCNC,S$GLB,, | Performed by: EMERGENCY MEDICINE

## 2019-10-10 PROCEDURE — G0008 FLU VACCINE - HIGH DOSE (65+) PRESERVATIVE FREE IM: ICD-10-PCS | Mod: HCNC,S$GLB,, | Performed by: EMERGENCY MEDICINE

## 2019-10-10 PROCEDURE — 99999 PR PBB SHADOW E&M-EST. PATIENT-LVL IV: ICD-10-PCS | Mod: PBBFAC,HCNC,, | Performed by: EMERGENCY MEDICINE

## 2019-10-10 PROCEDURE — 99499 RISK ADDL DX/OHS AUDIT: ICD-10-PCS | Mod: HCNC,S$GLB,, | Performed by: EMERGENCY MEDICINE

## 2019-10-10 PROCEDURE — 99999 PR PBB SHADOW E&M-EST. PATIENT-LVL IV: CPT | Mod: PBBFAC,HCNC,, | Performed by: EMERGENCY MEDICINE

## 2019-10-10 PROCEDURE — 1101F PR PT FALLS ASSESS DOC 0-1 FALLS W/OUT INJ PAST YR: ICD-10-PCS | Mod: HCNC,CPTII,S$GLB, | Performed by: EMERGENCY MEDICINE

## 2019-10-10 PROCEDURE — 1101F PT FALLS ASSESS-DOCD LE1/YR: CPT | Mod: HCNC,CPTII,S$GLB, | Performed by: EMERGENCY MEDICINE

## 2019-10-10 PROCEDURE — 99499 UNLISTED E&M SERVICE: CPT | Mod: HCNC,S$GLB,, | Performed by: EMERGENCY MEDICINE

## 2019-10-10 PROCEDURE — 99213 OFFICE O/P EST LOW 20 MIN: CPT | Mod: HCNC,25,S$GLB, | Performed by: EMERGENCY MEDICINE

## 2019-10-10 PROCEDURE — 36415 COLL VENOUS BLD VENIPUNCTURE: CPT | Mod: HCNC,PO

## 2019-10-10 PROCEDURE — 99213 PR OFFICE/OUTPT VISIT, EST, LEVL III, 20-29 MIN: ICD-10-PCS | Mod: HCNC,25,S$GLB, | Performed by: EMERGENCY MEDICINE

## 2019-10-10 PROCEDURE — 90662 IIV NO PRSV INCREASED AG IM: CPT | Mod: HCNC,S$GLB,, | Performed by: EMERGENCY MEDICINE

## 2019-10-10 PROCEDURE — 83036 HEMOGLOBIN GLYCOSYLATED A1C: CPT | Mod: HCNC

## 2019-10-10 PROCEDURE — 84153 ASSAY OF PSA TOTAL: CPT | Mod: HCNC

## 2019-10-10 RX ORDER — OMEPRAZOLE 20 MG/1
20 CAPSULE, DELAYED RELEASE ORAL DAILY
Qty: 90 CAPSULE | Refills: 3 | Status: CANCELLED | OUTPATIENT
Start: 2019-10-10

## 2019-10-10 NOTE — PATIENT INSTRUCTIONS
Relious,    We will check your diabetes today.    We will call you with the results.    You are very stable.    See you in four months.    Nixon Caldera MD

## 2019-10-10 NOTE — PROGRESS NOTES
Subjective:   THIS NOTE IS DONE WITH VOICE RECOGNITION        Patient ID: Mayra Knott is a 87 y.o. male.    Chief Complaint: Hyperlipidemia      HPI     He is in today to follow-up on his medical conditions including his memory.    He is no longer driving.  He is no longer leaving home without support.  His memory continues to decline, as based on his wife's comments.  He is doing better with getting to the bathroom for bowel movements.  Not so good with the urinary issues.  He is wearing full-time adult protection.  He is no longer using Imodium on a regular basis.    Since stopping the metformin, his bowel issues have been markedly improved.  He is continuing to use the glimepiride at 6 milligrams a day.    Lab Results   Component Value Date    HGBA1C 7.7 (H) 10/10/2019    HGBA1C 8.4 (H) 05/21/2019    HGBA1C 8.7 (H) 01/21/2019     Lab Results   Component Value Date    LDLCALC 92.8 01/21/2019    LDLCALC 102.8 04/05/2018    LDLCALC 108.0 04/18/2017     Lab Results   Component Value Date    CREATININE 0.9 05/21/2019    CREATININE 0.8 01/21/2019    CREATININE 0.9 04/05/2018     Lab Results   Component Value Date    ESTGFRAFRICA >60.0 05/21/2019    ESTGFRAFRICA >60.0 01/21/2019    ESTGFRAFRICA >60.0 04/05/2018     His radial head fracture has healed.  No residual.    Here to follow up on blood pressure.  Taking current medications.  No ankle edema with the amlodipine.  No cough with his lisinopril.      BP Readings from Last 3 Encounters:   10/10/19 130/67   07/17/19 (!) 153/68   06/19/19 (!) 170/62     As noted above, his dementia is gradually progressing.  He is very reluctant to be on a side of his wife.  Can be some help to her, but he is not able to stay alone.  They are dull son does provide some relief for his wife.    Does have a history of prostate cancer.  Diagnostic PSA is remain well controlled.    Results for MAYRA KNOTT (MRN [id number]) as of 10/11/2019 18:32   Ref. Range 2/6/2015 10:03 2/23/2016  10:18 4/18/2017 09:37 4/5/2018 12:37 10/10/2019 15:33   PSA DIAGNOSTIC Latest Ref Range: 0.00 - 4.00 ng/mL  <0.01 <0.01 <0.01 <0.01       We did briefly discuss advanced directives, living [place], and power of .  This will need to be discussed again.    Immunization History   Administered Date(s) Administered    Influenza - High Dose - PF (65 years and older) 12/14/2012, 09/24/2013, 10/03/2014, 10/27/2015, 11/22/2016, 10/19/2017, 10/09/2018, 10/10/2019    Influenza A (H1N1) 2009 Monovalent - IM 02/02/2010    Influenza Split 11/19/2009, 10/26/2010, 09/17/2011    Pneumococcal Conjugate 06/24/2004    Pneumococcal Conjugate - 13 Valent 06/24/2004, 02/24/2015    Pneumococcal Polysaccharide - 23 Valent 04/27/2016     Influenza vaccine given today.    Current Outpatient Medications   Medication Sig Dispense Refill    acetaminophen (TYLENOL) 325 MG tablet Take 325 mg by mouth every 6 (six) hours as needed for Pain.      amLODIPine (NORVASC) 10 MG tablet Take 1 tablet (10 mg total) by mouth once daily. 90 tablet 3    aspirin 81 mg Tab 1 Tablet(s) Oral PRN Every day.        atorvastatin (LIPITOR) 40 MG tablet Take 1 tablet (40 mg total) by mouth once daily. 90 tablet 3    blood sugar diagnostic Strp use  test strips and lancets covered by insurance  two times a day. DX: E11.9 200 strip 3    diabetic supplies, [employer]. Kit 1 Device by Misc.(Non-Drug; Combo Route) route once daily. Dx E11.9  Use diabetic test kit covered by insurance. Use two times a day. 1 kit 1    donepezil (ARICEPT) 5 MG tablet Take 1 tablet (5 mg total) by mouth every evening. 90 tablet 3    glimepiride (AMARYL) 4 MG tablet Take 1.5 tablets (6 mg total) by mouth daily with breakfast. 135 tablet 3    lisinopril 10 MG Tab Take 1 tablet (10 mg total) by mouth once daily. 90 tablet 3    loperamide (IMODIUM) 2 mg capsule Take 2 mg by mouth 4 (four) times daily as needed for Diarrhea.      omeprazole (PRILOSEC) 20 MG capsule Take 1  capsule (20 mg total) by mouth once daily. 90 capsule 3     No current facility-administered medications for this visit.          Review of Systems   Unable to perform ROS: Dementia       Objective:      Physical Exam   Constitutional: He appears well-developed and well-nourished.   He is very pleasant.  He is examined.  He is oriented to person, but not place or time.   HENT:   Head: Normocephalic.   Mouth/Throat: Oropharynx is clear and moist.   Eyes: Conjunctivae are normal. Right eye exhibits no discharge. Left eye exhibits no discharge. No scleral icterus.   Neck: Normal range of motion. No thyromegaly present.   Cardiovascular: Normal rate, regular rhythm, normal heart sounds and intact distal pulses.   Pulses:       Dorsalis pedis pulses are 2+ on the right side, and 2+ on the left side.        Posterior tibial pulses are 2+ on the right side, and 2+ on the left side.   Pulmonary/Chest: Effort normal. He has no wheezes.   Abdominal: Soft. He exhibits no distension. There is no tenderness.   Musculoskeletal: He exhibits no edema, tenderness (Radial head is nontender.) or deformity.        Right foot: There is no deformity.        Left foot: There is no deformity.   Feet:   Right Foot:   Protective Sensation: 5 sites tested. 5 sites sensed.   Skin Integrity: Negative for skin breakdown.   Left Foot:   Protective Sensation: 5 sites tested. 5 sites sensed.   Skin Integrity: Negative for skin breakdown.   Lymphadenopathy:     He has no cervical adenopathy.   Neurological: He is alert. He has normal strength. He displays tremor. No cranial nerve deficit. He exhibits normal muscle tone. He displays no seizure activity. Gait normal.   Reflex Scores:       Patellar reflexes are 1+ on the right side and 1+ on the left side.       Achilles reflexes are 1+ on the right side and 1+ on the left side.  His gait is remarkably fluid in quick.      There is no significant cogwheeling appreciated.  He does have suggestion of  tremor in his right hand.  Whether this is a behavioral issue or tremor is unclear.  His wife says that he has had this for a long time, and is worse when he is nervous.    His short-term memory is clearly compromised, with essentially no recall.   Skin: Skin is warm and dry. Capillary refill takes less than 2 seconds. No rash noted.   Vitals reviewed.      Assessment:       1. Diabetes mellitus with coincident hypertension    2. Gastroesophageal reflux disease without esophagitis    3. Dementia without behavioral disturbance, unspecified dementia type    4. History of prostate cancer    5. Essential hypertension    6. Urinary incontinence, unspecified type        Plan:       1. Gastroesophageal reflux disease without esophagitis  Stable  No active bleeding  No intervention anticipated    2. Diabetes mellitus with coincident hypertension  Diabetes controlled, even off metformin.  No change in management.    - Hemoglobin A1c; Future    3. Dementia without behavioral disturbance, unspecified dementia type  Gradually worsening  Long-term planning discussed.  There is no alternative plan being formulated by the family if he needs more care.    4. History of prostate cancer  Stable, without evidence of recurrent disease.  Annual diagnostic PSA    5. Essential hypertension  At target today  Question control  Have ask his wife to get a couple of blood pressures are months for validation.    6. Urinary incontinence, unspecified type  Not a good candidate for medication intervention  Continue barrier methods     Plan on rechecking 3-4 months.  No new medications recommended.

## 2019-10-11 VITALS
DIASTOLIC BLOOD PRESSURE: 67 MMHG | BODY MASS INDEX: 26.17 KG/M2 | RESPIRATION RATE: 17 BRPM | SYSTOLIC BLOOD PRESSURE: 130 MMHG | HEIGHT: 62 IN | HEART RATE: 59 BPM | OXYGEN SATURATION: 94 % | WEIGHT: 142.19 LBS

## 2019-10-11 PROBLEM — S52.131A CLOSED DISPLACED FRACTURE OF NECK OF RIGHT RADIUS: Status: RESOLVED | Noted: 2019-05-21 | Resolved: 2019-10-11

## 2019-10-11 LAB
COMPLEXED PSA SERPL-MCNC: <0.01 NG/ML (ref 0–4)
ESTIMATED AVG GLUCOSE: 174 MG/DL (ref 68–131)
HBA1C MFR BLD HPLC: 7.7 % (ref 4–5.6)

## 2019-11-12 DIAGNOSIS — E11.9 TYPE 2 DIABETES MELLITUS WITHOUT COMPLICATION, WITHOUT LONG-TERM CURRENT USE OF INSULIN: ICD-10-CM

## 2019-11-12 DIAGNOSIS — I10 ESSENTIAL HYPERTENSION: Chronic | ICD-10-CM

## 2019-11-12 NOTE — TELEPHONE ENCOUNTER
----- Message from Ashley Carla sent at 11/12/2019 11:32 AM CST -----  Contact: Wife  Type: Needs Medical Advice    Who Called:  Wife    Best Call Back Number:     Additional Information: Requesting a call back from Nurse, regarding a refill Rx lisinopril 10 MG Tab 90 tablet be send to [facility] ,please call back with advice

## 2019-11-13 NOTE — PROGRESS NOTES
Refill Authorization Note     is requesting a refill authorization.    Brief assessment and rationale for refill: QUICK DC: rts(1/20)  Name and strength of medication: lisinopril 10 MG Tab       Medication Therapy Plan: rts(1/20)    Medication reconciliation completed: No   Pharmacist Review Requested: Yes                     Comments:  Appointments past 12m or future 3m    Date Provider   Last Visit   10/10/2019 Nixon Caldera Jr., MD   Next Visit   2/18/2020 Nixon Caldera Jr., MD       Last Prescribed Info:   Ordering Provider: -- PAM #:  -- NPI:  --    Authorizing Provider: Nixon Caldera Jr., MD PAM #:  [id number] NPI:  [id number]    Ordering User:  Nixon Caldera Jr., MD            Diagnosis Association: Essential hypertension (I10); Type 2 diabetes mellitus without complication, without long-term current use of insulin (E11.9)      Original Order:  lisinopril (PRINIVIL,ZESTRIL) 5 MG tablet [[id number]]      Pharmacy:  [facility] - [address] PAM #:  --     Pharmacy Comments:  --          Fill quantity remaining:  -- Fill quantity used:  -- Next fill due: --       Outpatient Medication Detail      Disp Refills Start End    lisinopril 10 MG Tab 90 tablet 3 1/21/2019     Sig - Route: Take 1 tablet (10 mg total) by mouth once daily. - Oral    Sent to pharmacy as: lisinopril 10 MG Tab    E-Prescribing Status: Receipt confirmed by pharmacy (1/21/2019 11:12 AM CST)

## 2019-12-16 DIAGNOSIS — I10 ESSENTIAL HYPERTENSION: Chronic | ICD-10-CM

## 2019-12-16 DIAGNOSIS — E11.9 TYPE 2 DIABETES MELLITUS WITHOUT COMPLICATION, WITHOUT LONG-TERM CURRENT USE OF INSULIN: ICD-10-CM

## 2019-12-16 NOTE — TELEPHONE ENCOUNTER
----- Message from Ashleybertram Aguirre sent at 12/16/2019 10:31 AM CST -----  Contact: Wife  Type: Needs Medical Advice    Who Called:      Best Call Back Number:   Additional Information: Requesting a call back from Nurse, regarding a refill called in [facility]  for   RX  lisinopril 10 MG Tab 90 tablet   PER PT REQUEST WOULD LIKE A  CALL FROM NURSE

## 2019-12-17 NOTE — PROGRESS NOTES
Refill Authorization Note     is requesting a refill authorization.    Brief assessment and rationale for refill: APPROVE: prr                                         Comments:   Requested Prescriptions   Signed Prescriptions Disp Refills    INV lisinopril 10 MG Tab 90 tablet 1     Sig: Take 1 tablet (10 mg total) by mouth once daily.       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, subsequent encounter    [facility] Orville Sierra MD    6 months ago Closed displaced fracture of neck of right radius, initial encounter    [facility] Orville Sierra MD    7 months ago Closed displaced fracture of neck of right radius, initial encounter    [facility] Eddie Cline MD    7 months ago Acute pain of right shoulder    [facility] Nixon Caldera Jr., MD          Future Appointments              In 2 months Nixon Caldera Jr., MD [facility]                Passed - Cr is 1.4 or below and within 360 days     Creatinine   Date Value Ref Range Status   05/21/2019 0.9 0.5 - 1.4 mg/dL Final   01/21/2019 0.8 0.5 - 1.4 mg/dL Final   04/05/2018 0.9 0.5 - 1.4 mg/dL Final              Passed - K in normal range and within 360 days     Potassium   Date Value Ref Range Status   05/21/2019 3.7 3.5 - 5.1 mmol/L Final   01/21/2019 3.8 3.5 - 5.1 mmol/L Final   04/05/2018 3.6 3.5 - 5.1 mmol/L Final               Passed - eGFR within 360 days     eGFR if non    Date Value Ref Range Status   05/21/2019 >60.0 >60 mL/min/1.73 m^2 Final     Comment:     Calculation used to obtain the estimated glomerular filtration  rate (eGFR) is the CKD-EPI equation.      01/21/2019 >60.0 >60 mL/min/1.73 m^2 Final     Comment:     Calculation used to obtain the estimated glomerular filtration  rate (eGFR) is the CKD-EPI equation.      04/05/2018 >60.0 >60 mL/min/1.73 m^2 Final     Comment:     Calculation used to obtain the estimated glomerular filtration  rate (eGFR) is the CKD-EPI equation.

## 2020-02-18 ENCOUNTER — OFFICE VISIT (OUTPATIENT)
Dept: FAMILY MEDICINE | Facility: CLINIC | Age: 85
End: 2020-02-18
Payer: MEDICARE

## 2020-02-18 ENCOUNTER — LAB VISIT (OUTPATIENT)
Dept: LAB | Facility: HOSPITAL | Age: 85
End: 2020-02-18
Attending: EMERGENCY MEDICINE
Payer: MEDICARE

## 2020-02-18 VITALS
WEIGHT: 141.31 LBS | DIASTOLIC BLOOD PRESSURE: 70 MMHG | HEART RATE: 60 BPM | SYSTOLIC BLOOD PRESSURE: 126 MMHG | RESPIRATION RATE: 16 BRPM | BODY MASS INDEX: 25.85 KG/M2

## 2020-02-18 DIAGNOSIS — I10 DIABETES MELLITUS WITH COINCIDENT HYPERTENSION: Chronic | ICD-10-CM

## 2020-02-18 DIAGNOSIS — F02.80 LATE ONSET ALZHEIMER'S DISEASE WITHOUT BEHAVIORAL DISTURBANCE: Chronic | ICD-10-CM

## 2020-02-18 DIAGNOSIS — Z85.46 HISTORY OF PROSTATE CANCER: Chronic | ICD-10-CM

## 2020-02-18 DIAGNOSIS — E78.2 MIXED HYPERLIPIDEMIA: Chronic | ICD-10-CM

## 2020-02-18 DIAGNOSIS — I10 ESSENTIAL HYPERTENSION: Chronic | ICD-10-CM

## 2020-02-18 DIAGNOSIS — E11.9 DIABETES MELLITUS WITH COINCIDENT HYPERTENSION: Chronic | ICD-10-CM

## 2020-02-18 DIAGNOSIS — R32 URINARY INCONTINENCE, UNSPECIFIED TYPE: Chronic | ICD-10-CM

## 2020-02-18 DIAGNOSIS — L30.9 DERMATITIS OF FOOT: ICD-10-CM

## 2020-02-18 DIAGNOSIS — E11.9 DIABETES MELLITUS WITH COINCIDENT HYPERTENSION: Primary | Chronic | ICD-10-CM

## 2020-02-18 DIAGNOSIS — G30.1 LATE ONSET ALZHEIMER'S DISEASE WITHOUT BEHAVIORAL DISTURBANCE: Chronic | ICD-10-CM

## 2020-02-18 DIAGNOSIS — I10 DIABETES MELLITUS WITH COINCIDENT HYPERTENSION: Primary | Chronic | ICD-10-CM

## 2020-02-18 LAB
ALBUMIN SERPL BCP-MCNC: 4.2 G/DL (ref 3.5–5.2)
ALP SERPL-CCNC: 78 U/L (ref 55–135)
ALT SERPL W/O P-5'-P-CCNC: 34 U/L (ref 10–44)
ANION GAP SERPL CALC-SCNC: 17 MMOL/L (ref 8–16)
AST SERPL-CCNC: 30 U/L (ref 10–40)
BILIRUB SERPL-MCNC: 0.7 MG/DL (ref 0.1–1)
BUN SERPL-MCNC: 17 MG/DL (ref 8–23)
CALCIUM SERPL-MCNC: 9.6 MG/DL (ref 8.7–10.5)
CHLORIDE SERPL-SCNC: 102 MMOL/L (ref 95–110)
CHOLEST SERPL-MCNC: 155 MG/DL (ref 120–199)
CHOLEST/HDLC SERPL: 4.3 {RATIO} (ref 2–5)
CO2 SERPL-SCNC: 23 MMOL/L (ref 23–29)
CREAT SERPL-MCNC: 0.9 MG/DL (ref 0.5–1.4)
EST. GFR  (AFRICAN AMERICAN): >60 ML/MIN/1.73 M^2
EST. GFR  (NON AFRICAN AMERICAN): >60 ML/MIN/1.73 M^2
GLUCOSE SERPL-MCNC: 131 MG/DL (ref 70–110)
HDLC SERPL-MCNC: 36 MG/DL (ref 40–75)
HDLC SERPL: 23.2 % (ref 20–50)
LDLC SERPL CALC-MCNC: 89.8 MG/DL (ref 63–159)
NONHDLC SERPL-MCNC: 119 MG/DL
POTASSIUM SERPL-SCNC: 3.3 MMOL/L (ref 3.5–5.1)
PROT SERPL-MCNC: 7.8 G/DL (ref 6–8.4)
SODIUM SERPL-SCNC: 142 MMOL/L (ref 136–145)
TRIGL SERPL-MCNC: 146 MG/DL (ref 30–150)
TSH SERPL DL<=0.005 MIU/L-ACNC: 1.54 UIU/ML (ref 0.4–4)

## 2020-02-18 PROCEDURE — 83036 HEMOGLOBIN GLYCOSYLATED A1C: CPT | Mod: HCNC

## 2020-02-18 PROCEDURE — 99999 PR PBB SHADOW E&M-EST. PATIENT-LVL III: CPT | Mod: PBBFAC,HCNC,, | Performed by: EMERGENCY MEDICINE

## 2020-02-18 PROCEDURE — 80053 COMPREHEN METABOLIC PANEL: CPT | Mod: HCNC

## 2020-02-18 PROCEDURE — 84443 ASSAY THYROID STIM HORMONE: CPT | Mod: HCNC

## 2020-02-18 PROCEDURE — 99999 PR PBB SHADOW E&M-EST. PATIENT-LVL III: ICD-10-PCS | Mod: PBBFAC,HCNC,, | Performed by: EMERGENCY MEDICINE

## 2020-02-18 PROCEDURE — 99499 UNLISTED E&M SERVICE: CPT | Mod: S$GLB,,, | Performed by: EMERGENCY MEDICINE

## 2020-02-18 PROCEDURE — 80061 LIPID PANEL: CPT | Mod: HCNC

## 2020-02-18 PROCEDURE — 99499 RISK ADDL DX/OHS AUDIT: ICD-10-PCS | Mod: S$GLB,,, | Performed by: EMERGENCY MEDICINE

## 2020-02-18 PROCEDURE — 1101F PR PT FALLS ASSESS DOC 0-1 FALLS W/OUT INJ PAST YR: ICD-10-PCS | Mod: HCNC,CPTII,S$GLB, | Performed by: EMERGENCY MEDICINE

## 2020-02-18 PROCEDURE — 1159F PR MEDICATION LIST DOCUMENTED IN MEDICAL RECORD: ICD-10-PCS | Mod: HCNC,S$GLB,, | Performed by: EMERGENCY MEDICINE

## 2020-02-18 PROCEDURE — 1101F PT FALLS ASSESS-DOCD LE1/YR: CPT | Mod: HCNC,CPTII,S$GLB, | Performed by: EMERGENCY MEDICINE

## 2020-02-18 PROCEDURE — 1126F AMNT PAIN NOTED NONE PRSNT: CPT | Mod: HCNC,S$GLB,, | Performed by: EMERGENCY MEDICINE

## 2020-02-18 PROCEDURE — 99214 OFFICE O/P EST MOD 30 MIN: CPT | Mod: HCNC,S$GLB,, | Performed by: EMERGENCY MEDICINE

## 2020-02-18 PROCEDURE — 99214 PR OFFICE/OUTPT VISIT, EST, LEVL IV, 30-39 MIN: ICD-10-PCS | Mod: HCNC,S$GLB,, | Performed by: EMERGENCY MEDICINE

## 2020-02-18 PROCEDURE — 1159F MED LIST DOCD IN RCRD: CPT | Mod: HCNC,S$GLB,, | Performed by: EMERGENCY MEDICINE

## 2020-02-18 PROCEDURE — 36415 COLL VENOUS BLD VENIPUNCTURE: CPT | Mod: HCNC,PO

## 2020-02-18 PROCEDURE — 1126F PR PAIN SEVERITY QUANTIFIED, NO PAIN PRESENT: ICD-10-PCS | Mod: HCNC,S$GLB,, | Performed by: EMERGENCY MEDICINE

## 2020-02-18 PROCEDURE — 3052F PR MOST RECENT HEMOGLOBIN A1C LEVEL 8.0 - < 9.0%: ICD-10-PCS | Mod: HCNC,CPTII,S$GLB, | Performed by: EMERGENCY MEDICINE

## 2020-02-18 PROCEDURE — 3052F HG A1C>EQUAL 8.0%<EQUAL 9.0%: CPT | Mod: HCNC,CPTII,S$GLB, | Performed by: EMERGENCY MEDICINE

## 2020-02-18 RX ORDER — HYDROCORTISONE 25 MG/G
OINTMENT TOPICAL 2 TIMES DAILY
Qty: 20 G | Refills: 2 | Status: SHIPPED | OUTPATIENT
Start: 2020-02-18 | End: 2021-03-22 | Stop reason: SDUPTHER

## 2020-02-18 NOTE — PROGRESS NOTES
Subjective:   THIS NOTE IS DONE WITH VOICE RECOGNITION        Patient ID: Darnell Knott is a 87 y.o. male.    Chief Complaint: Diabetes (follow up)      HPI     He is in to follow-up on numerous medical conditions.  His general well-being has been excellent.  He continues to live at home with his wife and his son.  He is not driving.    His wife mentions that he is having more belching since stopping his proton pump inhibition.  He is not having any pain. He has no emesis.  He is not having melena or hematochezia.  He does not appear to be uncomfortable.    Here to follow up on blood pressure.  Taking current medications.  He has not seen had syncope.  His very inactive, and his wife has not noticed any change in activity level other than he is sleeping more.    BP Readings from Last 3 Encounters:   02/18/20 126/70   10/10/19 130/67   07/17/19 (!) 153/68     Memory continues to decline.  Toileting is well controlled.  He does have urinary incontinence.   No recent diarrhea.  Not taking any Imodium.  As noted above, he is living at home with support from his son as well as his wife.  No delusions or hallucinations.  No memory issues.    Diabetes control is acceptable.  Love is having trouble with getting blood for capillary glucose.    Lab Results   Component Value Date    HGBA1C 7.7 (H) 10/10/2019    HGBA1C 8.4 (H) 05/21/2019    HGBA1C 8.7 (H) 01/21/2019       Lab Results   Component Value Date    LDLCALC 92.8 01/21/2019    LDLCALC 102.8 04/05/2018    LDLCALC 108.0 04/18/2017       Lab Results   Component Value Date    CREATININE 0.9 05/21/2019    CREATININE 0.8 01/21/2019    CREATININE 0.9 04/05/2018       Lab Results   Component Value Date    EGFRNONAA >60.0 05/21/2019    EGFRNONAA >60.0 01/21/2019    EGFRNONAA >60.0 04/05/2018     His wife does mention that he has a rash on his posterior heel.  This responded well to topical steroids in the past.  Immunization History   Administered Date(s) Administered     Influenza - High Dose - PF (65 years and older) 12/14/2012, 09/24/2013, 10/03/2014, 10/27/2015, 11/22/2016, 10/19/2017, 10/09/2018, 10/10/2019    Influenza A (H1N1) 2009 Monovalent - IM 02/02/2010    Influenza Split 11/19/2009, 10/26/2010, 09/17/2011    Pneumococcal Conjugate - 13 Valent 06/24/2004, 06/24/2004, 02/24/2015    Pneumococcal Polysaccharide - 23 Valent 04/27/2016         Current Outpatient Medications   Medication Sig Dispense Refill    acetaminophen (TYLENOL) 325 MG tablet Take 325 mg by mouth every 6 (six) hours as needed for Pain.      amLODIPine (NORVASC) 10 MG tablet Take 1 tablet (10 mg total) by mouth once daily. 90 tablet 3    aspirin 81 mg Tab 1 Tablet(s) Oral PRN Every day.        atorvastatin (LIPITOR) 40 MG tablet Take 1 tablet (40 mg total) by mouth once daily. 90 tablet 3    blood sugar diagnostic Strp use  test strips and lancets covered by insurance  two times a day. DX: E11.9 200 strip 3    donepezil (ARICEPT) 5 MG tablet Take 1 tablet (5 mg total) by mouth every evening. 90 tablet 3    glimepiride (AMARYL) 4 MG tablet Take 1.5 tablets (6 mg total) by mouth daily with breakfast. 135 tablet 3    INV lisinopril 10 MG Tab Take 1 tablet (10 mg total) by mouth once daily. 90 tablet 1    loperamide (IMODIUM) 2 mg capsule Take 2 mg by mouth 4 (four) times daily as needed for Diarrhea.      diabetic supplies, [employer]. Kit 1 Device by Misc.(Non-Drug; Combo Route) route once daily. Dx E11.9  Use diabetic test kit covered by insurance. Use two times a day. 1 kit 1    omeprazole (PRILOSEC) 20 MG capsule Take 1 capsule (20 mg total) by mouth once daily. (Patient not taking: Reported on 2/18/2020) 90 capsule 3     No current facility-administered medications for this visit.          Review of Systems   Unable to perform ROS: Dementia       Objective:      Physical Exam   Constitutional: He is oriented to person, place, and time. He appears well-developed and well-nourished. No  distress.   HENT:   Head: Normocephalic and atraumatic.   Right Ear: External ear normal.   Left Ear: External ear normal.   Nose: Nose normal.   Mouth/Throat: Oropharynx is clear and moist. No oropharyngeal exudate.   Eyes: Pupils are equal, round, and reactive to light. Conjunctivae and EOM are normal. No scleral icterus.   Neck: Normal range of motion. Neck supple. No thyromegaly present.   Cardiovascular: Normal rate, regular rhythm, normal heart sounds and intact distal pulses.   Pulses:       Dorsalis pedis pulses are 2+ on the right side, and 2+ on the left side.        Posterior tibial pulses are 2+ on the right side, and 2+ on the left side.   Pulmonary/Chest: Effort normal and breath sounds normal. He has no wheezes. He has no rales.   Abdominal: Soft. Bowel sounds are normal. He exhibits no distension. There is no tenderness.   Musculoskeletal: Normal range of motion. He exhibits no edema or tenderness.        Right foot: There is no deformity.        Left foot: There is no deformity.   Feet:   Right Foot:   Protective Sensation: 5 sites tested. 5 sites sensed.   Skin Integrity: Negative for skin breakdown.   Left Foot:   Protective Sensation: 5 sites tested. 5 sites sensed.   Skin Integrity: Negative for skin breakdown.   Lymphadenopathy:     He has no cervical adenopathy.   Neurological: He is alert and oriented to person, place, and time. He has normal reflexes. He exhibits normal muscle tone. Coordination normal.   Skin: Skin is warm and dry. No rash noted.        Psychiatric: He has a normal mood and affect. His speech is normal. Judgment normal. His mood appears not anxious. His affect is not angry. He is slowed. He does not exhibit a depressed mood. He exhibits abnormal recent memory and abnormal remote memory.   Typical advancement of Alzheimer's like dementia.   Vitals reviewed.      Assessment:       1. Diabetes mellitus with coincident hypertension    2. Mixed hyperlipidemia    3. Late onset  Alzheimer's disease without behavioral disturbance    4. Essential hypertension    5. History of prostate cancer    6. Urinary incontinence, unspecified type    7. Dermatitis of foot        Plan:       1. Diabetes mellitus with coincident hypertension  Acceptable control  Perfect a had ever Dr. Arambula great pictures of I agree with you had put him on some phone  - Hemoglobin A1c; Future  - Comprehensive metabolic panel; Future    2. Mixed hyperlipidemia  Lipids controlled  Continue current medications  Annual lipid profile and comprehensive metabolic panel  If complications developed such as myalgia or arthralgia, contact me.  - Comprehensive metabolic panel; Future  - TSH; Future  - Lipid panel; Future    3. Late onset Alzheimer's disease without behavioral disturbance  Gradual progression  Strong social fabric  No change in medications.    4. Essential hypertension  Controlled.  Continue current medications  Continue to avoid excessive sodium  Continue home monitoring  Target blood pressure is 139/89 or less    5. History of prostate cancer  Stable, without evidence of any recurrence  Additional intervention not anticipated    6. Urinary incontinence, unspecified type  Poor response to pharmacological intervention  Continue barrier methods    7. Dermatitis of foot  Recurrence  Possibly secondary to patient induced scratching    - hydrocortisone 2.5 % ointment; Apply topically 2 (two) times daily.  Dispense: 20 g; Refill: 2

## 2020-02-18 NOTE — PATIENT INSTRUCTIONS
Love,    Please stop doing the finger sticks.    Blood work today to check diabetes, thyroid, and cholesterol.    I did change the skin cream.  If is does not work let me know.    Relious is doing well.  His memory is slowly getting worse.    Nixon Caldera MD

## 2020-02-19 LAB
ESTIMATED AVG GLUCOSE: 186 MG/DL (ref 68–131)
HBA1C MFR BLD HPLC: 8.1 % (ref 4–5.6)

## 2020-02-21 ENCOUNTER — OFFICE VISIT (OUTPATIENT)
Dept: FAMILY MEDICINE | Facility: CLINIC | Age: 85
End: 2020-02-21
Payer: MEDICARE

## 2020-02-21 VITALS
DIASTOLIC BLOOD PRESSURE: 62 MMHG | HEIGHT: 62 IN | BODY MASS INDEX: 26.33 KG/M2 | SYSTOLIC BLOOD PRESSURE: 138 MMHG | WEIGHT: 143.06 LBS | OXYGEN SATURATION: 98 % | HEART RATE: 54 BPM

## 2020-02-21 DIAGNOSIS — Z00.00 ENCOUNTER FOR PREVENTIVE HEALTH EXAMINATION: Primary | ICD-10-CM

## 2020-02-21 DIAGNOSIS — I10 ESSENTIAL HYPERTENSION: Chronic | ICD-10-CM

## 2020-02-21 DIAGNOSIS — E11.9 DIABETES MELLITUS WITH COINCIDENT HYPERTENSION: Chronic | ICD-10-CM

## 2020-02-21 DIAGNOSIS — I10 DIABETES MELLITUS WITH COINCIDENT HYPERTENSION: Chronic | ICD-10-CM

## 2020-02-21 DIAGNOSIS — R26.9 ABNORMALITY OF GAIT AND MOBILITY: ICD-10-CM

## 2020-02-21 DIAGNOSIS — E78.2 MIXED HYPERLIPIDEMIA: Chronic | ICD-10-CM

## 2020-02-21 DIAGNOSIS — G30.1 LATE ONSET ALZHEIMER'S DISEASE WITHOUT BEHAVIORAL DISTURBANCE: Chronic | ICD-10-CM

## 2020-02-21 DIAGNOSIS — F02.80 LATE ONSET ALZHEIMER'S DISEASE WITHOUT BEHAVIORAL DISTURBANCE: Chronic | ICD-10-CM

## 2020-02-21 PROCEDURE — G0439 PR MEDICARE ANNUAL WELLNESS SUBSEQUENT VISIT: ICD-10-PCS | Mod: HCNC,S$GLB,, | Performed by: NURSE PRACTITIONER

## 2020-02-21 PROCEDURE — 99999 PR PBB SHADOW E&M-EST. PATIENT-LVL IV: ICD-10-PCS | Mod: PBBFAC,HCNC,, | Performed by: NURSE PRACTITIONER

## 2020-02-21 PROCEDURE — 3052F HG A1C>EQUAL 8.0%<EQUAL 9.0%: CPT | Mod: HCNC,CPTII,S$GLB, | Performed by: NURSE PRACTITIONER

## 2020-02-21 PROCEDURE — G0439 PPPS, SUBSEQ VISIT: HCPCS | Mod: HCNC,S$GLB,, | Performed by: NURSE PRACTITIONER

## 2020-02-21 PROCEDURE — 3052F PR MOST RECENT HEMOGLOBIN A1C LEVEL 8.0 - < 9.0%: ICD-10-PCS | Mod: HCNC,CPTII,S$GLB, | Performed by: NURSE PRACTITIONER

## 2020-02-21 PROCEDURE — 99999 PR PBB SHADOW E&M-EST. PATIENT-LVL IV: CPT | Mod: PBBFAC,HCNC,, | Performed by: NURSE PRACTITIONER

## 2020-02-21 NOTE — PATIENT INSTRUCTIONS
Counseling and Referral of Other Preventative  (Italic type indicates deductible and co-insurance are waived)    Patient Name: Darnell Knott  Today's Date: 2/21/2020    Health Maintenance       Date Due Completion Date    TETANUS VACCINE 09/23/1950 ---    Shingles Vaccine (1 of 2) 09/23/1982 ---    Eye Exam 03/15/2019 3/15/2018    Hemoglobin A1c 08/18/2020 2/18/2020    Foot Exam 02/18/2021 2/18/2020    Lipid Panel 02/18/2021 2/18/2020        No orders of the defined types were placed in this encounter.    The following information is provided to all patients.  This information is to help you find resources for any of the problems found today that may be affecting your health:                Living healthy guide: www.[facility].[place].gov      Understanding Diabetes: www.diabetes.org      Eating healthy: www.cdc.gov/healthyweight      [facility] home safety checklist: www.cdc.gov/steadi/patient.html      Agency on Aging: www.goea.[place].[facility]      Alcoholics anonymous (AA): www.aa.org      Physical Activity: www.aj.nih.gov/vg9mwsn      Tobacco use: www.quitwithusla.org

## 2020-02-21 NOTE — PROGRESS NOTES
"Darnell Knott presented for a  Medicare AWV and comprehensive Health Risk Assessment today. The following components were reviewed and updated:    · Medical history  · Family History  · Social history  · Allergies and Current Medications  · Health Risk Assessment  · Health Maintenance  · Care Team     ** See Completed Assessments for Annual Wellness Visit within the encounter summary.**     The following assessments were completed:  · Living Situation  · CAGE  · Depression Screening  · Timed Get Up and Go  · Whisper Test  · Cognitive Function Screening- N/A  · Nutrition Screening  · ADL Screening  · PAQ Screening    Vitals:    02/21/20 1208   BP: 138/62   BP Location: Left arm   Patient Position: Sitting   BP Method: Medium (Manual)   Pulse: (!) 54   SpO2: 98%   Weight: 64.9 kg (143 lb 1.3 oz)   Height: 5' 2" (1.575 m)     Body mass index is 26.17 kg/m².  Physical Exam   Constitutional: He is oriented to person, place, and time. He appears well-nourished.   Cardiovascular: Normal rate, regular rhythm, normal heart sounds and intact distal pulses.   Pulmonary/Chest: Effort normal and breath sounds normal.   Neurological: He is alert and oriented to person, place, and time.   Skin: Skin is warm and dry.   Vitals reviewed.      Diagnoses and health risks identified today and associated recommendations/orders:    1. Encounter for preventive health examination  Reviewed and discussed health maintenance.      2. Abnormality of gait and mobility  Safety issues discussed and reviewed    3. Essential hypertension  Stable- continue current treatment and follow up routinely with PCP     4. Mixed hyperlipidemia  Stable- continue current treatment and follow up routinely with PCP     5. Diabetes mellitus with coincident hypertension  Stable- continue current treatment and follow up routinely with PCP   A1c reviewed     6. Late onset Alzheimer's disease without behavioral disturbance  Stable- continue current treatment and " follow up routinely with PCP     Provided Relious with a 5-10 year written screening schedule and personal prevention plan. Recommendations were developed using the USPSTF age appropriate recommendations. Education, counseling, and referrals were provided as needed. After Visit Summary printed and given to patient which includes a list of additional screenings\tests needed.    Patricia Claire NP

## 2020-05-20 DIAGNOSIS — E11.9 TYPE 2 DIABETES MELLITUS WITHOUT COMPLICATION, WITHOUT LONG-TERM CURRENT USE OF INSULIN: ICD-10-CM

## 2020-05-20 DIAGNOSIS — E78.5 HYPERLIPIDEMIA, UNSPECIFIED HYPERLIPIDEMIA TYPE: ICD-10-CM

## 2020-05-20 DIAGNOSIS — F03.90 DEMENTIA WITHOUT BEHAVIORAL DISTURBANCE, UNSPECIFIED DEMENTIA TYPE: Chronic | ICD-10-CM

## 2020-05-20 DIAGNOSIS — I10 ESSENTIAL HYPERTENSION: Chronic | ICD-10-CM

## 2020-05-20 RX ORDER — ATORVASTATIN CALCIUM 40 MG/1
40 TABLET, FILM COATED ORAL DAILY
Qty: 90 TABLET | Refills: 3 | Status: SHIPPED | OUTPATIENT
Start: 2020-05-20 | End: 2021-02-22 | Stop reason: SDUPTHER

## 2020-05-20 RX ORDER — AMLODIPINE BESYLATE 10 MG/1
10 TABLET ORAL DAILY
Qty: 90 TABLET | Refills: 3 | Status: SHIPPED | OUTPATIENT
Start: 2020-05-20 | End: 2021-02-22 | Stop reason: SDUPTHER

## 2020-05-20 RX ORDER — DONEPEZIL HYDROCHLORIDE 5 MG/1
5 TABLET, FILM COATED ORAL NIGHTLY
Qty: 90 TABLET | Refills: 3 | Status: SHIPPED | OUTPATIENT
Start: 2020-05-20 | End: 2021-02-22 | Stop reason: SDUPTHER

## 2020-05-20 RX ORDER — GLIMEPIRIDE 4 MG/1
6 TABLET ORAL
Qty: 135 TABLET | Refills: 0 | Status: SHIPPED | OUTPATIENT
Start: 2020-05-20 | End: 2020-08-05 | Stop reason: SDUPTHER

## 2020-05-20 NOTE — PROGRESS NOTES
Refill Routing Note    Medication(s) are not appropriate for processing by [facility]:       Outside of protocol and Required laboratory values are abnormal        Medication Therapy Plan: K low- defer lisinopril to you; approve 3 more on glimepiride-A1C > 7.9; 12 more on amlodipine and atorvastatin  Medication reconciliation completed: No      Automatic Epic Protocol Generated Data:    Requested Prescriptions   Pending Prescriptions Disp Refills    INV lisinopril 10 MG Tab 90 tablet 3     Sig: Take 1 tablet (10 mg total) by mouth once daily.       Cardiovascular:  ACE Inhibitors Failed - 5/20/2020  9:40 AM        Failed - K in normal range and within 360 days     Potassium   Date Value Ref Range Status   02/18/2020 3.3 (L) 3.5 - 5.1 mmol/L Final   05/21/2019 3.7 3.5 - 5.1 mmol/L Final   01/21/2019 3.8 3.5 - 5.1 mmol/L Final              Passed - Patient is at least 18 years old        Passed - Last BP in normal range within 360 days.     BP Readings from Last 3 Encounters:   02/21/20 138/62   02/18/20 126/70   10/10/19 130/67              Passed - Office visit in past 12 months or future 90 days.     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, subsequent encounter    [facility] [place]    11 months ago Closed displaced fracture of neck of right radius, initial encounter    [facility] Sierra MD          Future Appointments              In 1 month Nixon Caldera Jr., MD [facility]                 Passed - Cr is 1.3 or below and within 360 days     Creatinine   Date Value Ref Range Status   02/18/2020 0.9 0.5 - 1.4 mg/dL Final   05/21/2019 0.9 0.5 - 1.4 mg/dL Final   01/21/2019 0.8 0.5 - 1.4 mg/dL Final              Passed - eGFR within 360 days     eGFR if non    Date Value Ref Range Status   02/18/2020 >60.0 >60 mL/min/1.73 m^2 Final     Comment:     Calculation used to obtain the estimated glomerular filtration  rate (eGFR) is the CKD-EPI equation.      05/21/2019 >60.0 >60 mL/min/1.73 m^2 Final     Comment:     Calculation used to obtain the estimated glomerular filtration  rate (eGFR) is the CKD-EPI equation.      01/21/2019 >60.0 >60 mL/min/1.73 m^2 Final     Comment:     Calculation used to obtain the estimated glomerular filtration  rate (eGFR) is the CKD-EPI equation.        eGFR if    Date Value Ref Range Status   02/18/2020 >60.0 >60 mL/min/1.73 m^2 Final   05/21/2019 >60.0 >60 mL/min/1.73 m^2 Final   01/21/2019 >60.0 >60 mL/min/1.73 m^2 Final             donepeziL (ARICEPT) 5 MG tablet 90 tablet 3     Sig: Take 1 tablet (5 mg total) by mouth every evening.       There is no refill protocol information for this order      Signed Prescriptions Disp Refills    amLODIPine (NORVASC) 10 MG tablet 90 tablet 3     Sig: Take 1 tablet (10 mg total) by mouth once daily.       Cardiovascular:  Calcium Channel Blockers Passed - 5/20/2020  9:40 AM        Passed - Patient is at least 18 years old        Passed - Last BP in normal range within 360 days.     BP Readings from Last 3 Encounters:   02/21/20 138/62   02/18/20 126/70   10/10/19 130/67              Passed - Office visit in past 12 months or future 90 days.     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       Cardiovascular:  Antilipid - Statins Passed - 5/20/2020  9:40 AM        Passed - Patient is at least 18 years old        Passed - Office visit in past 12 months or future 90 days.     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, subsequent encounter    [facility]    11 months ago Closed displaced fracture of neck of right radius, initial encounter    [facility]          Future Appointments              In 1 month Nixon Caldera Jr., MD [facility]                Passed - Lipid Panel completed in last 360 days     Lab Results   Component Value Date    CHOL 155 02/18/2020    HDL 36 (L) 02/18/2020    LDLCALC 89.8 02/18/2020    TRIG 146 02/18/2020             Passed - ALT is 94 or below and within 360 days     ALT   Date Value Ref Range Status    02/18/2020 34 10 - 44 U/L Final   01/21/2019 58 (H) 10 - 44 U/L Final   04/05/2018 52 (H) 10 - 44 U/L Final              Passed - AST is 54 or below and within 360 days     AST   Date Value Ref Range Status   02/18/2020 30 10 - 40 U/L Final   01/21/2019 42 (H) 10 - 40 U/L Final   04/05/2018 54 (H) 10 - 40 U/L Final             glimepiride (AMARYL) 4 MG tablet 135 tablet 0     Sig: Take 1.5 tablets (6 mg total) by mouth daily with breakfast.       Endocrinology:  Diabetes - Sulfonylureas Failed - 5/20/2020  9:40 AM        Failed - HBA1C is 7.9 or below and within 180 days     Hemoglobin A1C   Date Value Ref Range Status   02/18/2020 8.1 (H) 4.0 - 5.6 % Final     Comment:     ADA Screening Guidelines:  5.7-6.4%  Consistent with prediabetes  >or=6.5%  Consistent with diabetes  High levels of fetal hemoglobin interfere with the HbA1C  assay. Heterozygous hemoglobin variants (HbS, HgC, etc)do  not significantly interfere with this assay.   However, presence of multiple variants may affect accuracy.     10/10/2019 7.7 (H) 4.0 - 5.6 % Final     Comment:     ADA Screening Guidelines:  5.7-6.4%  Consistent with prediabetes  >or=6.5%  Consistent with diabetes  High levels of fetal hemoglobin interfere with the HbA1C  assay. Heterozygous hemoglobin variants (HbS, HgC, etc)do  not significantly interfere with this assay.   However, presence of multiple variants may affect accuracy.     05/21/2019 8.4 (H) 4.0 - 5.6 % Final     Comment:     ADA Screening Guidelines:  5.7-6.4%  Consistent with prediabetes  >or=6.5%  Consistent with diabetes  High levels of fetal hemoglobin interfere with the HbA1C  assay. Heterozygous hemoglobin variants (HbS, HgC, etc)do  not significantly interfere with this assay.   However, presence of multiple variants may affect accuracy.                Passed - Patient is at least 18 years old        Passed - Office visit in past 12 months or future 90 days.     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, subsequent encounter    [facility] MD    11 months ago Closed displaced fracture of neck of right radius, initial encounter    [facility]          Future Appointments              In 1 month Nixon Caldera Jr., MD [facility]                Passed - Cr is 1.3 or below and within 360 days     Creatinine   Date Value Ref Range Status   02/18/2020 0.9 0.5 - 1.4 mg/dL Final   05/21/2019 0.9 0.5 - 1.4 mg/dL Final   01/21/2019 0.8 0.5 - 1.4 mg/dL Final              Passed - eGFR is 30 or above and within 360 days     eGFR if non    Date Value Ref Range Status   02/18/2020 >60.0 >60 mL/min/1.73 m^2 Final     Comment:     Calculation used to obtain the estimated glomerular filtration  rate (eGFR) is the CKD-EPI equation.      05/21/2019 >60.0 >60 mL/min/1.73 m^2 Final     Comment:     Calculation used to obtain the estimated glomerular filtration  rate (eGFR) is the CKD-EPI equation.      01/21/2019 >60.0 >60 mL/min/1.73 m^2 Final     Comment:     Calculation used to obtain the estimated glomerular filtration  rate (eGFR) is the CKD-EPI equation.        eGFR if    Date Value Ref Range Status   02/18/2020 >60.0 >60 mL/min/1.73 m^2 Final   05/21/2019 >60.0 >60 mL/min/1.73 m^2 Final   01/21/2019 >60.0 >60 mL/min/1.73 m^2 Final                 Appointments  past 12m or future 3m with PCP    Date Provider   Last Visit   2/18/2020 Nixon Caldera Jr., MD   Next Visit    6/22/2020 Nixon Caldera Jr., MD   ED visits in past 90 days: 0     Note composed:3:14 PM 05/20/2020

## 2020-05-20 NOTE — TELEPHONE ENCOUNTER
----- Message from Juan J Frias sent at 5/20/2020  9:32 AM CDT -----  Contact: Love Knott (Spouse)  Type:  RX Refill Request    Who Called:  Love  Refill or New Rx:  Refill  RX Name and Strength:    1. INV lisinopril 10 MG Tab  2. amLODIPine (NORVASC) 10 MG tablet  3. atorvastatin (LIPITOR) 40 MG tablet  4. glimepiride (AMARYL) 4 MG tablet  5. donepezil (ARICEPT) 5 MG tablet  How is the patient currently taking it? (ex. 1XDay):  As ordered  Is this a 30 day or 90 day RX:  90  Preferred Pharmacy with phone number:    [facility] - [place], [place] - [address]  [address] [address]  Phone: [phone] Fax: [phone]  Local or Mail Order:  Mail  Ordering Provider:  Dr. Werner Bailey Call Back Number:  [phone]  Additional Information:  NA

## 2020-06-14 NOTE — TELEPHONE ENCOUNTER
----- Message from RT Dora sent at 7/8/2019  9:19 AM CDT -----  Contact: Pt   Pt , requesting Rx refills: Glimepiride 4mg, amlodipine 10 mg, Omeprazole 20 mg,Donepezil 5 mg, and atorvastatin  40 mg, please fax to [facility], please call to confirm, thanks.  
The patient is a 55y Male complaining of chest pain.

## 2020-06-22 ENCOUNTER — OFFICE VISIT (OUTPATIENT)
Dept: FAMILY MEDICINE | Facility: CLINIC | Age: 85
End: 2020-06-22
Payer: MEDICARE

## 2020-06-22 ENCOUNTER — LAB VISIT (OUTPATIENT)
Dept: LAB | Facility: HOSPITAL | Age: 85
End: 2020-06-22
Attending: EMERGENCY MEDICINE
Payer: MEDICARE

## 2020-06-22 VITALS
WEIGHT: 140.63 LBS | BODY MASS INDEX: 25.73 KG/M2 | SYSTOLIC BLOOD PRESSURE: 146 MMHG | HEART RATE: 52 BPM | DIASTOLIC BLOOD PRESSURE: 70 MMHG | OXYGEN SATURATION: 99 %

## 2020-06-22 DIAGNOSIS — I10 ESSENTIAL HYPERTENSION: Chronic | ICD-10-CM

## 2020-06-22 DIAGNOSIS — G30.1 LATE ONSET ALZHEIMER'S DISEASE WITHOUT BEHAVIORAL DISTURBANCE: Primary | Chronic | ICD-10-CM

## 2020-06-22 DIAGNOSIS — E11.9 DIABETES MELLITUS WITH COINCIDENT HYPERTENSION: Chronic | ICD-10-CM

## 2020-06-22 DIAGNOSIS — I10 DIABETES MELLITUS WITH COINCIDENT HYPERTENSION: Chronic | ICD-10-CM

## 2020-06-22 DIAGNOSIS — R32 URINARY INCONTINENCE, UNSPECIFIED TYPE: Chronic | ICD-10-CM

## 2020-06-22 DIAGNOSIS — Z85.46 HISTORY OF PROSTATE CANCER: Chronic | ICD-10-CM

## 2020-06-22 DIAGNOSIS — F02.80 LATE ONSET ALZHEIMER'S DISEASE WITHOUT BEHAVIORAL DISTURBANCE: Primary | Chronic | ICD-10-CM

## 2020-06-22 LAB
ALBUMIN SERPL BCP-MCNC: 4.2 G/DL (ref 3.5–5.2)
ALP SERPL-CCNC: 69 U/L (ref 55–135)
ALT SERPL W/O P-5'-P-CCNC: 26 U/L (ref 10–44)
ANION GAP SERPL CALC-SCNC: 13 MMOL/L (ref 8–16)
AST SERPL-CCNC: 25 U/L (ref 10–40)
BILIRUB SERPL-MCNC: 0.7 MG/DL (ref 0.1–1)
BUN SERPL-MCNC: 14 MG/DL (ref 8–23)
CALCIUM SERPL-MCNC: 9.3 MG/DL (ref 8.7–10.5)
CHLORIDE SERPL-SCNC: 104 MMOL/L (ref 95–110)
CO2 SERPL-SCNC: 24 MMOL/L (ref 23–29)
CREAT SERPL-MCNC: 0.8 MG/DL (ref 0.5–1.4)
EST. GFR  (AFRICAN AMERICAN): >60 ML/MIN/1.73 M^2
EST. GFR  (NON AFRICAN AMERICAN): >60 ML/MIN/1.73 M^2
GLUCOSE SERPL-MCNC: 116 MG/DL (ref 70–110)
POTASSIUM SERPL-SCNC: 3.8 MMOL/L (ref 3.5–5.1)
PROT SERPL-MCNC: 7.6 G/DL (ref 6–8.4)
SODIUM SERPL-SCNC: 141 MMOL/L (ref 136–145)

## 2020-06-22 PROCEDURE — 99499 UNLISTED E&M SERVICE: CPT | Mod: S$GLB,,, | Performed by: EMERGENCY MEDICINE

## 2020-06-22 PROCEDURE — 99213 PR OFFICE/OUTPT VISIT, EST, LEVL III, 20-29 MIN: ICD-10-PCS | Mod: HCNC,S$GLB,, | Performed by: EMERGENCY MEDICINE

## 2020-06-22 PROCEDURE — 36415 COLL VENOUS BLD VENIPUNCTURE: CPT | Mod: HCNC,PO

## 2020-06-22 PROCEDURE — 99499 RISK ADDL DX/OHS AUDIT: ICD-10-PCS | Mod: S$GLB,,, | Performed by: EMERGENCY MEDICINE

## 2020-06-22 PROCEDURE — 1101F PT FALLS ASSESS-DOCD LE1/YR: CPT | Mod: HCNC,CPTII,S$GLB, | Performed by: EMERGENCY MEDICINE

## 2020-06-22 PROCEDURE — 1126F PR PAIN SEVERITY QUANTIFIED, NO PAIN PRESENT: ICD-10-PCS | Mod: HCNC,S$GLB,, | Performed by: EMERGENCY MEDICINE

## 2020-06-22 PROCEDURE — 99213 OFFICE O/P EST LOW 20 MIN: CPT | Mod: HCNC,S$GLB,, | Performed by: EMERGENCY MEDICINE

## 2020-06-22 PROCEDURE — 83036 HEMOGLOBIN GLYCOSYLATED A1C: CPT | Mod: HCNC

## 2020-06-22 PROCEDURE — 1159F PR MEDICATION LIST DOCUMENTED IN MEDICAL RECORD: ICD-10-PCS | Mod: HCNC,S$GLB,, | Performed by: EMERGENCY MEDICINE

## 2020-06-22 PROCEDURE — 99999 PR PBB SHADOW E&M-EST. PATIENT-LVL III: ICD-10-PCS | Mod: PBBFAC,HCNC,, | Performed by: EMERGENCY MEDICINE

## 2020-06-22 PROCEDURE — 3051F PR MOST RECENT HEMOGLOBIN A1C LEVEL 7.0 - < 8.0%: ICD-10-PCS | Mod: HCNC,CPTII,S$GLB, | Performed by: EMERGENCY MEDICINE

## 2020-06-22 PROCEDURE — 3051F HG A1C>EQUAL 7.0%<8.0%: CPT | Mod: HCNC,CPTII,S$GLB, | Performed by: EMERGENCY MEDICINE

## 2020-06-22 PROCEDURE — 80053 COMPREHEN METABOLIC PANEL: CPT | Mod: HCNC

## 2020-06-22 PROCEDURE — 1101F PR PT FALLS ASSESS DOC 0-1 FALLS W/OUT INJ PAST YR: ICD-10-PCS | Mod: HCNC,CPTII,S$GLB, | Performed by: EMERGENCY MEDICINE

## 2020-06-22 PROCEDURE — 99999 PR PBB SHADOW E&M-EST. PATIENT-LVL III: CPT | Mod: PBBFAC,HCNC,, | Performed by: EMERGENCY MEDICINE

## 2020-06-22 PROCEDURE — 1159F MED LIST DOCD IN RCRD: CPT | Mod: HCNC,S$GLB,, | Performed by: EMERGENCY MEDICINE

## 2020-06-22 PROCEDURE — 1126F AMNT PAIN NOTED NONE PRSNT: CPT | Mod: HCNC,S$GLB,, | Performed by: EMERGENCY MEDICINE

## 2020-06-22 NOTE — PROGRESS NOTES
Subjective:   THIS NOTE IS DONE WITH VOICE RECOGNITION        Patient ID: Darnell Knott is a 87 y.o. male.    Chief Complaint: Follow-up      HPI     He is in to follow-up on numerous medical issues.    His dementia is slowly progressing.  His activity levels dropping.  He is no longer driving.  His wife and son or picking up all of the chores that he normally has around the.  He does very little decides watch television and come to the dinner table.    His activity is noted to his home.  He is no longer going to [employer] her other external activities.    Here to follow up on blood pressure.  Taking current medications.    BP Readings from Last 3 Encounters:   06/22/20 (!) 146/70   02/21/20 138/62   02/18/20 126/70     Continued issues with bowel and bladder control.  His wife is the caregiver.      He does have a history of prostate cancer.     Ref. Range 4/5/2018 12:37 10/10/2019 15:33   PSA DIAGNOSTIC Latest Ref Range: 0.00 - 4.00 ng/mL <0.01 <0.01       His gastroesophageal reflux disease is been controlled with relatively low-dose proton pump inhibitors.  No new symptoms.    Lab Results   Component Value Date    HGBA1C 7.9 (H) 06/22/2020    HGBA1C 8.1 (H) 02/18/2020    HGBA1C 7.7 (H) 10/10/2019     Lab Results   Component Value Date    LDLCALC 89.8 02/18/2020    LDLCALC 92.8 01/21/2019    LDLCALC 102.8 04/05/2018     Lab Results   Component Value Date    CREATININE 0.8 06/22/2020    CREATININE 0.9 02/18/2020    CREATININE 0.9 05/21/2019     Lab Results   Component Value Date    ESTGFRAFRICA >60.0 06/22/2020    ESTGFRAFRICA >60.0 02/18/2020    ESTGFRAFRICA >60.0 05/21/2019         Current Outpatient Medications   Medication Sig Dispense Refill    acetaminophen (TYLENOL) 325 MG tablet Take 325 mg by mouth every 6 (six) hours as needed for Pain.      amLODIPine (NORVASC) 10 MG tablet Take 1 tablet (10 mg total) by mouth once daily. 90 tablet 3    aspirin 81 mg Tab 1 Tablet(s) Oral PRN Every day.         atorvastatin (LIPITOR) 40 MG tablet Take 1 tablet (40 mg total) by mouth once daily. 90 tablet 3    blood sugar diagnostic Strp use  test strips and lancets covered by insurance  two times a day. DX: E11.9 200 strip 3    donepeziL (ARICEPT) 5 MG tablet Take 1 tablet (5 mg total) by mouth every evening. 90 tablet 3    glimepiride (AMARYL) 4 MG tablet Take 1.5 tablets (6 mg total) by mouth daily with breakfast. 135 tablet 0    INV lisinopril 10 MG Tab Take 1 tablet (10 mg total) by mouth once daily. 90 tablet 3    loperamide (IMODIUM) 2 mg capsule Take 2 mg by mouth 4 (four) times daily as needed for Diarrhea.      omeprazole (PRILOSEC) 20 MG capsule Take 1 capsule (20 mg total) by mouth once daily. 90 capsule 3    diabetic supplies, [employer]. Kit 1 Device by Misc.(Non-Drug; Combo Route) route once daily. Dx E11.9  Use diabetic test kit covered by insurance. Use two times a day. 1 kit 1    hydrocortisone 2.5 % ointment Apply topically 2 (two) times daily. (Patient not taking: Reported on 6/22/2020) 20 g 2     No current facility-administered medications for this visit.          Review of Systems   Constitutional: Negative for activity change, chills, fever and unexpected weight change.   HENT: Negative for hearing loss, nosebleeds and tinnitus.    Eyes: Negative for discharge and itching.   Respiratory: Negative for cough, choking, chest tightness, shortness of breath and wheezing.    Cardiovascular: Negative for chest pain, palpitations and leg swelling.   Gastrointestinal: Negative for abdominal distention.   Genitourinary: Negative for difficulty urinating, dysuria, flank pain, hematuria and urgency.   Musculoskeletal: Negative for arthralgias, back pain and joint swelling.   Neurological: Negative for dizziness, tremors, seizures, numbness and headaches.   Psychiatric/Behavioral: Negative for confusion, dysphoric mood and hallucinations.        Decreasing memory.  Asking questions repeatedly.        Objective:      Physical Exam  Vitals signs reviewed.   Constitutional:       General: He is not in acute distress.     Appearance: He is well-developed.   HENT:      Head: Normocephalic and atraumatic.      Right Ear: External ear normal.      Left Ear: External ear normal.      Nose: Nose normal.      Mouth/Throat:      Pharynx: No oropharyngeal exudate.   Eyes:      General: No scleral icterus.     Conjunctiva/sclera: Conjunctivae normal.      Pupils: Pupils are equal, round, and reactive to light.   Neck:      Musculoskeletal: Normal range of motion and neck supple.      Thyroid: No thyromegaly.   Cardiovascular:      Rate and Rhythm: Normal rate and regular rhythm.      Pulses:           Dorsalis pedis pulses are 2+ on the right side and 2+ on the left side.        Posterior tibial pulses are 2+ on the right side and 2+ on the left side.      Heart sounds: Normal heart sounds.   Pulmonary:      Effort: Pulmonary effort is normal.      Breath sounds: Normal breath sounds. No wheezing or rales.   Abdominal:      General: Bowel sounds are normal. There is no distension.      Palpations: Abdomen is soft.      Tenderness: There is no abdominal tenderness.   Musculoskeletal: Normal range of motion.         General: No tenderness.      Right foot: No deformity.      Left foot: No deformity.   Feet:      Right foot:      Protective Sensation: 5 sites tested. 5 sites sensed.      Skin integrity: No skin breakdown.      Left foot:      Protective Sensation: 5 sites tested. 5 sites sensed.      Skin integrity: No skin breakdown.   Lymphadenopathy:      Cervical: No cervical adenopathy.   Skin:     General: Skin is warm and dry.      Findings: No rash.   Neurological:      Mental Status: He is alert and oriented to person, place, and time.      Cranial Nerves: Cranial nerves are intact.      Sensory: Sensation is intact.      Motor: Tremor (right hand and arm) present. No abnormal muscle tone.      Coordination: Romberg  sign positive. Coordination normal.      Deep Tendon Reflexes: Reflexes are normal and symmetric.      Reflex Scores:       Tricep reflexes are 2+ on the right side and 2+ on the left side.       Bicep reflexes are 2+ on the right side and 2+ on the left side.       Brachioradialis reflexes are 2+ on the right side and 2+ on the left side.       Patellar reflexes are 2+ on the right side and 2+ on the left side.       Achilles reflexes are 2+ on the right side and 2+ on the left side.     Comments: As noted above, patient has increasing signs of dementia.  His interactions verbally are less frequent.  All short-term memory is gone.    He does not appear to be upset by the cognitive changes that he is experiencing.   Psychiatric:         Behavior: Behavior normal.         Assessment:       1. Late onset Alzheimer's disease without behavioral disturbance    2. Diabetes mellitus with coincident hypertension    3. Essential hypertension    4. History of prostate cancer    5. Urinary incontinence, unspecified type        Plan:       1. 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    2. Diabetes mellitus with coincident hypertension  Acceptable control  Update labs  Do not anticipating more complex regimen, unless absolutely necessary.    - Comprehensive metabolic panel; Future    3. Essential hypertension  Not controlled today  Additional blood pressure readings recommended.  No change in medications today.  - Comprehensive metabolic panel; Future    4. History of prostate cancer  Diagnostic PSAs have been consistently less than 0.01  Will update this with his next routine blood work, not the 1 today.    5. Urinary incontinence, unspecified type  Secondary to prostate cancer surgery  The burden this places on his caregiver, who is his wife, was discussed.  At this point she is not open to a new or other  ideas and had a deal with his incontinence.

## 2020-06-23 LAB
ESTIMATED AVG GLUCOSE: 180 MG/DL (ref 68–131)
HBA1C MFR BLD HPLC: 7.9 % (ref 4–5.6)

## 2020-06-29 NOTE — PATIENT INSTRUCTIONS
Relious,    We will check her diabetes control today.    As we discussed, I do think there is more involvement of your brain with Alzheimer's disease.    I would like to see you in 3-4 months.    No change in medications today.    Nixon Caldera MD

## 2020-08-05 DIAGNOSIS — E11.9 TYPE 2 DIABETES MELLITUS WITHOUT COMPLICATION, WITHOUT LONG-TERM CURRENT USE OF INSULIN: ICD-10-CM

## 2020-08-05 NOTE — TELEPHONE ENCOUNTER
----- Message from Keiyr Horn sent at 8/5/2020 10:10 AM CDT -----  Regarding: Refill Request  Contact: Pt's Wife  PT's wife called to get a refill on one of the PT's prescriptions     glimepiride (AMARYL) 4 MG tablet  Take 1.5 tablets (6 mg total) by mouth daily with breakfast. - Oral  135 tablet     [facility] - [place], [place] - [address]  [address] [address]  Phone: [phone] Fax: [phone]    Callback: [phone]

## 2020-08-05 NOTE — TELEPHONE ENCOUNTER
No new care gaps identified.  Powered by [employer]. Reference number: [id number]. 8/05/2020 10:27:35 AM   [facility]ERC[facility]

## 2020-08-07 RX ORDER — GLIMEPIRIDE 4 MG/1
6 TABLET ORAL
Qty: 135 TABLET | Refills: 1 | Status: SHIPPED | OUTPATIENT
Start: 2020-08-07 | End: 2021-01-05 | Stop reason: SDUPTHER

## 2020-08-07 NOTE — PROGRESS NOTES
Refill Authorization Note    is requesting a refill authorization.    Brief assessment and rationale for refill: APPROVE: prr          Medication Therapy Plan: CDMR. Emmie 6 more    Medication reconciliation completed: No                         Comments:      Orders Placed This Encounter    glimepiride (AMARYL) 4 MG tablet      Requested Prescriptions   Signed Prescriptions Disp Refills    glimepiride (AMARYL) 4 MG tablet 135 tablet 1     Sig: Take 1.5 tablets (6 mg total) by mouth daily with breakfast.       Endocrinology:  Diabetes - Sulfonylureas Passed - 8/5/2020  1:09 PM        Passed - Patient is at least 18 years old        Passed - Office visit in past 12 months or future 90 days.     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 Heterozygous hemoglobin variants (HbS, HgC, etc)do  not significantly interfere with this assay.   However, presence of multiple variants may affect accuracy.     02/18/2020 8.1 (H) 4.0 - 5.6 % Final     Comment:     ADA  Screening Guidelines:  5.7-6.4%  Consistent with prediabetes  >or=6.5%  Consistent with diabetes  High levels of fetal hemoglobin interfere with the HbA1C  assay. Heterozygous hemoglobin variants (HbS, HgC, etc)do  not significantly interfere with this assay.   However, presence of multiple variants may affect accuracy.     10/10/2019 7.7 (H) 4.0 - 5.6 % Final     Comment:     ADA Screening Guidelines:  5.7-6.4%  Consistent with prediabetes  >or=6.5%  Consistent with diabetes  High levels of fetal hemoglobin interfere with the HbA1C  assay. Heterozygous hemoglobin variants (HbS, HgC, etc)do  not significantly interfere with this assay.   However, presence of multiple variants may affect accuracy.                Passed - Cr is 1.3 or below and within 360 days     Creatinine   Date Value Ref Range Status   06/22/2020 0.8 0.5 - 1.4 mg/dL Final   02/18/2020 0.9 0.5 - 1.4 mg/dL Final   05/21/2019 0.9 0.5 - 1.4 mg/dL Final              Passed - eGFR is 30 or above and within 360 days     eGFR if non    Date Value Ref Range Status   06/22/2020 >60.0 >60 mL/min/1.73 m^2 Final     Comment:     Calculation used to obtain the estimated glomerular filtration  rate (eGFR) is the CKD-EPI equation.      02/18/2020 >60.0 >60 mL/min/1.73 m^2 Final     Comment:     Calculation used to obtain the estimated glomerular filtration  rate (eGFR) is the CKD-EPI equation.      05/21/2019 >60.0 >60 mL/min/1.73 m^2 Final     Comment:     Calculation used to obtain the estimated glomerular filtration  rate (eGFR) is the CKD-EPI equation.        eGFR if    Date Value Ref Range Status   06/22/2020 >60.0 >60 mL/min/1.73 m^2 Final   02/18/2020 >60.0 >60 mL/min/1.73 m^2 Final   05/21/2019 >60.0 >60 mL/min/1.73 m^2 Final                  Appointments  past 12m or future 3m with PCP    Date Provider   Last Visit   6/22/2020 Nixon Caldera Jr., MD   Next Visit   Visit date not found Nixon Caldera Jr., MD   ED visits  in past 90 days: 0     Note composed:11:09 AM 08/07/2020

## 2020-09-28 NOTE — TELEPHONE ENCOUNTER
Digital Medicine: Clinician Follow-Up    Called patient to address high BP alert. Patient says he feels fine and denies symptoms of hypertension.     The history is provided by the patient.      Review of patient's allergies indicates:  No Known Allergies  Follow-up reason(s): Alert received.   Care Team received high BP alert.      Hypertension    Patient readings are stable Patient is not experiencing signs/symptoms of hypertension.          Last 5 Patient Entered Readings                                      Current 30 Day Average: 145/70     Recent Readings 9/26/2020 9/22/2020 9/22/2020 9/22/2020 9/22/2020    SBP (mmHg) 186 132 150 163 170    DBP (mmHg) 84 66 60 65 69    Pulse 43 40 40 41 40                 ASSESSMENT(S)  Patients BP average is 145/70 mmHg, which is above goal. Patient's BP goal is less than or equal to 130/80 per 2017 ACC/AHA Hypertension Guidelines.     Hypertension Plan  Medication change. Increase irbesartan to 300 mg once daily. Patient to take two- irbesartan 300 mg tablets until finished his supply of 150 mg tablets       Addressed patient questions and patient has my contact information if needed prior to next outreach. Patient verbalizes understanding.            There are no preventive care reminders to display for this patient.  There are no preventive care reminders to display for this patient.    Hypertension Medications     furosemide (LASIX) 20 MG tablet TAKE 1 TABLET BY MOUTH ONCE DAILY AS NEEDED LEG EDEMA    irbesartan (AVAPRO) 300 MG tablet Take 1 tablet (300 mg total) by mouth every evening.    metoprolol succinate (TOPROL-XL) 25 MG 24 hr tablet TAKE 1 TABLET BY MOUTH DAILY                 Spoke with pt she will split the dose two in am one in evening.

## 2020-10-27 ENCOUNTER — OFFICE VISIT (OUTPATIENT)
Dept: FAMILY MEDICINE | Facility: CLINIC | Age: 85
End: 2020-10-27
Payer: MEDICARE

## 2020-10-27 ENCOUNTER — LAB VISIT (OUTPATIENT)
Dept: LAB | Facility: HOSPITAL | Age: 85
End: 2020-10-27
Attending: EMERGENCY MEDICINE
Payer: MEDICARE

## 2020-10-27 VITALS
OXYGEN SATURATION: 98 % | SYSTOLIC BLOOD PRESSURE: 138 MMHG | WEIGHT: 137.13 LBS | HEART RATE: 53 BPM | BODY MASS INDEX: 25.08 KG/M2 | DIASTOLIC BLOOD PRESSURE: 70 MMHG

## 2020-10-27 DIAGNOSIS — R32 URINARY INCONTINENCE, UNSPECIFIED TYPE: Chronic | ICD-10-CM

## 2020-10-27 DIAGNOSIS — I10 ESSENTIAL HYPERTENSION: Chronic | ICD-10-CM

## 2020-10-27 DIAGNOSIS — I10 DIABETES MELLITUS WITH COINCIDENT HYPERTENSION: Chronic | ICD-10-CM

## 2020-10-27 DIAGNOSIS — G30.1 LATE ONSET ALZHEIMER'S DISEASE WITHOUT BEHAVIORAL DISTURBANCE: Primary | Chronic | ICD-10-CM

## 2020-10-27 DIAGNOSIS — Z85.46 HISTORY OF PROSTATE CANCER: Chronic | ICD-10-CM

## 2020-10-27 DIAGNOSIS — E11.9 DIABETES MELLITUS WITH COINCIDENT HYPERTENSION: Chronic | ICD-10-CM

## 2020-10-27 DIAGNOSIS — F02.80 LATE ONSET ALZHEIMER'S DISEASE WITHOUT BEHAVIORAL DISTURBANCE: Primary | Chronic | ICD-10-CM

## 2020-10-27 LAB
ALBUMIN SERPL BCP-MCNC: 4.4 G/DL (ref 3.5–5.2)
ALP SERPL-CCNC: 64 U/L (ref 55–135)
ALT SERPL W/O P-5'-P-CCNC: 25 U/L (ref 10–44)
ANION GAP SERPL CALC-SCNC: 13 MMOL/L (ref 8–16)
AST SERPL-CCNC: 21 U/L (ref 10–40)
BILIRUB SERPL-MCNC: 0.8 MG/DL (ref 0.1–1)
BUN SERPL-MCNC: 15 MG/DL (ref 8–23)
CALCIUM SERPL-MCNC: 9.4 MG/DL (ref 8.7–10.5)
CHLORIDE SERPL-SCNC: 102 MMOL/L (ref 95–110)
CO2 SERPL-SCNC: 26 MMOL/L (ref 23–29)
COMPLEXED PSA SERPL-MCNC: <0.01 NG/ML (ref 0–4)
CREAT SERPL-MCNC: 0.8 MG/DL (ref 0.5–1.4)
EST. GFR  (AFRICAN AMERICAN): >60 ML/MIN/1.73 M^2
EST. GFR  (NON AFRICAN AMERICAN): >60 ML/MIN/1.73 M^2
ESTIMATED AVG GLUCOSE: 160 MG/DL (ref 68–131)
GLUCOSE SERPL-MCNC: 117 MG/DL (ref 70–110)
HBA1C MFR BLD HPLC: 7.2 % (ref 4–5.6)
POTASSIUM SERPL-SCNC: 3.2 MMOL/L (ref 3.5–5.1)
PROT SERPL-MCNC: 7.7 G/DL (ref 6–8.4)
SODIUM SERPL-SCNC: 141 MMOL/L (ref 136–145)

## 2020-10-27 PROCEDURE — 1126F AMNT PAIN NOTED NONE PRSNT: CPT | Mod: HCNC,S$GLB,, | Performed by: EMERGENCY MEDICINE

## 2020-10-27 PROCEDURE — G0008 FLU VACCINE - QUADRIVALENT - ADJUVANTED: ICD-10-PCS | Mod: HCNC,S$GLB,, | Performed by: EMERGENCY MEDICINE

## 2020-10-27 PROCEDURE — 1126F PR PAIN SEVERITY QUANTIFIED, NO PAIN PRESENT: ICD-10-PCS | Mod: HCNC,S$GLB,, | Performed by: EMERGENCY MEDICINE

## 2020-10-27 PROCEDURE — 1159F MED LIST DOCD IN RCRD: CPT | Mod: HCNC,S$GLB,, | Performed by: EMERGENCY MEDICINE

## 2020-10-27 PROCEDURE — 99213 OFFICE O/P EST LOW 20 MIN: CPT | Mod: HCNC,25,S$GLB, | Performed by: EMERGENCY MEDICINE

## 2020-10-27 PROCEDURE — 3051F PR MOST RECENT HEMOGLOBIN A1C LEVEL 7.0 - < 8.0%: ICD-10-PCS | Mod: HCNC,CPTII,S$GLB, | Performed by: EMERGENCY MEDICINE

## 2020-10-27 PROCEDURE — 90694 FLU VACCINE - QUADRIVALENT - ADJUVANTED: ICD-10-PCS | Mod: HCNC,S$GLB,, | Performed by: EMERGENCY MEDICINE

## 2020-10-27 PROCEDURE — 1101F PR PT FALLS ASSESS DOC 0-1 FALLS W/OUT INJ PAST YR: ICD-10-PCS | Mod: HCNC,CPTII,S$GLB, | Performed by: EMERGENCY MEDICINE

## 2020-10-27 PROCEDURE — 3051F HG A1C>EQUAL 7.0%<8.0%: CPT | Mod: HCNC,CPTII,S$GLB, | Performed by: EMERGENCY MEDICINE

## 2020-10-27 PROCEDURE — 83036 HEMOGLOBIN GLYCOSYLATED A1C: CPT | Mod: HCNC

## 2020-10-27 PROCEDURE — G0008 ADMIN INFLUENZA VIRUS VAC: HCPCS | Mod: HCNC,S$GLB,, | Performed by: EMERGENCY MEDICINE

## 2020-10-27 PROCEDURE — 90694 VACC AIIV4 NO PRSRV 0.5ML IM: CPT | Mod: HCNC,S$GLB,, | Performed by: EMERGENCY MEDICINE

## 2020-10-27 PROCEDURE — 1101F PT FALLS ASSESS-DOCD LE1/YR: CPT | Mod: HCNC,CPTII,S$GLB, | Performed by: EMERGENCY MEDICINE

## 2020-10-27 PROCEDURE — 99213 PR OFFICE/OUTPT VISIT, EST, LEVL III, 20-29 MIN: ICD-10-PCS | Mod: HCNC,25,S$GLB, | Performed by: EMERGENCY MEDICINE

## 2020-10-27 PROCEDURE — 1159F PR MEDICATION LIST DOCUMENTED IN MEDICAL RECORD: ICD-10-PCS | Mod: HCNC,S$GLB,, | Performed by: EMERGENCY MEDICINE

## 2020-10-27 PROCEDURE — 36415 COLL VENOUS BLD VENIPUNCTURE: CPT | Mod: HCNC,PO

## 2020-10-27 PROCEDURE — 99999 PR PBB SHADOW E&M-EST. PATIENT-LVL III: ICD-10-PCS | Mod: PBBFAC,HCNC,, | Performed by: EMERGENCY MEDICINE

## 2020-10-27 PROCEDURE — 84153 ASSAY OF PSA TOTAL: CPT | Mod: HCNC

## 2020-10-27 PROCEDURE — 99999 PR PBB SHADOW E&M-EST. PATIENT-LVL III: CPT | Mod: PBBFAC,HCNC,, | Performed by: EMERGENCY MEDICINE

## 2020-10-27 PROCEDURE — 80053 COMPREHEN METABOLIC PANEL: CPT | Mod: HCNC

## 2020-10-27 NOTE — PROGRESS NOTES
Subjective:   THIS NOTE IS DONE WITH VOICE RECOGNITION        Patient ID: Darnell Knott is a 88 y.o. male.    Chief Complaint: Follow-up      HPI      He is in today for routine follow-up.  He is no longer driving.  His dementia is increasing.  He has essentially no short-term memory.  His wife in family a providing significant support for him.    His diabetes has been stable.  We are not trying have a perfect hemoglobin A1c score.  The risk of hypoglycemia significant.    Lab Results   Component Value Date    HGBA1C 7.9 (H) 06/22/2020    HGBA1C 8.1 (H) 02/18/2020    HGBA1C 7.7 (H) 10/10/2019     Lab Results   Component Value Date    LDLCALC 89.8 02/18/2020    LDLCALC 92.8 01/21/2019    LDLCALC 102.8 04/05/2018     Lab Results   Component Value Date    CREATININE 0.8 06/22/2020    CREATININE 0.9 02/18/2020    CREATININE 0.9 05/21/2019     Lab Results   Component Value Date    ESTGFRAFRICA >60.0 06/22/2020    ESTGFRAFRICA >60.0 02/18/2020    ESTGFRAFRICA >60.0 05/21/2019     Here to follow up on blood pressure.  Taking current medications.  His wife does not report any new cardiovascular symptomatology.    BP Readings from Last 3 Encounters:   10/27/20 138/70   06/22/20 (!) 146/70   02/21/20 138/62     He does have a history of prostate cancer.  He does have incontinence that is not amenable to intervention.    Immunization History   Administered Date(s) Administered    Influenza (FLUAD) - Quadrivalent - Adjuvanted - PF *Preferred* (65+) 10/27/2020    Influenza - High Dose - PF (65 years and older) 12/14/2012, 09/24/2013, 10/03/2014, 10/27/2015, 11/22/2016, 10/19/2017, 10/09/2018, 10/10/2019    Influenza - Trivalent (ADULT) 11/19/2009, 10/26/2010, 09/17/2011    Influenza A (H1N1) 2009 Monovalent - IM 02/02/2010    Influenza Split 11/19/2009, 10/26/2010, 09/17/2011    Pneumococcal Conjugate - 13 Valent 06/24/2004, 06/24/2004, 02/24/2015    Pneumococcal Polysaccharide - 23 Valent 04/27/2016       Current  Outpatient Medications   Medication Sig Dispense Refill    acetaminophen (TYLENOL) 325 MG tablet Take 325 mg by mouth every 6 (six) hours as needed for Pain.      amLODIPine (NORVASC) 10 MG tablet Take 1 tablet (10 mg total) by mouth once daily. 90 tablet 3    aspirin 81 mg Tab 1 Tablet(s) Oral PRN Every day.        atorvastatin (LIPITOR) 40 MG tablet Take 1 tablet (40 mg total) by mouth once daily. 90 tablet 3    blood sugar diagnostic Strp use  test strips and lancets covered by insurance  two times a day. DX: E11.9 200 strip 3    donepeziL (ARICEPT) 5 MG tablet Take 1 tablet (5 mg total) by mouth every evening. 90 tablet 3    glimepiride (AMARYL) 4 MG tablet Take 1.5 tablets (6 mg total) by mouth daily with breakfast. 135 tablet 1    hydrocortisone 2.5 % ointment Apply topically 2 (two) times daily. 20 g 2    INV lisinopril 10 MG Tab Take 1 tablet (10 mg total) by mouth once daily. 90 tablet 3    loperamide (IMODIUM) 2 mg capsule Take 2 mg by mouth 4 (four) times daily as needed for Diarrhea.      omeprazole (PRILOSEC) 20 MG capsule Take 1 capsule (20 mg total) by mouth once daily. (Patient taking differently: Take 20 mg by mouth daily as needed. ) 90 capsule 3    diabetic supplies, miscellan. Kit 1 Device by Misc.(Non-Drug; Combo Route) route once daily. Dx E11.9  Use diabetic test kit covered by insurance. Use two times a day. 1 kit 1     No current facility-administered medications for this visit.          Review of Systems   Unable to perform ROS: Dementia       Objective:      Physical Exam  Vitals signs reviewed.   Constitutional:       General: He is not in acute distress.     Appearance: He is not ill-appearing.   HENT:      Head: Normocephalic.      Mouth/Throat:      Mouth: Mucous membranes are moist.      Pharynx: Oropharynx is clear.   Eyes:      General: No scleral icterus.     Pupils: Pupils are equal, round, and reactive to light.   Cardiovascular:      Rate and Rhythm: Normal rate and  regular rhythm.      Pulses: Normal pulses.           Dorsalis pedis pulses are 2+ on the right side and 2+ on the left side.        Posterior tibial pulses are 2+ on the right side and 2+ on the left side.      Heart sounds: No murmur.   Pulmonary:      Effort: Pulmonary effort is normal. No respiratory distress.      Breath sounds: Normal breath sounds.   Abdominal:      Tenderness: There is no abdominal tenderness.   Musculoskeletal:      Right lower leg: No edema.      Left lower leg: No edema.      Right foot: No deformity or bunion.      Left foot: No deformity or bunion.   Feet:      Right foot:      Skin integrity: Skin integrity normal. No ulcer.      Left foot:      Skin integrity: Skin integrity normal. No ulcer.   Skin:     Capillary Refill: Capillary refill takes less than 2 seconds.   Neurological:      Mental Status: He is alert.   Psychiatric:         Attention and Perception: He is inattentive. He does not perceive auditory or visual hallucinations.         Speech: Speech normal.         Behavior: Behavior normal. Behavior is not aggressive. Behavior is cooperative.         Cognition and Memory: Memory is impaired. He exhibits impaired recent memory.         Judgment: Judgment is inappropriate.      Comments: Slow progression of dementia.         Assessment:       1. Late onset Alzheimer's disease without behavioral disturbance    2. Diabetes mellitus with coincident hypertension    3. Essential hypertension    4. History of prostate cancer    5. Urinary incontinence, unspecified type        Plan:       1. Late onset Alzheimer's disease without behavioral disturbance  Slowly progressing  No behavioral issues.    2. Diabetes mellitus with coincident hypertension  Acceptable control  Discussed at length with his wife.    3. Essential hypertension  Controlled.  Continue current medications  Continue to avoid excessive sodium  Continue home monitoring  Target blood pressure is 139/89 or less    4.  History of prostate cancer  Additional intervention not anticipated     Ref. Range 10/27/2020 11:49   PSA Diagnostic Latest Ref Range: 0.00 - 4.00 ng/mL <0.01       5. Urinary incontinence, unspecified type  Barrier methods  Timed voiding discussed

## 2020-12-17 ENCOUNTER — TELEPHONE (OUTPATIENT)
Dept: FAMILY MEDICINE | Facility: CLINIC | Age: 85
End: 2020-12-17

## 2020-12-17 NOTE — TELEPHONE ENCOUNTER
I spoke with mrs leonardo she said that pt is having increased incontinence. And increased confusion.   Not sure if anything can be given to him to help this.

## 2020-12-17 NOTE — TELEPHONE ENCOUNTER
This pattern is unchanged from his last visit.  She is committed to keeping him at home, which is being enormous pressure on her.    Unless he sick with fever chills or other acute symptoms, routine follow-up is advised.    MALATHI

## 2020-12-17 NOTE — TELEPHONE ENCOUNTER
----- Message from Yvette Starks sent at 12/16/2020 12:32 PM CST -----  Type:  Appointment Request    Caller is requesting an appointment.     Name of Caller: Love Knott (Wife)  When is the first available appointment? NA  Symptoms:  Received reminder letter to schedule  Best Call Back Number:  [phone]  Additional Information:

## 2021-01-05 DIAGNOSIS — E78.2 MIXED HYPERLIPIDEMIA: Primary | Chronic | ICD-10-CM

## 2021-01-05 DIAGNOSIS — E11.9 TYPE 2 DIABETES MELLITUS WITHOUT COMPLICATION, WITHOUT LONG-TERM CURRENT USE OF INSULIN: ICD-10-CM

## 2021-01-05 RX ORDER — GLIMEPIRIDE 4 MG/1
6 TABLET ORAL
Qty: 135 TABLET | Refills: 1 | Status: SHIPPED | OUTPATIENT
Start: 2021-01-05 | End: 2021-03-29 | Stop reason: SDUPTHER

## 2021-02-22 ENCOUNTER — OFFICE VISIT (OUTPATIENT)
Dept: FAMILY MEDICINE | Facility: CLINIC | Age: 86
End: 2021-02-22
Payer: MEDICARE

## 2021-02-22 VITALS
HEART RATE: 61 BPM | DIASTOLIC BLOOD PRESSURE: 62 MMHG | BODY MASS INDEX: 23.9 KG/M2 | WEIGHT: 129.88 LBS | SYSTOLIC BLOOD PRESSURE: 124 MMHG | HEIGHT: 62 IN | OXYGEN SATURATION: 98 %

## 2021-02-22 DIAGNOSIS — E11.9 DIABETES MELLITUS WITH COINCIDENT HYPERTENSION: ICD-10-CM

## 2021-02-22 DIAGNOSIS — I10 ESSENTIAL HYPERTENSION: Chronic | ICD-10-CM

## 2021-02-22 DIAGNOSIS — E78.2 MIXED HYPERLIPIDEMIA: Chronic | ICD-10-CM

## 2021-02-22 DIAGNOSIS — E11.9 TYPE 2 DIABETES MELLITUS WITHOUT COMPLICATION, WITHOUT LONG-TERM CURRENT USE OF INSULIN: ICD-10-CM

## 2021-02-22 DIAGNOSIS — Z00.00 ENCOUNTER FOR PREVENTIVE HEALTH EXAMINATION: Primary | ICD-10-CM

## 2021-02-22 DIAGNOSIS — F03.90 DEMENTIA WITHOUT BEHAVIORAL DISTURBANCE, UNSPECIFIED DEMENTIA TYPE: Chronic | ICD-10-CM

## 2021-02-22 DIAGNOSIS — F02.80 LATE ONSET ALZHEIMER'S DISEASE WITHOUT BEHAVIORAL DISTURBANCE: Chronic | ICD-10-CM

## 2021-02-22 DIAGNOSIS — I10 DIABETES MELLITUS WITH COINCIDENT HYPERTENSION: ICD-10-CM

## 2021-02-22 DIAGNOSIS — G30.1 LATE ONSET ALZHEIMER'S DISEASE WITHOUT BEHAVIORAL DISTURBANCE: Chronic | ICD-10-CM

## 2021-02-22 DIAGNOSIS — E78.5 HYPERLIPIDEMIA, UNSPECIFIED HYPERLIPIDEMIA TYPE: ICD-10-CM

## 2021-02-22 PROCEDURE — 1126F PR PAIN SEVERITY QUANTIFIED, NO PAIN PRESENT: ICD-10-PCS | Mod: S$GLB,,, | Performed by: NURSE PRACTITIONER

## 2021-02-22 PROCEDURE — 3288F PR FALLS RISK ASSESSMENT DOCUMENTED: ICD-10-PCS | Mod: CPTII,S$GLB,, | Performed by: NURSE PRACTITIONER

## 2021-02-22 PROCEDURE — 3288F FALL RISK ASSESSMENT DOCD: CPT | Mod: CPTII,S$GLB,, | Performed by: NURSE PRACTITIONER

## 2021-02-22 PROCEDURE — G0439 PR MEDICARE ANNUAL WELLNESS SUBSEQUENT VISIT: ICD-10-PCS | Mod: S$GLB,,, | Performed by: NURSE PRACTITIONER

## 2021-02-22 PROCEDURE — 99499 UNLISTED E&M SERVICE: CPT | Mod: S$GLB,,, | Performed by: NURSE PRACTITIONER

## 2021-02-22 PROCEDURE — 99999 PR PBB SHADOW E&M-EST. PATIENT-LVL III: CPT | Mod: PBBFAC,,, | Performed by: NURSE PRACTITIONER

## 2021-02-22 PROCEDURE — G0439 PPPS, SUBSEQ VISIT: HCPCS | Mod: S$GLB,,, | Performed by: NURSE PRACTITIONER

## 2021-02-22 PROCEDURE — 1101F PR PT FALLS ASSESS DOC 0-1 FALLS W/OUT INJ PAST YR: ICD-10-PCS | Mod: CPTII,S$GLB,, | Performed by: NURSE PRACTITIONER

## 2021-02-22 PROCEDURE — 3051F PR MOST RECENT HEMOGLOBIN A1C LEVEL 7.0 - < 8.0%: ICD-10-PCS | Mod: CPTII,S$GLB,, | Performed by: NURSE PRACTITIONER

## 2021-02-22 PROCEDURE — 1101F PT FALLS ASSESS-DOCD LE1/YR: CPT | Mod: CPTII,S$GLB,, | Performed by: NURSE PRACTITIONER

## 2021-02-22 PROCEDURE — 99999 PR PBB SHADOW E&M-EST. PATIENT-LVL III: ICD-10-PCS | Mod: PBBFAC,,, | Performed by: NURSE PRACTITIONER

## 2021-02-22 PROCEDURE — 3051F HG A1C>EQUAL 7.0%<8.0%: CPT | Mod: CPTII,S$GLB,, | Performed by: NURSE PRACTITIONER

## 2021-02-22 PROCEDURE — 1126F AMNT PAIN NOTED NONE PRSNT: CPT | Mod: S$GLB,,, | Performed by: NURSE PRACTITIONER

## 2021-02-22 PROCEDURE — 99499 RISK ADDL DX/OHS AUDIT: ICD-10-PCS | Mod: S$GLB,,, | Performed by: NURSE PRACTITIONER

## 2021-02-22 RX ORDER — AMLODIPINE BESYLATE 10 MG/1
10 TABLET ORAL DAILY
Qty: 90 TABLET | Refills: 3 | Status: SHIPPED | OUTPATIENT
Start: 2021-02-22 | End: 2022-03-09 | Stop reason: SDUPTHER

## 2021-02-22 RX ORDER — DONEPEZIL HYDROCHLORIDE 5 MG/1
5 TABLET, FILM COATED ORAL NIGHTLY
Qty: 90 TABLET | Refills: 3 | Status: SHIPPED | OUTPATIENT
Start: 2021-02-22 | End: 2022-03-09 | Stop reason: SDUPTHER

## 2021-02-22 RX ORDER — POLYETHYLENE GLYCOL 3350 17 G/17G
17 POWDER, FOR SOLUTION ORAL DAILY
COMMUNITY

## 2021-02-22 RX ORDER — ATORVASTATIN CALCIUM 40 MG/1
40 TABLET, FILM COATED ORAL DAILY
Qty: 90 TABLET | Refills: 3 | Status: SHIPPED | OUTPATIENT
Start: 2021-02-22 | End: 2022-03-09 | Stop reason: SDUPTHER

## 2021-03-22 ENCOUNTER — TELEPHONE (OUTPATIENT)
Dept: FAMILY MEDICINE | Facility: CLINIC | Age: 86
End: 2021-03-22

## 2021-03-22 DIAGNOSIS — I10 DIABETES MELLITUS WITH COINCIDENT HYPERTENSION: Primary | ICD-10-CM

## 2021-03-22 DIAGNOSIS — E11.9 DIABETES MELLITUS WITH COINCIDENT HYPERTENSION: Primary | ICD-10-CM

## 2021-03-22 DIAGNOSIS — L30.9 DERMATITIS OF FOOT: ICD-10-CM

## 2021-03-25 RX ORDER — HYDROCORTISONE 25 MG/G
OINTMENT TOPICAL 2 TIMES DAILY
Qty: 60 G | Refills: 1 | Status: SHIPPED | OUTPATIENT
Start: 2021-03-25 | End: 2022-05-09 | Stop reason: SDUPTHER

## 2021-03-29 ENCOUNTER — TELEPHONE (OUTPATIENT)
Dept: FAMILY MEDICINE | Facility: CLINIC | Age: 86
End: 2021-03-29

## 2021-03-29 DIAGNOSIS — E11.9 TYPE 2 DIABETES MELLITUS WITHOUT COMPLICATION, WITHOUT LONG-TERM CURRENT USE OF INSULIN: ICD-10-CM

## 2021-03-29 RX ORDER — GLIMEPIRIDE 4 MG/1
6 TABLET ORAL
Qty: 135 TABLET | Refills: 0 | Status: SHIPPED | OUTPATIENT
Start: 2021-03-29 | End: 2021-08-10 | Stop reason: SDUPTHER

## 2021-03-30 ENCOUNTER — TELEPHONE (OUTPATIENT)
Dept: FAMILY MEDICINE | Facility: CLINIC | Age: 86
End: 2021-03-30

## 2021-04-19 ENCOUNTER — LAB VISIT (OUTPATIENT)
Dept: LAB | Facility: HOSPITAL | Age: 86
End: 2021-04-19
Attending: EMERGENCY MEDICINE
Payer: MEDICARE

## 2021-04-19 ENCOUNTER — OFFICE VISIT (OUTPATIENT)
Dept: FAMILY MEDICINE | Facility: CLINIC | Age: 86
End: 2021-04-19
Payer: MEDICARE

## 2021-04-19 DIAGNOSIS — I10 DIABETES MELLITUS WITH COINCIDENT HYPERTENSION: Chronic | ICD-10-CM

## 2021-04-19 DIAGNOSIS — R11.0 NAUSEA: ICD-10-CM

## 2021-04-19 DIAGNOSIS — E78.2 MIXED HYPERLIPIDEMIA: Chronic | ICD-10-CM

## 2021-04-19 DIAGNOSIS — E11.9 DIABETES MELLITUS WITH COINCIDENT HYPERTENSION: Chronic | ICD-10-CM

## 2021-04-19 DIAGNOSIS — I10 ESSENTIAL HYPERTENSION: Chronic | ICD-10-CM

## 2021-04-19 DIAGNOSIS — G30.1 LATE ONSET ALZHEIMER'S DISEASE WITHOUT BEHAVIORAL DISTURBANCE: Primary | Chronic | ICD-10-CM

## 2021-04-19 DIAGNOSIS — F02.80 LATE ONSET ALZHEIMER'S DISEASE WITHOUT BEHAVIORAL DISTURBANCE: Primary | Chronic | ICD-10-CM

## 2021-04-19 PROCEDURE — 99499 UNLISTED E&M SERVICE: CPT | Mod: S$GLB,,, | Performed by: EMERGENCY MEDICINE

## 2021-04-19 PROCEDURE — 3051F HG A1C>EQUAL 7.0%<8.0%: CPT | Mod: CPTII,S$GLB,, | Performed by: EMERGENCY MEDICINE

## 2021-04-19 PROCEDURE — 80053 COMPREHEN METABOLIC PANEL: CPT | Performed by: EMERGENCY MEDICINE

## 2021-04-19 PROCEDURE — 99999 PR PBB SHADOW E&M-EST. PATIENT-LVL IV: ICD-10-PCS | Mod: PBBFAC,,, | Performed by: EMERGENCY MEDICINE

## 2021-04-19 PROCEDURE — 1159F MED LIST DOCD IN RCRD: CPT | Mod: S$GLB,,, | Performed by: EMERGENCY MEDICINE

## 2021-04-19 PROCEDURE — 99499 RISK ADDL DX/OHS AUDIT: ICD-10-PCS | Mod: S$GLB,,, | Performed by: EMERGENCY MEDICINE

## 2021-04-19 PROCEDURE — 1159F PR MEDICATION LIST DOCUMENTED IN MEDICAL RECORD: ICD-10-PCS | Mod: S$GLB,,, | Performed by: EMERGENCY MEDICINE

## 2021-04-19 PROCEDURE — 1126F PR PAIN SEVERITY QUANTIFIED, NO PAIN PRESENT: ICD-10-PCS | Mod: S$GLB,,, | Performed by: EMERGENCY MEDICINE

## 2021-04-19 PROCEDURE — 1126F AMNT PAIN NOTED NONE PRSNT: CPT | Mod: S$GLB,,, | Performed by: EMERGENCY MEDICINE

## 2021-04-19 PROCEDURE — 99214 PR OFFICE/OUTPT VISIT, EST, LEVL IV, 30-39 MIN: ICD-10-PCS | Mod: S$GLB,,, | Performed by: EMERGENCY MEDICINE

## 2021-04-19 PROCEDURE — 80061 LIPID PANEL: CPT | Performed by: EMERGENCY MEDICINE

## 2021-04-19 PROCEDURE — 99214 OFFICE O/P EST MOD 30 MIN: CPT | Mod: S$GLB,,, | Performed by: EMERGENCY MEDICINE

## 2021-04-19 PROCEDURE — 99999 PR PBB SHADOW E&M-EST. PATIENT-LVL IV: CPT | Mod: PBBFAC,,, | Performed by: EMERGENCY MEDICINE

## 2021-04-19 PROCEDURE — 36415 COLL VENOUS BLD VENIPUNCTURE: CPT | Mod: PO | Performed by: EMERGENCY MEDICINE

## 2021-04-19 PROCEDURE — 3051F PR MOST RECENT HEMOGLOBIN A1C LEVEL 7.0 - < 8.0%: ICD-10-PCS | Mod: CPTII,S$GLB,, | Performed by: EMERGENCY MEDICINE

## 2021-04-19 PROCEDURE — 83036 HEMOGLOBIN GLYCOSYLATED A1C: CPT | Performed by: EMERGENCY MEDICINE

## 2021-04-19 RX ORDER — POTASSIUM CHLORIDE 20 MEQ/1
20 TABLET, EXTENDED RELEASE ORAL 2 TIMES DAILY
COMMUNITY
End: 2021-11-02 | Stop reason: SDUPTHER

## 2021-04-19 RX ORDER — ONDANSETRON 4 MG/1
4 TABLET, ORALLY DISINTEGRATING ORAL EVERY 8 HOURS PRN
COMMUNITY
End: 2021-04-19 | Stop reason: SDUPTHER

## 2021-04-19 RX ORDER — ONDANSETRON 4 MG/1
4 TABLET, ORALLY DISINTEGRATING ORAL EVERY 8 HOURS PRN
Qty: 20 TABLET | Refills: 0 | Status: SHIPPED | OUTPATIENT
Start: 2021-04-19

## 2021-04-20 LAB
ALBUMIN SERPL BCP-MCNC: 4.2 G/DL (ref 3.5–5.2)
ALP SERPL-CCNC: 71 U/L (ref 55–135)
ALT SERPL W/O P-5'-P-CCNC: 23 U/L (ref 10–44)
ANION GAP SERPL CALC-SCNC: 14 MMOL/L (ref 8–16)
AST SERPL-CCNC: 21 U/L (ref 10–40)
BILIRUB SERPL-MCNC: 0.5 MG/DL (ref 0.1–1)
BUN SERPL-MCNC: 19 MG/DL (ref 8–23)
CALCIUM SERPL-MCNC: 9.4 MG/DL (ref 8.7–10.5)
CHLORIDE SERPL-SCNC: 103 MMOL/L (ref 95–110)
CHOLEST SERPL-MCNC: 158 MG/DL (ref 120–199)
CHOLEST/HDLC SERPL: 3.9 {RATIO} (ref 2–5)
CO2 SERPL-SCNC: 22 MMOL/L (ref 23–29)
CREAT SERPL-MCNC: 0.8 MG/DL (ref 0.5–1.4)
EST. GFR  (AFRICAN AMERICAN): >60 ML/MIN/1.73 M^2
EST. GFR  (NON AFRICAN AMERICAN): >60 ML/MIN/1.73 M^2
ESTIMATED AVG GLUCOSE: 154 MG/DL (ref 68–131)
GLUCOSE SERPL-MCNC: 108 MG/DL (ref 70–110)
HBA1C MFR BLD: 7 % (ref 4–5.6)
HDLC SERPL-MCNC: 41 MG/DL (ref 40–75)
HDLC SERPL: 25.9 % (ref 20–50)
LDLC SERPL CALC-MCNC: 84.8 MG/DL (ref 63–159)
NONHDLC SERPL-MCNC: 117 MG/DL
POTASSIUM SERPL-SCNC: 3.3 MMOL/L (ref 3.5–5.1)
PROT SERPL-MCNC: 7.9 G/DL (ref 6–8.4)
SODIUM SERPL-SCNC: 139 MMOL/L (ref 136–145)
TRIGL SERPL-MCNC: 161 MG/DL (ref 30–150)

## 2021-04-22 ENCOUNTER — TELEPHONE (OUTPATIENT)
Dept: FAMILY MEDICINE | Facility: CLINIC | Age: 86
End: 2021-04-22

## 2021-04-22 VITALS
WEIGHT: 129.19 LBS | SYSTOLIC BLOOD PRESSURE: 138 MMHG | HEART RATE: 78 BPM | RESPIRATION RATE: 14 BRPM | BODY MASS INDEX: 23.63 KG/M2 | TEMPERATURE: 97 F | DIASTOLIC BLOOD PRESSURE: 64 MMHG | OXYGEN SATURATION: 96 %

## 2021-08-10 ENCOUNTER — OFFICE VISIT (OUTPATIENT)
Dept: FAMILY MEDICINE | Facility: CLINIC | Age: 86
End: 2021-08-10
Payer: MEDICARE

## 2021-08-10 VITALS
DIASTOLIC BLOOD PRESSURE: 68 MMHG | HEART RATE: 65 BPM | WEIGHT: 131.19 LBS | HEIGHT: 62 IN | OXYGEN SATURATION: 97 % | BODY MASS INDEX: 24.14 KG/M2 | SYSTOLIC BLOOD PRESSURE: 162 MMHG

## 2021-08-10 DIAGNOSIS — E11.9 TYPE 2 DIABETES MELLITUS WITHOUT COMPLICATION, WITHOUT LONG-TERM CURRENT USE OF INSULIN: ICD-10-CM

## 2021-08-10 DIAGNOSIS — I10 DIABETES MELLITUS WITH COINCIDENT HYPERTENSION: Chronic | ICD-10-CM

## 2021-08-10 DIAGNOSIS — I10 ESSENTIAL HYPERTENSION: Chronic | ICD-10-CM

## 2021-08-10 DIAGNOSIS — F02.80 LATE ONSET ALZHEIMER'S DISEASE WITHOUT BEHAVIORAL DISTURBANCE: Primary | Chronic | ICD-10-CM

## 2021-08-10 DIAGNOSIS — G30.1 LATE ONSET ALZHEIMER'S DISEASE WITHOUT BEHAVIORAL DISTURBANCE: Primary | Chronic | ICD-10-CM

## 2021-08-10 DIAGNOSIS — E11.9 DIABETES MELLITUS WITH COINCIDENT HYPERTENSION: Chronic | ICD-10-CM

## 2021-08-10 PROCEDURE — 1159F MED LIST DOCD IN RCRD: CPT | Mod: CPTII,S$GLB,, | Performed by: EMERGENCY MEDICINE

## 2021-08-10 PROCEDURE — 1126F AMNT PAIN NOTED NONE PRSNT: CPT | Mod: CPTII,S$GLB,, | Performed by: EMERGENCY MEDICINE

## 2021-08-10 PROCEDURE — 99213 PR OFFICE/OUTPT VISIT, EST, LEVL III, 20-29 MIN: ICD-10-PCS | Mod: S$GLB,,, | Performed by: EMERGENCY MEDICINE

## 2021-08-10 PROCEDURE — 99499 RISK ADDL DX/OHS AUDIT: ICD-10-PCS | Mod: HCNC,S$GLB,, | Performed by: EMERGENCY MEDICINE

## 2021-08-10 PROCEDURE — 1159F PR MEDICATION LIST DOCUMENTED IN MEDICAL RECORD: ICD-10-PCS | Mod: CPTII,S$GLB,, | Performed by: EMERGENCY MEDICINE

## 2021-08-10 PROCEDURE — 1126F PR PAIN SEVERITY QUANTIFIED, NO PAIN PRESENT: ICD-10-PCS | Mod: CPTII,S$GLB,, | Performed by: EMERGENCY MEDICINE

## 2021-08-10 PROCEDURE — 99213 OFFICE O/P EST LOW 20 MIN: CPT | Mod: S$GLB,,, | Performed by: EMERGENCY MEDICINE

## 2021-08-10 PROCEDURE — 3288F PR FALLS RISK ASSESSMENT DOCUMENTED: ICD-10-PCS | Mod: CPTII,S$GLB,, | Performed by: EMERGENCY MEDICINE

## 2021-08-10 PROCEDURE — 99499 UNLISTED E&M SERVICE: CPT | Mod: HCNC,S$GLB,, | Performed by: EMERGENCY MEDICINE

## 2021-08-10 PROCEDURE — 99999 PR PBB SHADOW E&M-EST. PATIENT-LVL IV: ICD-10-PCS | Mod: PBBFAC,,, | Performed by: EMERGENCY MEDICINE

## 2021-08-10 PROCEDURE — 99999 PR PBB SHADOW E&M-EST. PATIENT-LVL IV: CPT | Mod: PBBFAC,,, | Performed by: EMERGENCY MEDICINE

## 2021-08-10 PROCEDURE — 1101F PT FALLS ASSESS-DOCD LE1/YR: CPT | Mod: CPTII,S$GLB,, | Performed by: EMERGENCY MEDICINE

## 2021-08-10 PROCEDURE — 1101F PR PT FALLS ASSESS DOC 0-1 FALLS W/OUT INJ PAST YR: ICD-10-PCS | Mod: CPTII,S$GLB,, | Performed by: EMERGENCY MEDICINE

## 2021-08-10 PROCEDURE — 3288F FALL RISK ASSESSMENT DOCD: CPT | Mod: CPTII,S$GLB,, | Performed by: EMERGENCY MEDICINE

## 2021-08-10 PROCEDURE — 3051F HG A1C>EQUAL 7.0%<8.0%: CPT | Mod: CPTII,S$GLB,, | Performed by: EMERGENCY MEDICINE

## 2021-08-10 PROCEDURE — 3051F PR MOST RECENT HEMOGLOBIN A1C LEVEL 7.0 - < 8.0%: ICD-10-PCS | Mod: CPTII,S$GLB,, | Performed by: EMERGENCY MEDICINE

## 2021-08-10 RX ORDER — GLIMEPIRIDE 4 MG/1
6 TABLET ORAL
Qty: 135 TABLET | Refills: 3 | Status: SHIPPED | OUTPATIENT
Start: 2021-08-10 | End: 2022-05-18 | Stop reason: SDUPTHER

## 2021-10-18 ENCOUNTER — OFFICE VISIT (OUTPATIENT)
Dept: FAMILY MEDICINE | Facility: CLINIC | Age: 86
End: 2021-10-18
Payer: MEDICARE

## 2021-10-18 ENCOUNTER — LAB VISIT (OUTPATIENT)
Dept: LAB | Facility: HOSPITAL | Age: 86
End: 2021-10-18
Attending: FAMILY MEDICINE
Payer: MEDICARE

## 2021-10-18 VITALS
HEIGHT: 62 IN | TEMPERATURE: 98 F | SYSTOLIC BLOOD PRESSURE: 156 MMHG | OXYGEN SATURATION: 98 % | BODY MASS INDEX: 23.53 KG/M2 | DIASTOLIC BLOOD PRESSURE: 60 MMHG | HEART RATE: 50 BPM | WEIGHT: 127.88 LBS

## 2021-10-18 DIAGNOSIS — I10 DIABETES MELLITUS WITH COINCIDENT HYPERTENSION: ICD-10-CM

## 2021-10-18 DIAGNOSIS — E11.9 DIABETES MELLITUS WITH COINCIDENT HYPERTENSION: ICD-10-CM

## 2021-10-18 DIAGNOSIS — E78.2 MIXED HYPERLIPIDEMIA: ICD-10-CM

## 2021-10-18 DIAGNOSIS — R53.1 WEAKNESS: ICD-10-CM

## 2021-10-18 DIAGNOSIS — G30.1 LATE ONSET ALZHEIMER'S DISEASE WITHOUT BEHAVIORAL DISTURBANCE: ICD-10-CM

## 2021-10-18 DIAGNOSIS — R53.1 WEAKNESS: Primary | ICD-10-CM

## 2021-10-18 DIAGNOSIS — F02.80 LATE ONSET ALZHEIMER'S DISEASE WITHOUT BEHAVIORAL DISTURBANCE: ICD-10-CM

## 2021-10-18 DIAGNOSIS — I10 ESSENTIAL HYPERTENSION: ICD-10-CM

## 2021-10-18 DIAGNOSIS — R11.0 NAUSEA: ICD-10-CM

## 2021-10-18 PROCEDURE — 99999 PR PBB SHADOW E&M-EST. PATIENT-LVL III: ICD-10-PCS | Mod: PBBFAC,HCNC,, | Performed by: FAMILY MEDICINE

## 2021-10-18 PROCEDURE — 1126F AMNT PAIN NOTED NONE PRSNT: CPT | Mod: HCNC,CPTII,S$GLB, | Performed by: FAMILY MEDICINE

## 2021-10-18 PROCEDURE — 3051F HG A1C>EQUAL 7.0%<8.0%: CPT | Mod: HCNC,CPTII,S$GLB, | Performed by: FAMILY MEDICINE

## 2021-10-18 PROCEDURE — 1159F MED LIST DOCD IN RCRD: CPT | Mod: HCNC,CPTII,S$GLB, | Performed by: FAMILY MEDICINE

## 2021-10-18 PROCEDURE — 99214 OFFICE O/P EST MOD 30 MIN: CPT | Mod: HCNC,S$GLB,, | Performed by: FAMILY MEDICINE

## 2021-10-18 PROCEDURE — 3288F PR FALLS RISK ASSESSMENT DOCUMENTED: ICD-10-PCS | Mod: HCNC,CPTII,S$GLB, | Performed by: FAMILY MEDICINE

## 2021-10-18 PROCEDURE — 3051F PR MOST RECENT HEMOGLOBIN A1C LEVEL 7.0 - < 8.0%: ICD-10-PCS | Mod: HCNC,CPTII,S$GLB, | Performed by: FAMILY MEDICINE

## 2021-10-18 PROCEDURE — 1160F PR REVIEW ALL MEDS BY PRESCRIBER/CLIN PHARMACIST DOCUMENTED: ICD-10-PCS | Mod: HCNC,CPTII,S$GLB, | Performed by: FAMILY MEDICINE

## 2021-10-18 PROCEDURE — 1101F PR PT FALLS ASSESS DOC 0-1 FALLS W/OUT INJ PAST YR: ICD-10-PCS | Mod: HCNC,CPTII,S$GLB, | Performed by: FAMILY MEDICINE

## 2021-10-18 PROCEDURE — 1126F PR PAIN SEVERITY QUANTIFIED, NO PAIN PRESENT: ICD-10-PCS | Mod: HCNC,CPTII,S$GLB, | Performed by: FAMILY MEDICINE

## 2021-10-18 PROCEDURE — 1159F PR MEDICATION LIST DOCUMENTED IN MEDICAL RECORD: ICD-10-PCS | Mod: HCNC,CPTII,S$GLB, | Performed by: FAMILY MEDICINE

## 2021-10-18 PROCEDURE — 99999 PR PBB SHADOW E&M-EST. PATIENT-LVL III: CPT | Mod: PBBFAC,HCNC,, | Performed by: FAMILY MEDICINE

## 2021-10-18 PROCEDURE — 3288F FALL RISK ASSESSMENT DOCD: CPT | Mod: HCNC,CPTII,S$GLB, | Performed by: FAMILY MEDICINE

## 2021-10-18 PROCEDURE — 1160F RVW MEDS BY RX/DR IN RCRD: CPT | Mod: HCNC,CPTII,S$GLB, | Performed by: FAMILY MEDICINE

## 2021-10-18 PROCEDURE — 1101F PT FALLS ASSESS-DOCD LE1/YR: CPT | Mod: HCNC,CPTII,S$GLB, | Performed by: FAMILY MEDICINE

## 2021-10-18 PROCEDURE — 99214 PR OFFICE/OUTPT VISIT, EST, LEVL IV, 30-39 MIN: ICD-10-PCS | Mod: HCNC,S$GLB,, | Performed by: FAMILY MEDICINE

## 2021-11-01 ENCOUNTER — TELEPHONE (OUTPATIENT)
Dept: FAMILY MEDICINE | Facility: CLINIC | Age: 86
End: 2021-11-01
Payer: MEDICARE

## 2021-11-02 ENCOUNTER — OFFICE VISIT (OUTPATIENT)
Dept: FAMILY MEDICINE | Facility: CLINIC | Age: 86
End: 2021-11-02
Payer: MEDICARE

## 2021-11-02 VITALS
WEIGHT: 129.63 LBS | OXYGEN SATURATION: 97 % | HEART RATE: 57 BPM | HEIGHT: 62 IN | DIASTOLIC BLOOD PRESSURE: 84 MMHG | BODY MASS INDEX: 23.85 KG/M2 | SYSTOLIC BLOOD PRESSURE: 170 MMHG

## 2021-11-02 DIAGNOSIS — F02.80 LATE ONSET ALZHEIMER'S DEMENTIA WITHOUT BEHAVIORAL DISTURBANCE: Chronic | ICD-10-CM

## 2021-11-02 DIAGNOSIS — I10 DIABETES MELLITUS WITH COINCIDENT HYPERTENSION: Chronic | ICD-10-CM

## 2021-11-02 DIAGNOSIS — G30.1 LATE ONSET ALZHEIMER'S DEMENTIA WITHOUT BEHAVIORAL DISTURBANCE: Chronic | ICD-10-CM

## 2021-11-02 DIAGNOSIS — I10 ESSENTIAL HYPERTENSION: Chronic | ICD-10-CM

## 2021-11-02 DIAGNOSIS — E87.6 LOW BLOOD POTASSIUM: Primary | ICD-10-CM

## 2021-11-02 DIAGNOSIS — E11.9 DIABETES MELLITUS WITH COINCIDENT HYPERTENSION: Chronic | ICD-10-CM

## 2021-11-02 PROCEDURE — 3288F FALL RISK ASSESSMENT DOCD: CPT | Mod: HCNC,CPTII,S$GLB, | Performed by: EMERGENCY MEDICINE

## 2021-11-02 PROCEDURE — 1100F PTFALLS ASSESS-DOCD GE2>/YR: CPT | Mod: HCNC,CPTII,S$GLB, | Performed by: EMERGENCY MEDICINE

## 2021-11-02 PROCEDURE — 99214 PR OFFICE/OUTPT VISIT, EST, LEVL IV, 30-39 MIN: ICD-10-PCS | Mod: 25,HCNC,S$GLB, | Performed by: EMERGENCY MEDICINE

## 2021-11-02 PROCEDURE — G0008 ADMIN INFLUENZA VIRUS VAC: HCPCS | Mod: HCNC,S$GLB,, | Performed by: EMERGENCY MEDICINE

## 2021-11-02 PROCEDURE — G0008 FLU VACCINE - QUADRIVALENT - ADJUVANTED: ICD-10-PCS | Mod: HCNC,S$GLB,, | Performed by: EMERGENCY MEDICINE

## 2021-11-02 PROCEDURE — 1126F AMNT PAIN NOTED NONE PRSNT: CPT | Mod: HCNC,CPTII,S$GLB, | Performed by: EMERGENCY MEDICINE

## 2021-11-02 PROCEDURE — 90694 VACC AIIV4 NO PRSRV 0.5ML IM: CPT | Mod: HCNC,S$GLB,, | Performed by: EMERGENCY MEDICINE

## 2021-11-02 PROCEDURE — 1100F PR PT FALLS ASSESS DOC 2+ FALLS/FALL W/INJURY/YR: ICD-10-PCS | Mod: HCNC,CPTII,S$GLB, | Performed by: EMERGENCY MEDICINE

## 2021-11-02 PROCEDURE — 99214 OFFICE O/P EST MOD 30 MIN: CPT | Mod: 25,HCNC,S$GLB, | Performed by: EMERGENCY MEDICINE

## 2021-11-02 PROCEDURE — 1159F MED LIST DOCD IN RCRD: CPT | Mod: HCNC,CPTII,S$GLB, | Performed by: EMERGENCY MEDICINE

## 2021-11-02 PROCEDURE — 1159F PR MEDICATION LIST DOCUMENTED IN MEDICAL RECORD: ICD-10-PCS | Mod: HCNC,CPTII,S$GLB, | Performed by: EMERGENCY MEDICINE

## 2021-11-02 PROCEDURE — 99999 PR PBB SHADOW E&M-EST. PATIENT-LVL III: ICD-10-PCS | Mod: PBBFAC,HCNC,, | Performed by: EMERGENCY MEDICINE

## 2021-11-02 PROCEDURE — 3288F PR FALLS RISK ASSESSMENT DOCUMENTED: ICD-10-PCS | Mod: HCNC,CPTII,S$GLB, | Performed by: EMERGENCY MEDICINE

## 2021-11-02 PROCEDURE — 99999 PR PBB SHADOW E&M-EST. PATIENT-LVL III: CPT | Mod: PBBFAC,HCNC,, | Performed by: EMERGENCY MEDICINE

## 2021-11-02 PROCEDURE — 90694 FLU VACCINE - QUADRIVALENT - ADJUVANTED: ICD-10-PCS | Mod: HCNC,S$GLB,, | Performed by: EMERGENCY MEDICINE

## 2021-11-02 PROCEDURE — 1126F PR PAIN SEVERITY QUANTIFIED, NO PAIN PRESENT: ICD-10-PCS | Mod: HCNC,CPTII,S$GLB, | Performed by: EMERGENCY MEDICINE

## 2021-11-02 RX ORDER — POTASSIUM CHLORIDE 20 MEQ/1
20 TABLET, EXTENDED RELEASE ORAL 2 TIMES DAILY
Qty: 180 TABLET | Refills: 1 | Status: SHIPPED | OUTPATIENT
Start: 2021-11-02 | End: 2022-04-28 | Stop reason: SDUPTHER

## 2021-12-06 ENCOUNTER — LAB VISIT (OUTPATIENT)
Dept: LAB | Facility: HOSPITAL | Age: 86
End: 2021-12-06
Attending: EMERGENCY MEDICINE
Payer: MEDICARE

## 2021-12-06 ENCOUNTER — OFFICE VISIT (OUTPATIENT)
Dept: FAMILY MEDICINE | Facility: CLINIC | Age: 86
End: 2021-12-06
Payer: MEDICARE

## 2021-12-06 VITALS
OXYGEN SATURATION: 92 % | SYSTOLIC BLOOD PRESSURE: 142 MMHG | HEIGHT: 62 IN | HEART RATE: 76 BPM | DIASTOLIC BLOOD PRESSURE: 74 MMHG | WEIGHT: 124.13 LBS | BODY MASS INDEX: 22.84 KG/M2

## 2021-12-06 DIAGNOSIS — M79.10 MYALGIA: ICD-10-CM

## 2021-12-06 DIAGNOSIS — G30.1 LATE ONSET ALZHEIMER'S DEMENTIA WITHOUT BEHAVIORAL DISTURBANCE: Primary | Chronic | ICD-10-CM

## 2021-12-06 DIAGNOSIS — E11.9 TYPE 2 DIABETES MELLITUS WITHOUT COMPLICATION, WITHOUT LONG-TERM CURRENT USE OF INSULIN: ICD-10-CM

## 2021-12-06 DIAGNOSIS — I10 DIABETES MELLITUS WITH COINCIDENT HYPERTENSION: Chronic | ICD-10-CM

## 2021-12-06 DIAGNOSIS — Z85.46 HISTORY OF PROSTATE CANCER: Chronic | ICD-10-CM

## 2021-12-06 DIAGNOSIS — I10 ESSENTIAL HYPERTENSION: Chronic | ICD-10-CM

## 2021-12-06 DIAGNOSIS — R32 URINARY INCONTINENCE, UNSPECIFIED TYPE: Chronic | ICD-10-CM

## 2021-12-06 DIAGNOSIS — E11.9 DIABETES MELLITUS WITH COINCIDENT HYPERTENSION: Chronic | ICD-10-CM

## 2021-12-06 DIAGNOSIS — F02.80 LATE ONSET ALZHEIMER'S DEMENTIA WITHOUT BEHAVIORAL DISTURBANCE: Primary | Chronic | ICD-10-CM

## 2021-12-06 LAB
ALBUMIN SERPL BCP-MCNC: 4.6 G/DL (ref 3.5–5.2)
ALP SERPL-CCNC: 73 U/L (ref 55–135)
ALT SERPL W/O P-5'-P-CCNC: 22 U/L (ref 10–44)
ANION GAP SERPL CALC-SCNC: 11 MMOL/L (ref 8–16)
AST SERPL-CCNC: 24 U/L (ref 10–40)
BILIRUB SERPL-MCNC: 0.4 MG/DL (ref 0.1–1)
BUN SERPL-MCNC: 34 MG/DL (ref 8–23)
CALCIUM SERPL-MCNC: 10.5 MG/DL (ref 8.7–10.5)
CHLORIDE SERPL-SCNC: 105 MMOL/L (ref 95–110)
CO2 SERPL-SCNC: 22 MMOL/L (ref 23–29)
CREAT SERPL-MCNC: 1.1 MG/DL (ref 0.5–1.4)
ERYTHROCYTE [SEDIMENTATION RATE] IN BLOOD BY WESTERGREN METHOD: 5 MM/HR (ref 0–10)
EST. GFR  (AFRICAN AMERICAN): >60 ML/MIN/1.73 M^2
EST. GFR  (NON AFRICAN AMERICAN): 59.2 ML/MIN/1.73 M^2
ESTIMATED AVG GLUCOSE: 157 MG/DL (ref 68–131)
GLUCOSE SERPL-MCNC: 136 MG/DL (ref 70–110)
HBA1C MFR BLD: 7.1 % (ref 4–5.6)
POTASSIUM SERPL-SCNC: 4.5 MMOL/L (ref 3.5–5.1)
PROT SERPL-MCNC: 8.7 G/DL (ref 6–8.4)
SODIUM SERPL-SCNC: 138 MMOL/L (ref 136–145)

## 2021-12-06 PROCEDURE — 99499 RISK ADDL DX/OHS AUDIT: ICD-10-PCS | Mod: S$GLB,,, | Performed by: EMERGENCY MEDICINE

## 2021-12-06 PROCEDURE — 99214 PR OFFICE/OUTPT VISIT, EST, LEVL IV, 30-39 MIN: ICD-10-PCS | Mod: HCNC,S$GLB,, | Performed by: EMERGENCY MEDICINE

## 2021-12-06 PROCEDURE — 36415 COLL VENOUS BLD VENIPUNCTURE: CPT | Mod: HCNC,PO | Performed by: EMERGENCY MEDICINE

## 2021-12-06 PROCEDURE — 99214 OFFICE O/P EST MOD 30 MIN: CPT | Mod: HCNC,S$GLB,, | Performed by: EMERGENCY MEDICINE

## 2021-12-06 PROCEDURE — 80053 COMPREHEN METABOLIC PANEL: CPT | Mod: HCNC | Performed by: EMERGENCY MEDICINE

## 2021-12-06 PROCEDURE — 99999 PR PBB SHADOW E&M-EST. PATIENT-LVL IV: ICD-10-PCS | Mod: PBBFAC,HCNC,, | Performed by: EMERGENCY MEDICINE

## 2021-12-06 PROCEDURE — 83036 HEMOGLOBIN GLYCOSYLATED A1C: CPT | Mod: HCNC | Performed by: EMERGENCY MEDICINE

## 2021-12-06 PROCEDURE — 85025 COMPLETE CBC W/AUTO DIFF WBC: CPT | Mod: HCNC | Performed by: EMERGENCY MEDICINE

## 2021-12-06 PROCEDURE — 99499 UNLISTED E&M SERVICE: CPT | Mod: S$GLB,,, | Performed by: EMERGENCY MEDICINE

## 2021-12-06 PROCEDURE — 85651 RBC SED RATE NONAUTOMATED: CPT | Mod: HCNC,PO | Performed by: EMERGENCY MEDICINE

## 2021-12-06 PROCEDURE — 99999 PR PBB SHADOW E&M-EST. PATIENT-LVL IV: CPT | Mod: PBBFAC,HCNC,, | Performed by: EMERGENCY MEDICINE

## 2021-12-07 LAB
BASOPHILS # BLD AUTO: 0.04 K/UL (ref 0–0.2)
BASOPHILS NFR BLD: 0.4 % (ref 0–1.9)
DIFFERENTIAL METHOD: ABNORMAL
EOSINOPHIL # BLD AUTO: 0.2 K/UL (ref 0–0.5)
EOSINOPHIL NFR BLD: 1.8 % (ref 0–8)
ERYTHROCYTE [DISTWIDTH] IN BLOOD BY AUTOMATED COUNT: 13.6 % (ref 11.5–14.5)
HCT VFR BLD AUTO: 49.9 % (ref 40–54)
HGB BLD-MCNC: 15.8 G/DL (ref 14–18)
IMM GRANULOCYTES # BLD AUTO: 0.04 K/UL (ref 0–0.04)
IMM GRANULOCYTES NFR BLD AUTO: 0.4 % (ref 0–0.5)
LYMPHOCYTES # BLD AUTO: 2 K/UL (ref 1–4.8)
LYMPHOCYTES NFR BLD: 22.1 % (ref 18–48)
MCH RBC QN AUTO: 26.6 PG (ref 27–31)
MCHC RBC AUTO-ENTMCNC: 31.7 G/DL (ref 32–36)
MCV RBC AUTO: 84 FL (ref 82–98)
MONOCYTES # BLD AUTO: 0.7 K/UL (ref 0.3–1)
MONOCYTES NFR BLD: 8 % (ref 4–15)
NEUTROPHILS # BLD AUTO: 6.1 K/UL (ref 1.8–7.7)
NEUTROPHILS NFR BLD: 67.3 % (ref 38–73)
NRBC BLD-RTO: 0 /100 WBC
PLATELET # BLD AUTO: 403 K/UL (ref 150–450)
PMV BLD AUTO: 10.7 FL (ref 9.2–12.9)
RBC # BLD AUTO: 5.93 M/UL (ref 4.6–6.2)
WBC # BLD AUTO: 9.04 K/UL (ref 3.9–12.7)

## 2022-03-09 ENCOUNTER — TELEPHONE (OUTPATIENT)
Dept: FAMILY MEDICINE | Facility: CLINIC | Age: 87
End: 2022-03-09
Payer: MEDICARE

## 2022-03-09 DIAGNOSIS — E11.9 TYPE 2 DIABETES MELLITUS WITHOUT COMPLICATION, WITHOUT LONG-TERM CURRENT USE OF INSULIN: ICD-10-CM

## 2022-03-09 DIAGNOSIS — E78.5 HYPERLIPIDEMIA, UNSPECIFIED HYPERLIPIDEMIA TYPE: ICD-10-CM

## 2022-03-09 DIAGNOSIS — I10 ESSENTIAL HYPERTENSION: Chronic | ICD-10-CM

## 2022-03-09 DIAGNOSIS — F03.90 DEMENTIA WITHOUT BEHAVIORAL DISTURBANCE, UNSPECIFIED DEMENTIA TYPE: Chronic | ICD-10-CM

## 2022-03-09 RX ORDER — AMLODIPINE BESYLATE 10 MG/1
10 TABLET ORAL DAILY
Qty: 90 TABLET | Refills: 3 | Status: SHIPPED | OUTPATIENT
Start: 2022-03-09 | End: 2022-08-01 | Stop reason: SDUPTHER

## 2022-03-09 RX ORDER — DONEPEZIL HYDROCHLORIDE 5 MG/1
5 TABLET, FILM COATED ORAL NIGHTLY
Qty: 90 TABLET | Refills: 3 | Status: SHIPPED | OUTPATIENT
Start: 2022-03-09 | End: 2023-03-09

## 2022-03-09 RX ORDER — ATORVASTATIN CALCIUM 40 MG/1
40 TABLET, FILM COATED ORAL DAILY
Qty: 90 TABLET | Refills: 3 | Status: SHIPPED | OUTPATIENT
Start: 2022-03-09

## 2022-03-09 NOTE — TELEPHONE ENCOUNTER
Care Due:                  Date            Visit Type   Department     Provider  --------------------------------------------------------------------------------                                EP -                              PRIMARY      [facility] FAMILY  Last Visit: 12-      CARE ([facility])   MEDICINE       LONI STUART  Next Visit: None Scheduled  None         None Found                                                            Last  Test          Frequency    Reason                     Performed    Due Date  --------------------------------------------------------------------------------    HBA1C.......  6 months...  glimepiride..............  12- 06-    Powered by [employer]. Reference number: [id number].   3/09/2022 9:44:10 AM CST

## 2022-03-09 NOTE — TELEPHONE ENCOUNTER
----- Message from Brenda Cm sent at 3/9/2022  9:19 AM CST -----  Regarding: Refill  Type: RX Refill Request    Who Called:Love Knott (Spouse)    RX Name and Strength:atorvastatin (LIPITOR) 40 MG tablet,amLODIPine (NORVASC) 10 MG tablet,donepeziL (ARICEPT) 5 MG tablet,INV lisinopril 10 MG Tab    Preferred Pharmacy with phone number:[facility] - [facility] [address]    Would the patient rather a call back or a response via [facility]?    Best Call Back Number:[phone]    Additional Information:Please call pt and spouse today. Please advise

## 2022-03-17 ENCOUNTER — TELEPHONE (OUTPATIENT)
Dept: FAMILY MEDICINE | Facility: CLINIC | Age: 87
End: 2022-03-17
Payer: MEDICARE

## 2022-03-17 DIAGNOSIS — M79.10 MYALGIA: ICD-10-CM

## 2022-03-17 DIAGNOSIS — R53.1 WEAKNESS: Primary | ICD-10-CM

## 2022-03-17 NOTE — TELEPHONE ENCOUNTER
----- Message from Mónica Santoro, Patient Care Assistant sent at 3/17/2022 10:31 AM CDT -----  Contact: Pt Berlin Boswell  Type: Needs Medical Advice    Who Called: Pt Berlin Boswell  Best Call Back Number: [phone] or [phone]  Inquiry/Question: Pt Daughter is calling to see if they can get orders for a hospital bed. Please call back and advise. Thank you~

## 2022-03-17 NOTE — TELEPHONE ENCOUNTER
----- Message from Sol Mejia MA sent at 3/17/2022  4:18 PM CDT -----  Type: Needs Medical Advice  Who Called:  Love  Leo Call Back Number: [phone]  Additional Information: patient's spouse would like to speak with someone about a hospital bed for patient,  she said she is having trouble with him getting up at night

## 2022-03-18 NOTE — TELEPHONE ENCOUNTER
Spoke with Andreia patient's daughter. Notified her orders were ready for his hospital bed. She requested to fax it to Kiana at  [facility]. Faxed via [employer]

## 2022-03-18 NOTE — TELEPHONE ENCOUNTER
----- Message from Neris Estrada LPN sent at 3/17/2022  5:40 PM CDT -----    ----- Message -----  From: Sol Mejia MA  Sent: 3/17/2022   4:21 PM CDT  To: Barrie Zuniga Staff, Werner RAMOS Jr. Staff    Type: Needs Medical Advice  Who Called:  Love Bailey Call Back Number: [phone]  Additional Information: patient's spouse would like to speak with someone about a hospital bed for patient,  she said she is having trouble with him getting up at night

## 2022-03-23 ENCOUNTER — TELEPHONE (OUTPATIENT)
Dept: FAMILY MEDICINE | Facility: CLINIC | Age: 87
End: 2022-03-23
Payer: MEDICARE

## 2022-03-23 NOTE — TELEPHONE ENCOUNTER
----- Message from Mónica Santoro, Patient Care Assistant sent at 3/23/2022  9:41 AM CDT -----  Contact: Andreia  Type: Needs Medical Advice    Who Called: Andreia Bailey Call Back Number: [phone]    Inquiry/Question: Pt is calling to get the status of clinical papers to be signed for a hospital bed. Please call back and advise. Thank you~

## 2022-03-23 NOTE — TELEPHONE ENCOUNTER
Spoke with Kiana at  [facility]. They need clinical notes that will support the need for the hospital bed.   --I received the fax with the information that can be covered.     Pt has an appt Monday. Spoke to Andreia and we will address this during the appt.

## 2022-03-24 ENCOUNTER — TELEPHONE (OUTPATIENT)
Dept: FAMILY MEDICINE | Facility: CLINIC | Age: 87
End: 2022-03-24
Payer: MEDICARE

## 2022-03-24 NOTE — TELEPHONE ENCOUNTER
[facility] medical equpment representative confirmed they need last progress note faxed to support order for hospital bed.  Fax # confirmed.

## 2022-03-28 ENCOUNTER — OFFICE VISIT (OUTPATIENT)
Dept: FAMILY MEDICINE | Facility: CLINIC | Age: 87
End: 2022-03-28
Payer: MEDICARE

## 2022-03-28 VITALS
SYSTOLIC BLOOD PRESSURE: 130 MMHG | BODY MASS INDEX: 20.82 KG/M2 | DIASTOLIC BLOOD PRESSURE: 78 MMHG | HEIGHT: 62 IN | OXYGEN SATURATION: 98 % | HEART RATE: 94 BPM | WEIGHT: 113.13 LBS

## 2022-03-28 DIAGNOSIS — Z85.46 HISTORY OF PROSTATE CANCER: Chronic | ICD-10-CM

## 2022-03-28 DIAGNOSIS — E11.9 DIABETES MELLITUS WITH COINCIDENT HYPERTENSION: Chronic | ICD-10-CM

## 2022-03-28 DIAGNOSIS — G30.1 LATE ONSET ALZHEIMER'S DISEASE WITHOUT BEHAVIORAL DISTURBANCE: Primary | ICD-10-CM

## 2022-03-28 DIAGNOSIS — I10 DIABETES MELLITUS WITH COINCIDENT HYPERTENSION: Chronic | ICD-10-CM

## 2022-03-28 DIAGNOSIS — I10 ESSENTIAL HYPERTENSION: Chronic | ICD-10-CM

## 2022-03-28 DIAGNOSIS — F02.80 LATE ONSET ALZHEIMER'S DISEASE WITHOUT BEHAVIORAL DISTURBANCE: Primary | ICD-10-CM

## 2022-03-28 DIAGNOSIS — R32 URINARY INCONTINENCE, UNSPECIFIED TYPE: Chronic | ICD-10-CM

## 2022-03-28 PROCEDURE — 1159F PR MEDICATION LIST DOCUMENTED IN MEDICAL RECORD: ICD-10-PCS | Mod: CPTII,S$GLB,, | Performed by: EMERGENCY MEDICINE

## 2022-03-28 PROCEDURE — 99214 OFFICE O/P EST MOD 30 MIN: CPT | Mod: S$GLB,,, | Performed by: EMERGENCY MEDICINE

## 2022-03-28 PROCEDURE — 3288F FALL RISK ASSESSMENT DOCD: CPT | Mod: CPTII,S$GLB,, | Performed by: EMERGENCY MEDICINE

## 2022-03-28 PROCEDURE — 3288F PR FALLS RISK ASSESSMENT DOCUMENTED: ICD-10-PCS | Mod: CPTII,S$GLB,, | Performed by: EMERGENCY MEDICINE

## 2022-03-28 PROCEDURE — 99214 PR OFFICE/OUTPT VISIT, EST, LEVL IV, 30-39 MIN: ICD-10-PCS | Mod: S$GLB,,, | Performed by: EMERGENCY MEDICINE

## 2022-03-28 PROCEDURE — 1125F PR PAIN SEVERITY QUANTIFIED, PAIN PRESENT: ICD-10-PCS | Mod: CPTII,S$GLB,, | Performed by: EMERGENCY MEDICINE

## 2022-03-28 PROCEDURE — 1125F AMNT PAIN NOTED PAIN PRSNT: CPT | Mod: CPTII,S$GLB,, | Performed by: EMERGENCY MEDICINE

## 2022-03-28 PROCEDURE — 1100F PTFALLS ASSESS-DOCD GE2>/YR: CPT | Mod: CPTII,S$GLB,, | Performed by: EMERGENCY MEDICINE

## 2022-03-28 PROCEDURE — 99999 PR PBB SHADOW E&M-EST. PATIENT-LVL IV: ICD-10-PCS | Mod: PBBFAC,,, | Performed by: EMERGENCY MEDICINE

## 2022-03-28 PROCEDURE — 1100F PR PT FALLS ASSESS DOC 2+ FALLS/FALL W/INJURY/YR: ICD-10-PCS | Mod: CPTII,S$GLB,, | Performed by: EMERGENCY MEDICINE

## 2022-03-28 PROCEDURE — 99999 PR PBB SHADOW E&M-EST. PATIENT-LVL IV: CPT | Mod: PBBFAC,,, | Performed by: EMERGENCY MEDICINE

## 2022-03-28 PROCEDURE — 3051F PR MOST RECENT HEMOGLOBIN A1C LEVEL 7.0 - < 8.0%: ICD-10-PCS | Mod: CPTII,S$GLB,, | Performed by: EMERGENCY MEDICINE

## 2022-03-28 PROCEDURE — 1159F MED LIST DOCD IN RCRD: CPT | Mod: CPTII,S$GLB,, | Performed by: EMERGENCY MEDICINE

## 2022-03-28 PROCEDURE — 3051F HG A1C>EQUAL 7.0%<8.0%: CPT | Mod: CPTII,S$GLB,, | Performed by: EMERGENCY MEDICINE

## 2022-03-28 NOTE — Clinical Note
Please check with his daughter or wife to see if they have the home health consultation has beem arranged or if it has been completed  I would also like to follow-up on the durable medical equipment request with [facility] in [place].  We did have a document for me to sign, but I cannot find it now.  It may have been sent back already.  I want to be sure that they have the right diagnosis codes for his durable medical equipment.  Follow up in 3 months.

## 2022-03-28 NOTE — PROGRESS NOTES
Subjective:   THIS NOTE IS DONE WITH VOICE RECOGNITION        Patient ID: Darnell Knott is a 89 y.o. male.    Chief Complaint: Follow-up (3 month follow up /--paperwork )         Assessment:       1. Late onset Alzheimer's disease without behavioral disturbance    2. Diabetes mellitus with coincident hypertension    3. Essential hypertension    4. History of prostate cancer    5. Urinary incontinence, unspecified type        Plan:       1. Late onset Alzheimer's disease without behavioral disturbance  Wife overwhelmed  She is at risk for injury.  Dementia is progressing  Weight loss noted.  Will ask home health to do a safety evaluation/home health appropriateness visit.  The family has already purchased some durable medical equipment.  I will ask home health to coordinate any additional durable medical equipment and to clarify who is the durable medical equipment provider was so that I can send a referral.  This for the bed.  No medications added.  If hallucinations increase or become problematic the family will let me know.    - Ambulatory referral/consult to Home Health; Future    2. Diabetes mellitus with coincident hypertension  He is experiencing weight loss  His blood sugars have been stable  Will continue to monitor, and likely does continue diabetes medications.    - Ambulatory referral/consult to Home Health; Future    3. Essential hypertension  At target  No new findings  Again potential for reduction medications pending documentation of blood pressure and rhythm    4. History of prostate cancer  Stable  No additional intervention anticipated    5. Urinary incontinence, unspecified type  Again will ask home health for any necessary supplies they may need or other interventions.    Time spent with the patient and family was read than 45 minutes.    HPI     He and his family are in today to discuss progression of his neurological disease.  He continues to have decreased strength, and is now having to use a  wheelchair most of the time.  The family, on their own, did purchase a hospital bed.  The bed is necessary secondary to his neurological degeneration secondary to his dementia.  Since having the bed things have been much easier for his wife.    Family has also borrowed a bedside commode.    They are not sure they need additional durable medical equipment, but in my mind they certainly need a home health evaluation for safety as well as any other services that in home care could provide.  We did discuss generating a referral, and this has been completed as.    He has not had any recognized hypotensive episodes.  His blood pressure today is better than it has been for a while.  He is continuing to take his medications.  No complaints of chest pain.  I do not believe that he is a reliable  of his active symptoms.    BP Readings from Last 3 Encounters:   03/28/22 130/78   12/06/21 (!) 142/74   11/02/21 (!) 170/84     No recognized deterioration of his diabetes.  Will plan to repeat a hemoglobin A1c sometime this summer.    Lab Results   Component Value Date    HGBA1C 7.1 (H) 12/06/2021     As noted above, his neurological condition continues to deteriorate.  His family mentions that he has having some visual hallucinations, particularly with looking at a fan that he has in his bed area.  I also described times when he freezes.  This isn't the typical freezing Parkinson's, but more freezing are stiff knee in the joints/muscles when people try to reposition him.    There has been no change in his hand movements over the last several months.    Current Outpatient Medications   Medication Sig Dispense Refill    acetaminophen (TYLENOL) 325 MG tablet Take 325 mg by mouth every 6 (six) hours as needed for Pain.      amLODIPine (NORVASC) 10 MG tablet Take 1 tablet (10 mg total) by mouth once daily. 90 tablet 3    aspirin 81 mg Tab 1 Tablet(s) Oral PRN Every day.        atorvastatin (LIPITOR) 40 MG tablet Take 1  tablet (40 mg total) by mouth once daily. 90 tablet 3    blood sugar diagnostic Strp use  test strips and lancets covered by insurance  two times a day. DX: E11.9 200 strip 3    diabetic supplies, miscellan. Kit 1 Device by Misc.(Non-Drug; Combo Route) route once daily. Dx E11.9  Use diabetic test kit covered by insurance. Use two times a day. 1 kit 1    donepeziL (ARICEPT) 5 MG tablet Take 1 tablet (5 mg total) by mouth every evening. 90 tablet 3    glimepiride (AMARYL) 4 MG tablet Take 1.5 tablets (6 mg total) by mouth daily with breakfast. 135 tablet 3    INV lisinopril 10 MG Tab Take 1 tablet (10 mg total) by mouth once daily. 90 tablet 3    loperamide (IMODIUM) 2 mg capsule Take 2 mg by mouth 4 (four) times daily as needed for Diarrhea.      hydrocortisone 2.5 % ointment Apply topically 2 (two) times daily. (Patient not taking: Reported on 3/28/2022) 60 g 1    omeprazole (PRILOSEC) 20 MG capsule Take 1 capsule (20 mg total) by mouth once daily. (Patient not taking: No sig reported) 90 capsule 3    ondansetron (ZOFRAN-ODT) 4 MG TbDL Take 1 tablet (4 mg total) by mouth every 8 (eight) hours as needed (nausea). (Patient not taking: No sig reported) 20 tablet 0    polyethylene glycol (GLYCOLAX) 17 gram/dose powder Take 17 g by mouth once daily.      potassium chloride SA (K-DUR,KLOR-CON) 20 MEQ tablet Take 1 tablet (20 mEq total) by mouth 2 (two) times daily. (Patient not taking: Reported on 3/28/2022) 180 tablet 1     No current facility-administered medications for this visit.         Review of Systems   Constitutional: Positive for activity change and fatigue.        Review of systems is performed with his wife and family.   Respiratory: Negative for cough.    Gastrointestinal:        Occasional diarrhea.    No blood.   Genitourinary:        Incontinence, unchanged.   Musculoskeletal: Positive for arthralgias, back pain and gait problem.   Skin: Negative for wound.   Neurological: Positive for tremors  and weakness.   Psychiatric/Behavioral: Positive for agitation, behavioral problems, confusion, hallucinations and sleep disturbance. Negative for self-injury.       Objective:      Physical Exam  Vitals reviewed.   Constitutional:       Appearance: Normal appearance. He is ill-appearing.   HENT:      Head: Atraumatic.   Neck:      Comments: Cystic like lesion noted posterior neck, 2 centimeters in diameter.  Soft.  Cystic feeling.  Nontender.  Cardiovascular:      Rate and Rhythm: Normal rate and regular rhythm.      Pulses:           Radial pulses are 1+ on the right side and 1+ on the left side.        Dorsalis pedis pulses are 1+ on the right side and 1+ on the left side.      Heart sounds: Heart sounds are distant. No murmur heard.  Musculoskeletal:      Right lower leg: No edema.      Left lower leg: No edema.   Neurological:      Mental Status: He is confused.      Motor: Weakness and tremor present.      Coordination: Coordination abnormal.      Comments: Increasing signs of dementia.  He does not answer direct questions.  He does not initiate conversation.  He does complain with any body moving his joints.    He does have difficulty standing.    Unusual tremor of sliding his and up and down his thumb noted.  No resting tremor appreciated.  No cogwheeling.    Cognitive function continues to decline.   Psychiatric:         Mood and Affect: Affect is inappropriate.         Speech: He is noncommunicative.         Behavior: Behavior is slowed, withdrawn and combative (with attempt to reposition).         Cognition and Memory: Cognition is impaired. Memory is impaired. He exhibits impaired recent memory and impaired remote memory.         Judgment: Judgment is inappropriate.           Time spent with patient and family greater than 45 minutes.

## 2022-03-30 PROCEDURE — G0180 PR HOME HEALTH MD CERTIFICATION: ICD-10-PCS | Mod: ,,, | Performed by: EMERGENCY MEDICINE

## 2022-03-30 PROCEDURE — G0180 MD CERTIFICATION HHA PATIENT: HCPCS | Mod: ,,, | Performed by: EMERGENCY MEDICINE

## 2022-04-06 ENCOUNTER — TELEPHONE (OUTPATIENT)
Dept: FAMILY MEDICINE | Facility: CLINIC | Age: 87
End: 2022-04-06
Payer: MEDICARE

## 2022-04-06 NOTE — TELEPHONE ENCOUNTER
Spoke to Andreia pt daughter. I informed her HH forms have been faxed. She verbalized understanding.

## 2022-04-06 NOTE — TELEPHONE ENCOUNTER
----- Message from Erika Love sent at 4/6/2022  1:28 PM CDT -----  Type: Patient Call Back    Who called: Daughter     What is the request in detail: Pt daughter is requesting a call back     Can the clinic reply by [facility]?    Would the patient rather a call back or a response via [facility]? Call    Best call back number: [phone]

## 2022-04-18 ENCOUNTER — EXTERNAL HOME HEALTH (OUTPATIENT)
Dept: HOME HEALTH SERVICES | Facility: HOSPITAL | Age: 87
End: 2022-04-18
Payer: MEDICARE

## 2022-04-28 DIAGNOSIS — E87.6 LOW BLOOD POTASSIUM: ICD-10-CM

## 2022-04-28 NOTE — TELEPHONE ENCOUNTER
No new care gaps identified.  Powered by [employer]. Reference number: [id number].   4/28/2022 9:45:54 AM CDT

## 2022-04-28 NOTE — TELEPHONE ENCOUNTER
----- Message from Tasia Garcia sent at 4/27/2022 11:03 AM CDT -----  Type:  RX Refill Request  Who Called: Patient  Refill or New Rx: refill  RX Name and Strength: potassium chloride SA (K-DUR,KLOR-CON) 20 MEQ tablet 180 tablet   How is the patient currently taking it? (ex. 1XDay):    Is this a 30 day or 90 day RX:    Preferred Pharmacy with phone number:  [facility] - [facility] [address] [facility] [address] [address]   Phone:  [phone]  Fax:  [phone]  Local or Mail Order:  Mail Order  Ordering Provider:  Nixon Caldera Jr., MD  Best Call Back Number:  [phone]  Additional Information: Pt requesting refill on Rx    potassium chloride SA (K-DUR,KLOR-CON) 20 MEQ tablet 180 tablet

## 2022-04-29 RX ORDER — POTASSIUM CHLORIDE 20 MEQ/1
20 TABLET, EXTENDED RELEASE ORAL 2 TIMES DAILY
Qty: 180 TABLET | Refills: 1 | Status: SHIPPED | OUTPATIENT
Start: 2022-04-29

## 2022-05-06 ENCOUNTER — OFFICE VISIT (OUTPATIENT)
Dept: FAMILY MEDICINE | Facility: CLINIC | Age: 87
End: 2022-05-06
Payer: MEDICARE

## 2022-05-06 VITALS
HEIGHT: 62 IN | SYSTOLIC BLOOD PRESSURE: 110 MMHG | HEART RATE: 60 BPM | DIASTOLIC BLOOD PRESSURE: 50 MMHG | BODY MASS INDEX: 20.8 KG/M2 | WEIGHT: 113 LBS

## 2022-05-06 DIAGNOSIS — E11.620 TYPE 2 DIABETES MELLITUS WITH DIABETIC DERMATITIS, WITHOUT LONG-TERM CURRENT USE OF INSULIN: ICD-10-CM

## 2022-05-06 DIAGNOSIS — R21 RASH: Primary | ICD-10-CM

## 2022-05-06 PROCEDURE — 3288F FALL RISK ASSESSMENT DOCD: CPT | Mod: CPTII,S$GLB,, | Performed by: PHYSICIAN ASSISTANT

## 2022-05-06 PROCEDURE — 99214 PR OFFICE/OUTPT VISIT, EST, LEVL IV, 30-39 MIN: ICD-10-PCS | Mod: 25,S$GLB,, | Performed by: PHYSICIAN ASSISTANT

## 2022-05-06 PROCEDURE — 1126F AMNT PAIN NOTED NONE PRSNT: CPT | Mod: CPTII,S$GLB,, | Performed by: PHYSICIAN ASSISTANT

## 2022-05-06 PROCEDURE — 1159F PR MEDICATION LIST DOCUMENTED IN MEDICAL RECORD: ICD-10-PCS | Mod: CPTII,S$GLB,, | Performed by: PHYSICIAN ASSISTANT

## 2022-05-06 PROCEDURE — 1101F PT FALLS ASSESS-DOCD LE1/YR: CPT | Mod: CPTII,S$GLB,, | Performed by: PHYSICIAN ASSISTANT

## 2022-05-06 PROCEDURE — 3051F PR MOST RECENT HEMOGLOBIN A1C LEVEL 7.0 - < 8.0%: ICD-10-PCS | Mod: CPTII,S$GLB,, | Performed by: PHYSICIAN ASSISTANT

## 2022-05-06 PROCEDURE — 96372 PR INJECTION,THERAP/PROPH/DIAG2ST, IM OR SUBCUT: ICD-10-PCS | Mod: S$GLB,,, | Performed by: PHYSICIAN ASSISTANT

## 2022-05-06 PROCEDURE — 99499 RISK ADDL DX/OHS AUDIT: ICD-10-PCS | Mod: S$GLB,,, | Performed by: PHYSICIAN ASSISTANT

## 2022-05-06 PROCEDURE — 1159F MED LIST DOCD IN RCRD: CPT | Mod: CPTII,S$GLB,, | Performed by: PHYSICIAN ASSISTANT

## 2022-05-06 PROCEDURE — 96372 THER/PROPH/DIAG INJ SC/IM: CPT | Mod: S$GLB,,, | Performed by: PHYSICIAN ASSISTANT

## 2022-05-06 PROCEDURE — 1101F PR PT FALLS ASSESS DOC 0-1 FALLS W/OUT INJ PAST YR: ICD-10-PCS | Mod: CPTII,S$GLB,, | Performed by: PHYSICIAN ASSISTANT

## 2022-05-06 PROCEDURE — 99499 UNLISTED E&M SERVICE: CPT | Mod: S$GLB,,, | Performed by: PHYSICIAN ASSISTANT

## 2022-05-06 PROCEDURE — 3051F HG A1C>EQUAL 7.0%<8.0%: CPT | Mod: CPTII,S$GLB,, | Performed by: PHYSICIAN ASSISTANT

## 2022-05-06 PROCEDURE — 99214 OFFICE O/P EST MOD 30 MIN: CPT | Mod: 25,S$GLB,, | Performed by: PHYSICIAN ASSISTANT

## 2022-05-06 PROCEDURE — 3288F PR FALLS RISK ASSESSMENT DOCUMENTED: ICD-10-PCS | Mod: CPTII,S$GLB,, | Performed by: PHYSICIAN ASSISTANT

## 2022-05-06 PROCEDURE — 1126F PR PAIN SEVERITY QUANTIFIED, NO PAIN PRESENT: ICD-10-PCS | Mod: CPTII,S$GLB,, | Performed by: PHYSICIAN ASSISTANT

## 2022-05-06 RX ORDER — METHYLPREDNISOLONE ACETATE 40 MG/ML
40 INJECTION, SUSPENSION INTRA-ARTICULAR; INTRALESIONAL; INTRAMUSCULAR; SOFT TISSUE
Status: COMPLETED | OUTPATIENT
Start: 2022-05-06 | End: 2022-05-06

## 2022-05-06 RX ORDER — LEVOCETIRIZINE DIHYDROCHLORIDE 5 MG/1
5 TABLET, FILM COATED ORAL NIGHTLY
Qty: 30 TABLET | Refills: 0 | Status: SHIPPED | OUTPATIENT
Start: 2022-05-06 | End: 2022-06-05

## 2022-05-06 RX ADMIN — METHYLPREDNISOLONE ACETATE 40 MG: 40 INJECTION, SUSPENSION INTRA-ARTICULAR; INTRALESIONAL; INTRAMUSCULAR; SOFT TISSUE at 06:05

## 2022-05-07 PROBLEM — Z79.4 TYPE 2 DIABETES MELLITUS WITH DIABETIC DERMATITIS, WITH LONG-TERM CURRENT USE OF INSULIN: Status: ACTIVE | Noted: 2022-05-07

## 2022-05-07 PROBLEM — E11.620 TYPE 2 DIABETES MELLITUS WITH DIABETIC DERMATITIS, WITH LONG-TERM CURRENT USE OF INSULIN: Status: ACTIVE | Noted: 2022-05-07

## 2022-05-07 NOTE — PROGRESS NOTES
Subjective:       Patient ID: Darnell Knott is a 89 y.o. male.    Chief Complaint: Rash    Rash  This is a new problem. The current episode started in the past 7 days. The problem has been gradually worsening since onset. The affected locations include the neck, chest and torso. The rash is characterized by redness and itchiness. It is unknown if there was an exposure to a precipitant. Pertinent negatives include no fever. Past treatments include topical steroids. The treatment provided no relief.     Past Medical History:   Diagnosis Date    Cancer     prostate    Colon polyps     Diabetes mellitus     HTN (hypertension) 4/30/2012    Hyperlipidemia     Hypertension     Kidney stone     Nephrolithiasis     Prostate cancer     s/p radical prostatectomy and radiotherapy    Trouble in sleeping     Type II or unspecified type diabetes mellitus without mention of complication, not stated as uncontrolled     Urinary incontinence        Review of Systems   Constitutional: Negative for activity change, appetite change and fever.   Integumentary:  Positive for rash.         Objective:      Physical Exam  Constitutional:       General: He is not in acute distress.     Appearance: Normal appearance. He is not ill-appearing, toxic-appearing or diaphoretic.   Cardiovascular:      Rate and Rhythm: Normal rate and regular rhythm.      Pulses: Normal pulses.      Heart sounds: Normal heart sounds. No murmur heard.    No friction rub. No gallop.   Pulmonary:      Effort: Pulmonary effort is normal. No respiratory distress.      Breath sounds: Normal breath sounds. No stridor. No wheezing, rhonchi or rales.   Chest:      Chest wall: No tenderness.   Abdominal:      Palpations: Abdomen is soft.      Tenderness: There is no abdominal tenderness.   Musculoskeletal:      Cervical back: No tenderness.   Skin:     Findings: Erythema and rash present. Rash is macular.          Neurological:      Mental Status: He is alert.          Lab Results   Component Value Date    WBC 9.04 12/06/2021    HGB 15.8 12/06/2021    HCT 49.9 12/06/2021    MCV 84 12/06/2021     12/06/2021     CMP  Sodium   Date Value Ref Range Status   12/06/2021 138 136 - 145 mmol/L Final     Potassium   Date Value Ref Range Status   12/06/2021 4.5 3.5 - 5.1 mmol/L Final     Chloride   Date Value Ref Range Status   12/06/2021 105 95 - 110 mmol/L Final     CO2   Date Value Ref Range Status   12/06/2021 22 (L) 23 - 29 mmol/L Final     Glucose   Date Value Ref Range Status   12/06/2021 136 (H) 70 - 110 mg/dL Final     BUN   Date Value Ref Range Status   12/06/2021 34 (H) 8 - 23 mg/dL Final     Creatinine   Date Value Ref Range Status   12/06/2021 1.1 0.5 - 1.4 mg/dL Final     Calcium   Date Value Ref Range Status   12/06/2021 10.5 8.7 - 10.5 mg/dL Final     Total Protein   Date Value Ref Range Status   12/06/2021 8.7 (H) 6.0 - 8.4 g/dL Final     Albumin   Date Value Ref Range Status   12/06/2021 4.6 3.5 - 5.2 g/dL Final     Total Bilirubin   Date Value Ref Range Status   12/06/2021 0.4 0.1 - 1.0 mg/dL Final     Comment:     For infants and newborns, interpretation of results should be based  on gestational age, weight and in agreement with clinical  observations.    Premature Infant recommended reference ranges:  Up to 24 hours.............<8.0 mg/dL  Up to 48 hours............<12.0 mg/dL  3-5 days..................<15.0 mg/dL  6-29 days.................<15.0 mg/dL       Alkaline Phosphatase   Date Value Ref Range Status   12/06/2021 73 55 - 135 U/L Final     AST   Date Value Ref Range Status   12/06/2021 24 10 - 40 U/L Final     ALT   Date Value Ref Range Status   12/06/2021 22 10 - 44 U/L Final     Anion Gap   Date Value Ref Range Status   12/06/2021 11 8 - 16 mmol/L Final     eGFR if    Date Value Ref Range Status   12/06/2021 >60.0 >60 mL/min/1.73 m^2 Final     eGFR if non    Date Value Ref Range Status   12/06/2021 59.2 (A) >60  mL/min/1.73 m^2 Final     Comment:     Calculation used to obtain the estimated glomerular filtration  rate (eGFR) is the CKD-EPI equation.        Lab Results   Component Value Date    CHOL 156 10/18/2021     Lab Results   Component Value Date    HDL 46 10/18/2021     Lab Results   Component Value Date    LDLCALC 89.0 10/18/2021     Lab Results   Component Value Date    TRIG 105 10/18/2021     Lab Results   Component Value Date    CHOLHDL 29.5 10/18/2021     Lab Results   Component Value Date    HGBA1C 7.1 (H) 12/06/2021     Assessment:       Problem List Items Addressed This Visit     Type 2 diabetes mellitus with diabetic dermatitis, with long-term current use of insulin      Other Visit Diagnoses     Rash    -  Primary          Plan:       Rash    Type 2 diabetes mellitus with diabetic dermatitis, without long-term current use of insulin    Other orders  -     methylPREDNISolone acetate injection 40 mg  -     levocetirizine (XYZAL) 5 MG tablet; Take 1 tablet (5 mg total) by mouth every evening.  Dispense: 30 tablet; Refill: 0

## 2022-05-09 DIAGNOSIS — L30.9 DERMATITIS OF FOOT: ICD-10-CM

## 2022-05-09 NOTE — TELEPHONE ENCOUNTER
----- Message from Amy Brown sent at 5/9/2022 12:13 PM CDT -----  Type:  RX Refill Request    Who Called:  PT  Refill or New Rx:  Refill  RX Name and Strength:  hydrocortisone 2.5 % ointment    Preferred Pharmacy with phone number:  2 - [id number] [address]  Local or Mail Order:  Local  Ordering Provider:  Sin Bailey Call Back Number:  [phone]  Additional Information:  Please call and advise

## 2022-05-09 NOTE — TELEPHONE ENCOUNTER
Patient Requesting Refill  LOV:5/6/22  Last fill date:3/25/21  Allergies reviewed  Medication Pended

## 2022-05-10 RX ORDER — HYDROCORTISONE 25 MG/G
OINTMENT TOPICAL 2 TIMES DAILY
Qty: 60 G | Refills: 1 | Status: SHIPPED | OUTPATIENT
Start: 2022-05-10

## 2022-05-17 ENCOUNTER — TELEPHONE (OUTPATIENT)
Dept: FAMILY MEDICINE | Facility: CLINIC | Age: 87
End: 2022-05-17
Payer: MEDICARE

## 2022-05-17 NOTE — TELEPHONE ENCOUNTER
Spoke to Juan with HH.He would like to move pt d/c visit to this week instead of last week. He is requesting a verbal that its okay to d/c him this week.     Is this okay?   I can call Juan to give a verbal   Would you like to see her post the 27th with ECHO/PET?  ~desean

## 2022-05-17 NOTE — TELEPHONE ENCOUNTER
----- Message from Anabell Sharma sent at 5/17/2022 10:20 AM CDT -----  Contact: [phone]  Type: Needs Medical Advice  Who Called:  [facility]   \  Best Call Back Number: [phone]  Additional Information: [facility] is calling to get a verbal confirmation on an order. Pls call back and advise

## 2022-05-18 DIAGNOSIS — E11.9 TYPE 2 DIABETES MELLITUS WITHOUT COMPLICATION, WITHOUT LONG-TERM CURRENT USE OF INSULIN: ICD-10-CM

## 2022-05-18 NOTE — TELEPHONE ENCOUNTER
----- Message from Amy Brown sent at 5/18/2022  9:56 AM CDT -----  Type:  RX Refill Request    Who Called:  PT  Refill or New Rx:  Refill  RX Name and Strength:  glimepiride (AMARYL) 4 MG tablet  How is the patient currently taking it? (ex. 1XDay):  1  Is this a 30 day or 90 day RX:  90  Preferred Pharmacy with phone number:  [email or web address]  Local or Mail Order:  Mail  Ordering Provider:  Dr. Werner Bailey Call Back Number:  [phone]  Additional Information:  Please call and advise

## 2022-05-18 NOTE — TELEPHONE ENCOUNTER
Care Due:                  Date            Visit Type   Department     Provider  --------------------------------------------------------------------------------                                EP -                              PRIMARY      [facility] FAMILY  Last Visit: 03-      CARE ([facility])   MEDICINE       LONI STUART  Next Visit: None Scheduled  None         None Found                                                            Last  Test          Frequency    Reason                     Performed    Due Date  --------------------------------------------------------------------------------    HBA1C.......  6 months...  glimepiride..............  12- 06-    [facility] Embedded Care Gaps. Reference number: [id number]. 5/18/2022   10:40:13 AM CDT

## 2022-05-19 RX ORDER — GLIMEPIRIDE 4 MG/1
6 TABLET ORAL
Qty: 135 TABLET | Refills: 3 | Status: SHIPPED | OUTPATIENT
Start: 2022-05-19 | End: 2022-08-01 | Stop reason: SDUPTHER

## 2022-07-01 ENCOUNTER — DOCUMENT SCAN (OUTPATIENT)
Dept: HOME HEALTH SERVICES | Facility: HOSPITAL | Age: 87
End: 2022-07-01
Payer: MEDICARE

## 2022-07-13 ENCOUNTER — DOCUMENT SCAN (OUTPATIENT)
Dept: HOME HEALTH SERVICES | Facility: HOSPITAL | Age: 87
End: 2022-07-13
Payer: MEDICARE

## 2022-07-16 ENCOUNTER — OFFICE VISIT (OUTPATIENT)
Dept: FAMILY MEDICINE | Facility: CLINIC | Age: 87
End: 2022-07-16
Payer: MEDICARE

## 2022-07-16 VITALS
TEMPERATURE: 98 F | OXYGEN SATURATION: 96 % | WEIGHT: 113.13 LBS | DIASTOLIC BLOOD PRESSURE: 54 MMHG | SYSTOLIC BLOOD PRESSURE: 110 MMHG | BODY MASS INDEX: 20.69 KG/M2 | HEART RATE: 77 BPM

## 2022-07-16 DIAGNOSIS — R40.1 STUPOR: Primary | ICD-10-CM

## 2022-07-16 DIAGNOSIS — F02.80 LATE ONSET ALZHEIMER'S DISEASE WITHOUT BEHAVIORAL DISTURBANCE: ICD-10-CM

## 2022-07-16 DIAGNOSIS — G30.1 LATE ONSET ALZHEIMER'S DISEASE WITHOUT BEHAVIORAL DISTURBANCE: ICD-10-CM

## 2022-07-16 PROCEDURE — 99214 PR OFFICE/OUTPT VISIT, EST, LEVL IV, 30-39 MIN: ICD-10-PCS | Mod: S$GLB,,, | Performed by: PHYSICIAN ASSISTANT

## 2022-07-16 PROCEDURE — 1159F PR MEDICATION LIST DOCUMENTED IN MEDICAL RECORD: ICD-10-PCS | Mod: CPTII,S$GLB,, | Performed by: PHYSICIAN ASSISTANT

## 2022-07-16 PROCEDURE — 1159F MED LIST DOCD IN RCRD: CPT | Mod: CPTII,S$GLB,, | Performed by: PHYSICIAN ASSISTANT

## 2022-07-16 PROCEDURE — 1160F PR REVIEW ALL MEDS BY PRESCRIBER/CLIN PHARMACIST DOCUMENTED: ICD-10-PCS | Mod: CPTII,S$GLB,, | Performed by: PHYSICIAN ASSISTANT

## 2022-07-16 PROCEDURE — 3288F FALL RISK ASSESSMENT DOCD: CPT | Mod: CPTII,S$GLB,, | Performed by: PHYSICIAN ASSISTANT

## 2022-07-16 PROCEDURE — 1160F RVW MEDS BY RX/DR IN RCRD: CPT | Mod: CPTII,S$GLB,, | Performed by: PHYSICIAN ASSISTANT

## 2022-07-16 PROCEDURE — 99214 OFFICE O/P EST MOD 30 MIN: CPT | Mod: S$GLB,,, | Performed by: PHYSICIAN ASSISTANT

## 2022-07-16 PROCEDURE — 1100F PR PT FALLS ASSESS DOC 2+ FALLS/FALL W/INJURY/YR: ICD-10-PCS | Mod: CPTII,S$GLB,, | Performed by: PHYSICIAN ASSISTANT

## 2022-07-16 PROCEDURE — 3288F PR FALLS RISK ASSESSMENT DOCUMENTED: ICD-10-PCS | Mod: CPTII,S$GLB,, | Performed by: PHYSICIAN ASSISTANT

## 2022-07-16 PROCEDURE — 1100F PTFALLS ASSESS-DOCD GE2>/YR: CPT | Mod: CPTII,S$GLB,, | Performed by: PHYSICIAN ASSISTANT

## 2022-07-16 NOTE — PROGRESS NOTES
Subjective:       Patient ID: Darnell Knott is a 89 y.o. male.    Chief Complaint: Fatigue    Patient is an 89 yo male with late stage Alzheimer's  dementia. He is brought in by his daughter and wife today for worsening fatigue and malaise.     Fatigue  This is a new problem. The current episode started in the past 7 days. The problem occurs constantly. The problem has been rapidly worsening. Associated symptoms include fatigue and weakness. Nothing aggravates the symptoms. He has tried nothing for the symptoms.     Past Medical History:   Diagnosis Date    Cancer     prostate    Colon polyps     Diabetes mellitus     HTN (hypertension) 4/30/2012    Hyperlipidemia     Hypertension     Kidney stone     Nephrolithiasis     Prostate cancer     s/p radical prostatectomy and radiotherapy    Trouble in sleeping     Type II or unspecified type diabetes mellitus without mention of complication, not stated as uncontrolled     Urinary incontinence        Review of Systems   Constitutional: Positive for activity change, appetite change and fatigue.   Neurological: Positive for weakness, disturbances in coordination and coordination difficulties.         Objective:      Physical Exam  Vitals reviewed.   Constitutional:       General: He is not in acute distress.     Appearance: He is not ill-appearing, toxic-appearing or diaphoretic.   Cardiovascular:      Rate and Rhythm: Normal rate and regular rhythm.      Pulses: Normal pulses.      Heart sounds: Normal heart sounds. No murmur heard.    No friction rub. No gallop.   Pulmonary:      Effort: Pulmonary effort is normal. No respiratory distress.      Breath sounds: Normal breath sounds. No stridor. No wheezing, rhonchi or rales.   Chest:      Chest wall: No tenderness.   Abdominal:      General: There is no distension.      Palpations: There is no mass.      Tenderness: There is no abdominal tenderness. There is no right CVA tenderness, left CVA tenderness,  guarding or rebound.      Hernia: No hernia is present.   Neurological:      Mental Status: He is lethargic.         Assessment:       Problem List Items Addressed This Visit     Late onset Alzheimer's disease without behavioral disturbance    Relevant Orders    WHEELCHAIR FOR HOME USE      Other Visit Diagnoses     Stupor    -  Primary          Plan:       Stupor  Recommend for patient to go to the ER for further evaluation  Late onset Alzheimer's disease without behavioral disturbance  -     WHEELCHAIR FOR HOME USE

## 2022-07-21 ENCOUNTER — TELEPHONE (OUTPATIENT)
Dept: FAMILY MEDICINE | Facility: CLINIC | Age: 87
End: 2022-07-21
Payer: MEDICARE

## 2022-07-21 NOTE — TELEPHONE ENCOUNTER
----- Message from Nataliia Marie sent at 7/21/2022 10:54 AM CDT -----      Can the clinic reply in [facility]:N        Please refill the medication(s) listed below. Please call the patient when the prescription(s) is ready for  at this phone number         Medication #1 glimepiride (AMARYL) 4 MG tablet    Medication #2 donepeziL (ARICEPT) 5 MG tablet    Medication #3 Lisinopril 10mg      Preferred Pharmacy: [facility] (NOW [facility]) - [facility] [address]

## 2022-08-01 DIAGNOSIS — E11.9 TYPE 2 DIABETES MELLITUS WITHOUT COMPLICATION, WITHOUT LONG-TERM CURRENT USE OF INSULIN: ICD-10-CM

## 2022-08-01 DIAGNOSIS — I10 ESSENTIAL HYPERTENSION: Chronic | ICD-10-CM

## 2022-08-01 RX ORDER — AMLODIPINE BESYLATE 10 MG/1
10 TABLET ORAL DAILY
Qty: 90 TABLET | Refills: 3 | Status: SHIPPED | OUTPATIENT
Start: 2022-08-01

## 2022-08-01 RX ORDER — GLIMEPIRIDE 4 MG/1
6 TABLET ORAL
Qty: 135 TABLET | Refills: 3 | Status: SHIPPED | OUTPATIENT
Start: 2022-08-01

## 2022-08-01 NOTE — TELEPHONE ENCOUNTER
----- Message from Carmencita Healy sent at 8/1/2022 11:00 AM CDT -----  Type:  RX Refill Request    Who Called:  pt wife Love  Refill or New Rx:  refill  RX Name and Strength:   amLODIPine (NORVASC) 10 MG tablet  How is the patient currently taking it? As directed  Is this a 30 day or 90 day RX:  90  Preferred Pharmacy with phone number:    [facility] (Now [facility]) - [facility] [address]  [address] [facility] [address] [facility] [place] [address]  Phone: [phone] Fax: [phone]    Local or Mail Order:  mail  Ordering Provider:  Werner Bailey Call Back Number:  [phone] (home)     Additional Information:  pt wife Love said she would like for all of his med that need to be filled to go this pharmacy--please advise--thank you

## 2022-08-01 NOTE — TELEPHONE ENCOUNTER
Care Due:                  Date            Visit Type   Department     Provider  --------------------------------------------------------------------------------                                EP -                              PRIMARY      [facility] FAMILY  Last Visit: 03-      CARE ([facility])   MEDICINE       LONI STUART  Next Visit: None Scheduled  None         None Found                                                            Last  Test          Frequency    Reason                     Performed    Due Date  --------------------------------------------------------------------------------    HBA1C.......  6 months...  glimepiride..............  12- 06-    Lipid Panel.  12 months..  atorvastatin.............  10-   10-    [facility] Embedded Care Gaps. Reference number: [id number]. 8/01/2022   11:13:04 AM CDT

## 2022-08-31 ENCOUNTER — TELEPHONE (OUTPATIENT)
Dept: FAMILY MEDICINE | Facility: CLINIC | Age: 87
End: 2022-08-31
Payer: MEDICARE

## 2022-08-31 NOTE — TELEPHONE ENCOUNTER
----- Message from Nadia Logannington sent at 8/31/2022  1:52 PM CDT -----  Contact: wife  Type:  RX Refill Request    Who Called:  nicole, wife  Refill or New Rx:  refill  RX Name and Strength:  INV lisinopril 10 MG Tab  How is the patient currently taking it? (ex. 1XDay):  as directed  Is this a 30 day or 90 day RX:  90  Preferred Pharmacy with phone number:    [facility] (Now [facility]) - [place], [place] - [address]  [address] [address]  Phone: [phone] Fax: [phone]  Local or Mail Order:  mail  Ordering Provider:  Werner Bailey Call Back Number:  [phone] (home)   Additional Information:

## 2022-09-20 ENCOUNTER — LAB VISIT (OUTPATIENT)
Dept: LAB | Facility: HOSPITAL | Age: 87
End: 2022-09-20
Attending: EMERGENCY MEDICINE
Payer: MEDICARE

## 2022-09-20 ENCOUNTER — OFFICE VISIT (OUTPATIENT)
Dept: FAMILY MEDICINE | Facility: CLINIC | Age: 87
End: 2022-09-20
Payer: MEDICARE

## 2022-09-20 VITALS
HEIGHT: 62 IN | HEART RATE: 98 BPM | BODY MASS INDEX: 20.82 KG/M2 | SYSTOLIC BLOOD PRESSURE: 142 MMHG | DIASTOLIC BLOOD PRESSURE: 60 MMHG | WEIGHT: 113.13 LBS | OXYGEN SATURATION: 98 %

## 2022-09-20 DIAGNOSIS — E11.9 DIABETES MELLITUS WITH COINCIDENT HYPERTENSION: Chronic | ICD-10-CM

## 2022-09-20 DIAGNOSIS — Z85.46 HISTORY OF PROSTATE CANCER: Chronic | ICD-10-CM

## 2022-09-20 DIAGNOSIS — R32 URINARY INCONTINENCE, UNSPECIFIED TYPE: Chronic | ICD-10-CM

## 2022-09-20 DIAGNOSIS — F02.80 LATE ONSET ALZHEIMER'S DISEASE WITHOUT BEHAVIORAL DISTURBANCE: ICD-10-CM

## 2022-09-20 DIAGNOSIS — I10 DIABETES MELLITUS WITH COINCIDENT HYPERTENSION: Chronic | ICD-10-CM

## 2022-09-20 DIAGNOSIS — G30.1 LATE ONSET ALZHEIMER'S DISEASE WITHOUT BEHAVIORAL DISTURBANCE: ICD-10-CM

## 2022-09-20 DIAGNOSIS — F02.80 LATE ONSET ALZHEIMER'S DISEASE WITHOUT BEHAVIORAL DISTURBANCE: Primary | ICD-10-CM

## 2022-09-20 DIAGNOSIS — I10 ESSENTIAL HYPERTENSION: Chronic | ICD-10-CM

## 2022-09-20 DIAGNOSIS — G30.1 LATE ONSET ALZHEIMER'S DISEASE WITHOUT BEHAVIORAL DISTURBANCE: Primary | ICD-10-CM

## 2022-09-20 LAB
ALBUMIN SERPL BCP-MCNC: 3.8 G/DL (ref 3.5–5.2)
ALP SERPL-CCNC: 68 U/L (ref 55–135)
ALT SERPL W/O P-5'-P-CCNC: 14 U/L (ref 10–44)
ANION GAP SERPL CALC-SCNC: 10 MMOL/L (ref 8–16)
AST SERPL-CCNC: 14 U/L (ref 10–40)
BASOPHILS # BLD AUTO: 0.06 K/UL (ref 0–0.2)
BASOPHILS NFR BLD: 0.7 % (ref 0–1.9)
BILIRUB SERPL-MCNC: 0.3 MG/DL (ref 0.1–1)
BUN SERPL-MCNC: 18 MG/DL (ref 8–23)
CALCIUM SERPL-MCNC: 9.3 MG/DL (ref 8.7–10.5)
CHLORIDE SERPL-SCNC: 110 MMOL/L (ref 95–110)
CO2 SERPL-SCNC: 23 MMOL/L (ref 23–29)
CREAT SERPL-MCNC: 0.7 MG/DL (ref 0.5–1.4)
DIFFERENTIAL METHOD: ABNORMAL
EOSINOPHIL # BLD AUTO: 0.2 K/UL (ref 0–0.5)
EOSINOPHIL NFR BLD: 2.3 % (ref 0–8)
ERYTHROCYTE [DISTWIDTH] IN BLOOD BY AUTOMATED COUNT: 14 % (ref 11.5–14.5)
EST. GFR  (NO RACE VARIABLE): >60 ML/MIN/1.73 M^2
ESTIMATED AVG GLUCOSE: 126 MG/DL (ref 68–131)
GLUCOSE SERPL-MCNC: 118 MG/DL (ref 70–110)
HBA1C MFR BLD: 6 % (ref 4–5.6)
HCT VFR BLD AUTO: 42.5 % (ref 40–54)
HGB BLD-MCNC: 13.4 G/DL (ref 14–18)
IMM GRANULOCYTES # BLD AUTO: 0.04 K/UL (ref 0–0.04)
IMM GRANULOCYTES NFR BLD AUTO: 0.5 % (ref 0–0.5)
LYMPHOCYTES # BLD AUTO: 1.9 K/UL (ref 1–4.8)
LYMPHOCYTES NFR BLD: 22.9 % (ref 18–48)
MCH RBC QN AUTO: 27.3 PG (ref 27–31)
MCHC RBC AUTO-ENTMCNC: 31.5 G/DL (ref 32–36)
MCV RBC AUTO: 87 FL (ref 82–98)
MONOCYTES # BLD AUTO: 0.6 K/UL (ref 0.3–1)
MONOCYTES NFR BLD: 7.2 % (ref 4–15)
NEUTROPHILS # BLD AUTO: 5.6 K/UL (ref 1.8–7.7)
NEUTROPHILS NFR BLD: 66.4 % (ref 38–73)
NRBC BLD-RTO: 0 /100 WBC
PLATELET # BLD AUTO: 306 K/UL (ref 150–450)
PMV BLD AUTO: 11.4 FL (ref 9.2–12.9)
POTASSIUM SERPL-SCNC: 4.4 MMOL/L (ref 3.5–5.1)
PROT SERPL-MCNC: 6.9 G/DL (ref 6–8.4)
RBC # BLD AUTO: 4.91 M/UL (ref 4.6–6.2)
SODIUM SERPL-SCNC: 143 MMOL/L (ref 136–145)
WBC # BLD AUTO: 8.35 K/UL (ref 3.9–12.7)

## 2022-09-20 PROCEDURE — G0008 ADMIN INFLUENZA VIRUS VAC: HCPCS | Mod: S$GLB,,, | Performed by: EMERGENCY MEDICINE

## 2022-09-20 PROCEDURE — G0008 FLU VACCINE - QUADRIVALENT - ADJUVANTED: ICD-10-PCS | Mod: S$GLB,,, | Performed by: EMERGENCY MEDICINE

## 2022-09-20 PROCEDURE — 85025 COMPLETE CBC W/AUTO DIFF WBC: CPT | Performed by: EMERGENCY MEDICINE

## 2022-09-20 PROCEDURE — 36415 COLL VENOUS BLD VENIPUNCTURE: CPT | Mod: PO | Performed by: EMERGENCY MEDICINE

## 2022-09-20 PROCEDURE — 1125F PR PAIN SEVERITY QUANTIFIED, PAIN PRESENT: ICD-10-PCS | Mod: CPTII,S$GLB,, | Performed by: EMERGENCY MEDICINE

## 2022-09-20 PROCEDURE — 99213 OFFICE O/P EST LOW 20 MIN: CPT | Mod: 25,S$GLB,, | Performed by: EMERGENCY MEDICINE

## 2022-09-20 PROCEDURE — 1159F MED LIST DOCD IN RCRD: CPT | Mod: CPTII,S$GLB,, | Performed by: EMERGENCY MEDICINE

## 2022-09-20 PROCEDURE — 99213 PR OFFICE/OUTPT VISIT, EST, LEVL III, 20-29 MIN: ICD-10-PCS | Mod: 25,S$GLB,, | Performed by: EMERGENCY MEDICINE

## 2022-09-20 PROCEDURE — 99999 PR PBB SHADOW E&M-EST. PATIENT-LVL III: CPT | Mod: PBBFAC,,, | Performed by: EMERGENCY MEDICINE

## 2022-09-20 PROCEDURE — 90694 VACC AIIV4 NO PRSRV 0.5ML IM: CPT | Mod: S$GLB,,, | Performed by: EMERGENCY MEDICINE

## 2022-09-20 PROCEDURE — 1159F PR MEDICATION LIST DOCUMENTED IN MEDICAL RECORD: ICD-10-PCS | Mod: CPTII,S$GLB,, | Performed by: EMERGENCY MEDICINE

## 2022-09-20 PROCEDURE — 80053 COMPREHEN METABOLIC PANEL: CPT | Performed by: EMERGENCY MEDICINE

## 2022-09-20 PROCEDURE — 99999 PR PBB SHADOW E&M-EST. PATIENT-LVL III: ICD-10-PCS | Mod: PBBFAC,,, | Performed by: EMERGENCY MEDICINE

## 2022-09-20 PROCEDURE — 90694 FLU VACCINE - QUADRIVALENT - ADJUVANTED: ICD-10-PCS | Mod: S$GLB,,, | Performed by: EMERGENCY MEDICINE

## 2022-09-20 PROCEDURE — 1125F AMNT PAIN NOTED PAIN PRSNT: CPT | Mod: CPTII,S$GLB,, | Performed by: EMERGENCY MEDICINE

## 2022-09-20 PROCEDURE — 83036 HEMOGLOBIN GLYCOSYLATED A1C: CPT | Performed by: EMERGENCY MEDICINE

## 2022-09-20 NOTE — PROGRESS NOTES
Subjective:   THIS NOTE IS DONE WITH VOICE RECOGNITION        Patient ID: Darnell Knott is a 89 y.o. male.    Chief Complaint: Follow-up (6 month f/u)         Assessment:       1. Late onset Alzheimer's disease without behavioral disturbance    2. Essential hypertension    3. Diabetes mellitus with coincident hypertension    4. History of prostate cancer    5. Urinary incontinence, unspecified type        Plan:       1. Late onset Alzheimer's disease without behavioral disturbance  Continued slow advancement   Support services that were in the home, have now been discontinued.    No unusual behaviors   Doing very little talking  Incontinence is unchanged, he is wearing diapers full-time and leaving them on.      - CBC Auto Differential; Future    2. Essential hypertension  Controlled.  Continue current medications  Continue to avoid excessive sodium  Continue home monitoring  Target blood pressure is 139/89 or less  - Comprehensive Metabolic Panel; Future    3. Diabetes mellitus with coincident hypertension  Status unknown  Update labs   No recognized hypoglycemia     - Hemoglobin A1C; Future  - Comprehensive Metabolic Panel; Future    4. History of prostate cancer   Latest Reference Range & Units 04/18/17 09:37 04/05/18 12:37 10/10/19 15:33 10/27/20 11:49   PSA Diagnostic 0.00 - 4.00 ng/mL <0.01 <0.01 <0.01 <0.01       5. Urinary incontinence, unspecified type  No changes  Wearing a diaper full time.        HPI    Dementia and incontinence are unchanged.  His wife is managing all food preparation, and personal care needs.  She is doing sponge past.  He is using a bedside commode.  He has not been abrasive.  No hallucinations or delusions.  His wife is much more comfortable at this time.      They continue to have significant support from their family, particularly their son who lives with them.      No recognized cardiovascular issues.  No change in medications.    BP Readings from Last 3 Encounters:   09/20/22  (!) 142/60   07/16/22 (!) 110/54   05/06/22 (!) 110/50     He was seen in the emergency room at the local hospital.  He a urinary tract infection that responded 2 simple antibiotics.  No repeats.    Diabetes management has not been changed.  We will update his labs today.  No recognized hypoglycemia.    Lab Results   Component Value Date    HGBA1C 7.1 (H) 12/06/2021    HGBA1C 6.7 (H) 10/18/2021    HGBA1C 7.0 (H) 04/19/2021       Lab Results   Component Value Date    LDLCALC 89.0 10/18/2021    LDLCALC 84.8 04/19/2021    LDLCALC 89.8 02/18/2020       Lab Results   Component Value Date    CREATININE 1.1 12/06/2021    CREATININE 0.8 10/18/2021    CREATININE 0.8 04/19/2021       Lab Results   Component Value Date    EGFRNONAA 59.2 (A) 12/06/2021    EGFRNONAA >60.0 10/18/2021    EGFRNONAA >60.0 04/19/2021     Immunization History   Administered Date(s) Administered    COVID-19, MRNA, LN-S, PF (MODERNA FULL 0.5 ML DOSE) 01/22/2021, 02/26/2021    Influenza (FLUAD) - Quadrivalent - Adjuvanted - PF *Preferred* (65+) 10/27/2020, 11/02/2021, 09/20/2022    Influenza - High Dose - PF (65 years and older) 12/14/2012, 09/24/2013, 10/03/2014, 10/27/2015, 11/22/2016, 10/19/2017, 10/09/2018, 10/10/2019    Influenza - Trivalent (ADULT) 11/19/2009, 10/26/2010, 09/17/2011    Influenza A (H1N1) 2009 Monovalent - IM 02/02/2010    Influenza Split 11/19/2009, 10/26/2010, 09/17/2011    Pneumococcal Conjugate - 13 Valent 06/24/2004, 06/24/2004, 02/24/2015    Pneumococcal Polysaccharide - 23 Valent 04/27/2016     Flu shot given today.      Current Outpatient Medications   Medication Sig Dispense Refill    acetaminophen (TYLENOL) 325 MG tablet Take 325 mg by mouth every 6 (six) hours as needed for Pain.      amLODIPine (NORVASC) 10 MG tablet Take 1 tablet (10 mg total) by mouth once daily. 90 tablet 3    aspirin 81 mg Tab 1 Tablet(s) Oral PRN Every day.        atorvastatin (LIPITOR) 40 MG tablet Take 1 tablet (40 mg total) by mouth once daily.  90 tablet 3    blood sugar diagnostic Strp use  test strips and lancets covered by insurance  two times a day. DX: E11.9 200 strip 3    diabetic supplies, miscellan. Kit 1 Device by Misc.(Non-Drug; Combo Route) route once daily. Dx E11.9  Use diabetic test kit covered by insurance. Use two times a day. 1 kit 1    donepeziL (ARICEPT) 5 MG tablet Take 1 tablet (5 mg total) by mouth every evening. 90 tablet 3    glimepiride (AMARYL) 4 MG tablet Take 1.5 tablets (6 mg total) by mouth daily with breakfast. 135 tablet 3    hydrocortisone 2.5 % ointment Apply topically 2 (two) times daily. 60 g 1    INV lisinopril 10 MG Tab Take 1 tablet (10 mg total) by mouth once daily. 90 tablet 3    levocetirizine (XYZAL) 5 MG tablet Take 1 tablet (5 mg total) by mouth every evening. 30 tablet 0    loperamide (IMODIUM) 2 mg capsule Take 2 mg by mouth 4 (four) times daily as needed for Diarrhea.      omeprazole (PRILOSEC) 20 MG capsule Take 1 capsule (20 mg total) by mouth once daily. 90 capsule 3    ondansetron (ZOFRAN-ODT) 4 MG TbDL Take 1 tablet (4 mg total) by mouth every 8 (eight) hours as needed (nausea). 20 tablet 0    polyethylene glycol (GLYCOLAX) 17 gram/dose powder Take 17 g by mouth once daily.      potassium chloride SA (K-DUR,KLOR-CON) 20 MEQ tablet Take 1 tablet (20 mEq total) by mouth 2 (two) times daily. (Patient taking differently: Take 20 mEq by mouth once.) 180 tablet 1     No current facility-administered medications for this visit.         Review of Systems   Unable to perform ROS: Dementia     Objective:      Physical Exam  Vitals reviewed.   Constitutional:       Comments: He is in a wheelchair.  During the exam he was asleep most of the time.  When awake, he was pleasant.  No elective verbalization.  Did not answer straightforward questions.   HENT:      Head: Normocephalic and atraumatic.   Eyes:      Extraocular Movements: Extraocular movements intact.      Conjunctiva/sclera: Conjunctivae normal.    Cardiovascular:      Rate and Rhythm: Normal rate and regular rhythm.   Pulmonary:      Effort: Pulmonary effort is normal.      Breath sounds: Normal breath sounds.   Abdominal:      General: There is no distension.      Tenderness: There is no abdominal tenderness.   Musculoskeletal:      Cervical back: Neck supple. No tenderness.   Skin:     General: Skin is warm and dry.      Coloration: Skin is not jaundiced or pale.   Neurological:      Mental Status: He is lethargic.      Cranial Nerves: No dysarthria or facial asymmetry.      Comments: Advanced dementia.  Detailed neurological exam not completed.

## 2023-07-17 ENCOUNTER — PATIENT OUTREACH (OUTPATIENT)
Dept: ADMINISTRATIVE | Facility: HOSPITAL | Age: 88
End: 2023-07-17
Payer: MEDICARE

## 2023-07-17 NOTE — PROGRESS NOTES
Population Health Chart Review & Patient Outreach Details:     Reason for Outreach Encounter:     [x]  Non-Compliant Report   []  Payor Report ([employer], PHN, BCBS, MSSP, MCIP, C, etc.)   []  Pre-Visit Chart Review     Updates Requested / Reviewed:     [x]  Care Everywhere    [x]     []  External Sources ([facility], [facility], DIS, etc.)   []  Care Team Updated    Patient Outreach Method:    [x]  Telephone Outreach Completed   [] Successful   [x] Left Voicemail   [] Unable to Contact (wrong number, no voicemail)  []  [employer] Portal Outreach Sent  []  Letter Outreach Mailed  []  Fax Sent for External Records  []  External Records Upload    Health Maintenance Topics Addressed and Outreach Outcomes / Actions Taken:        []      Breast Cancer Screening []  Mammo Scheduled      []  External Records Requested     []  Added Reminder to Complete to Upcoming Primary Care Appt Notes     []  Patient Declined     []  Patient Will Call Back to Schedule     []  Patient Will Schedule with External Provider / Order Routed if Applicable             []       Cervical Cancer Screening []  Pap Scheduled      []  External Records Requested     []  Added Reminder to Complete to Upcoming Primary Care Appt Notes     []  Patient Declined     []  Patient Will Call Back to Schedule     []  Patient Will Schedule with External Provider               []          Colorectal Cancer Screening []  Colonoscopy Case Request or Referral Placed     []  External Records Requested     []  Added Reminder to Complete to Upcoming Primary Care Appt Notes     []  Patient Declined     []  Patient Will Call Back to Schedule     []  Patient Will Schedule with External Provider     []  Fit Kit Mailed (add the SmartPhrase under additional notes)     []  Reminded Patient to Complete Home Test             []      Diabetic Eye Exam []  Eye Camera Scheduled or Optometry Referral Placed     []  External Records Requested     []  Added Reminder to Complete to  Upcoming Primary Care Appt Notes     []  Patient Declined     []  Patient Will Call Back to Schedule     []  Patient Will Schedule with External Provider             []      Blood Pressure Control []  Primary Care Follow Up Visit Scheduled     []  Remote Blood Pressure Reading Captured     []  Added Reminder to Complete to Upcoming Primary Care Appt Notes     []  Patient Declined     []  Patient Will Call Back / Patient Will Send Portal Message with Reading     []  Patient Will Call Back to Schedule Provider Visit             []       HbA1c & Other Labs []  Lab Appt Scheduled for Due Labs     []  Primary Care Follow Up Visit Scheduled      []  Reminded Patient to Complete Home Test     []  Added Reminder to Complete to Upcoming Primary Care Appt Notes     []  Patient Declined     []  Patient Will Call Back to Schedule     []  Patient Will Schedule with External Provider / Order Routed if Applicable           [x]    Schedule Primary Care Appt []  Primary Care Appt Scheduled     []  Patient Declined     []  Patient Will Call Back to Schedule     []  Pt Established with External Provider & Updated Care Team             []      Medication Adherence []  Primary Care Appointment Scheduled     []  Added Reminder to Upcoming Primary Care Appt Notes     []  Patient Reminded to  Prescription     []  Patient Declined, Provider Notified if Needed     []  Sent Provider Message to Review and/or Add Exclusion to Problem List             []      Osteoporosis Screening []  DXA Appointment Scheduled     []  External Records Requested     []  Added Reminder to Complete to Upcoming Primary Care Appt Notes     []  Patient Declined     []  Patient Will Call Back to Schedule     []  Patient Will Schedule with External Provider / Order Routed if Applicable     Additional Care Coordinator Notes:         Further Action Needed If Patient Returns Outreach:

## 2024-12-06 NOTE — TELEPHONE ENCOUNTER
----- Message from RT Dora sent at 9/14/2018  9:51 AM CDT -----  Contact: Love,wife,[phone]  Love,wife,[phone], requesting medication refills for the pt's amlodipine, omeprazole, donepezil, and atorvastatin send prescriptions to [facility] mail order pharmacy, and pleae call to confirm, thanks.  
----- Message from RT Dora sent at 9/14/2018  9:51 AM CDT -----  Contact: Love,wife,[phone]  Love,wife,[phone], requesting medication refills for the pt's amlodipine, omeprazole, donepezil, and atorvastatin send prescriptions to [facility] mail order pharmacy, and pleae call to confirm, thanks.  
LOV 4/5/2018  
Detail Level: Generalized
Sunscreen Recommendations: Recommend ISDIN sunscreen
Detail Level: Detailed